# Patient Record
Sex: MALE | Race: WHITE | NOT HISPANIC OR LATINO | Employment: FULL TIME | URBAN - METROPOLITAN AREA
[De-identification: names, ages, dates, MRNs, and addresses within clinical notes are randomized per-mention and may not be internally consistent; named-entity substitution may affect disease eponyms.]

---

## 2017-07-01 ENCOUNTER — APPOINTMENT (EMERGENCY)
Dept: RADIOLOGY | Facility: HOSPITAL | Age: 47
End: 2017-07-01
Payer: COMMERCIAL

## 2017-07-01 ENCOUNTER — HOSPITAL ENCOUNTER (EMERGENCY)
Facility: HOSPITAL | Age: 47
Discharge: HOME | End: 2017-07-01
Admitting: EMERGENCY MEDICINE
Payer: COMMERCIAL

## 2017-07-01 VITALS
SYSTOLIC BLOOD PRESSURE: 159 MMHG | BODY MASS INDEX: 27.77 KG/M2 | DIASTOLIC BLOOD PRESSURE: 79 MMHG | HEART RATE: 78 BPM | WEIGHT: 205 LBS | HEIGHT: 72 IN | TEMPERATURE: 98.2 F | OXYGEN SATURATION: 99 % | RESPIRATION RATE: 20 BRPM

## 2017-07-01 DIAGNOSIS — M77.12 LATERAL EPICONDYLITIS OF LEFT ELBOW: Primary | ICD-10-CM

## 2017-07-01 PROCEDURE — 99283 EMERGENCY DEPT VISIT LOW MDM: CPT

## 2017-07-01 PROCEDURE — 73080 X-RAY EXAM OF ELBOW: CPT

## 2017-07-01 RX ORDER — TRAMADOL HYDROCHLORIDE 50 MG/1
50 TABLET ORAL ONCE
Status: COMPLETED | OUTPATIENT
Start: 2017-07-01 | End: 2017-07-01

## 2017-07-01 RX ORDER — PREDNISONE 20 MG/1
60 TABLET ORAL ONCE
Status: COMPLETED | OUTPATIENT
Start: 2017-07-01 | End: 2017-07-01

## 2017-07-01 RX ORDER — PREDNISONE 20 MG/1
40 TABLET ORAL DAILY
Qty: 8 TABLET | Refills: 0 | Status: SHIPPED | OUTPATIENT
Start: 2017-07-01 | End: 2017-07-05

## 2017-07-01 RX ORDER — NAPROXEN 500 MG/1
500 TABLET ORAL 2 TIMES DAILY WITH MEALS
Qty: 20 TABLET | Refills: 0 | Status: SHIPPED | OUTPATIENT
Start: 2017-07-01 | End: 2018-02-25

## 2017-07-01 RX ADMIN — PREDNISONE 60 MG: 20 TABLET ORAL at 11:04

## 2017-07-01 RX ADMIN — TRAMADOL HYDROCHLORIDE 50 MG: 50 TABLET, COATED ORAL at 11:04

## 2017-07-22 ENCOUNTER — HOSPITAL ENCOUNTER (EMERGENCY)
Facility: HOSPITAL | Age: 47
Discharge: HOME | End: 2017-07-22
Attending: EMERGENCY MEDICINE | Admitting: EMERGENCY MEDICINE
Payer: COMMERCIAL

## 2017-07-22 VITALS
DIASTOLIC BLOOD PRESSURE: 100 MMHG | RESPIRATION RATE: 20 BRPM | HEART RATE: 78 BPM | TEMPERATURE: 97.1 F | OXYGEN SATURATION: 98 % | SYSTOLIC BLOOD PRESSURE: 178 MMHG | BODY MASS INDEX: 27.8 KG/M2 | WEIGHT: 205 LBS

## 2017-07-22 DIAGNOSIS — M77.10: Primary | ICD-10-CM

## 2017-07-22 PROCEDURE — 99283 EMERGENCY DEPT VISIT LOW MDM: CPT

## 2017-07-22 PROCEDURE — 96372 THER/PROPH/DIAG INJ SC/IM: CPT

## 2017-07-22 RX ORDER — KETOROLAC TROMETHAMINE 30 MG/ML
30 INJECTION, SOLUTION INTRAMUSCULAR; INTRAVENOUS ONCE
Status: COMPLETED | OUTPATIENT
Start: 2017-07-22 | End: 2017-07-22

## 2017-07-22 RX ORDER — PREDNISONE 20 MG/1
20 TABLET ORAL DAILY
Qty: 5 TABLET | Refills: 0 | Status: SHIPPED | OUTPATIENT
Start: 2017-07-22 | End: 2017-07-27

## 2017-07-22 RX ORDER — PREDNISONE 20 MG/1
20 TABLET ORAL ONCE
Status: COMPLETED | OUTPATIENT
Start: 2017-07-22 | End: 2017-07-22

## 2017-07-22 RX ADMIN — PREDNISONE 20 MG: 20 TABLET ORAL at 00:53

## 2017-07-22 RX ADMIN — KETOROLAC TROMETHAMINE 30 MG: 30 INJECTION, SOLUTION INTRAMUSCULAR at 00:53

## 2017-08-02 ENCOUNTER — ALLSCRIPTS OFFICE VISIT (OUTPATIENT)
Dept: OTHER | Facility: OTHER | Age: 47
End: 2017-08-02

## 2017-08-02 DIAGNOSIS — M25.532 PAIN IN LEFT WRIST: ICD-10-CM

## 2017-08-02 DIAGNOSIS — M77.12 LATERAL EPICONDYLITIS OF LEFT ELBOW: ICD-10-CM

## 2017-08-02 DIAGNOSIS — M19.029: ICD-10-CM

## 2017-08-09 ENCOUNTER — APPOINTMENT (OUTPATIENT)
Dept: OCCUPATIONAL THERAPY | Facility: CLINIC | Age: 47
End: 2017-08-09
Payer: COMMERCIAL

## 2017-08-09 PROCEDURE — 97033 APP MDLTY 1+IONTPHRSIS EA 15: CPT

## 2017-08-09 PROCEDURE — 97165 OT EVAL LOW COMPLEX 30 MIN: CPT

## 2017-08-10 ENCOUNTER — GENERIC CONVERSION - ENCOUNTER (OUTPATIENT)
Dept: OTHER | Facility: OTHER | Age: 47
End: 2017-08-10

## 2017-08-10 ENCOUNTER — APPOINTMENT (OUTPATIENT)
Dept: RADIOLOGY | Facility: CLINIC | Age: 47
End: 2017-08-10
Payer: COMMERCIAL

## 2017-08-10 DIAGNOSIS — M25.532 PAIN IN LEFT WRIST: ICD-10-CM

## 2017-08-10 PROCEDURE — 73110 X-RAY EXAM OF WRIST: CPT

## 2017-08-11 ENCOUNTER — APPOINTMENT (OUTPATIENT)
Dept: OCCUPATIONAL THERAPY | Facility: CLINIC | Age: 47
End: 2017-08-11
Payer: COMMERCIAL

## 2017-08-11 PROCEDURE — 97035 APP MDLTY 1+ULTRASOUND EA 15: CPT

## 2017-08-11 PROCEDURE — 97140 MANUAL THERAPY 1/> REGIONS: CPT

## 2017-08-14 ENCOUNTER — APPOINTMENT (OUTPATIENT)
Dept: OCCUPATIONAL THERAPY | Facility: CLINIC | Age: 47
End: 2017-08-14
Payer: COMMERCIAL

## 2017-08-14 PROCEDURE — 97110 THERAPEUTIC EXERCISES: CPT

## 2017-08-14 PROCEDURE — 97140 MANUAL THERAPY 1/> REGIONS: CPT

## 2017-08-17 ENCOUNTER — APPOINTMENT (OUTPATIENT)
Dept: OCCUPATIONAL THERAPY | Facility: CLINIC | Age: 47
End: 2017-08-17
Payer: COMMERCIAL

## 2017-08-17 PROCEDURE — 97140 MANUAL THERAPY 1/> REGIONS: CPT

## 2017-08-22 ENCOUNTER — APPOINTMENT (OUTPATIENT)
Dept: OCCUPATIONAL THERAPY | Facility: CLINIC | Age: 47
End: 2017-08-22
Payer: COMMERCIAL

## 2017-08-22 PROCEDURE — 97140 MANUAL THERAPY 1/> REGIONS: CPT

## 2017-08-24 ENCOUNTER — APPOINTMENT (OUTPATIENT)
Dept: OCCUPATIONAL THERAPY | Facility: CLINIC | Age: 47
End: 2017-08-24
Payer: COMMERCIAL

## 2017-08-24 PROCEDURE — 97140 MANUAL THERAPY 1/> REGIONS: CPT

## 2017-08-29 ENCOUNTER — APPOINTMENT (OUTPATIENT)
Dept: OCCUPATIONAL THERAPY | Facility: CLINIC | Age: 47
End: 2017-08-29
Payer: COMMERCIAL

## 2017-08-29 PROCEDURE — 97140 MANUAL THERAPY 1/> REGIONS: CPT

## 2017-08-29 PROCEDURE — 97110 THERAPEUTIC EXERCISES: CPT

## 2017-08-31 ENCOUNTER — APPOINTMENT (OUTPATIENT)
Dept: OCCUPATIONAL THERAPY | Facility: CLINIC | Age: 47
End: 2017-08-31
Payer: COMMERCIAL

## 2017-08-31 PROCEDURE — 97140 MANUAL THERAPY 1/> REGIONS: CPT

## 2017-09-01 ENCOUNTER — ALLSCRIPTS OFFICE VISIT (OUTPATIENT)
Dept: OTHER | Facility: OTHER | Age: 47
End: 2017-09-01

## 2017-09-05 ENCOUNTER — HOSPITAL ENCOUNTER (OUTPATIENT)
Dept: RADIOLOGY | Facility: HOSPITAL | Age: 47
Discharge: HOME | End: 2017-09-05
Attending: ORTHOPAEDIC SURGERY
Payer: COMMERCIAL

## 2017-09-05 DIAGNOSIS — M19.029: ICD-10-CM

## 2017-09-05 PROCEDURE — 73221 MRI JOINT UPR EXTREM W/O DYE: CPT

## 2017-09-07 ENCOUNTER — APPOINTMENT (OUTPATIENT)
Dept: OCCUPATIONAL THERAPY | Facility: CLINIC | Age: 47
End: 2017-09-07
Payer: COMMERCIAL

## 2017-09-07 PROCEDURE — 97110 THERAPEUTIC EXERCISES: CPT

## 2017-09-07 PROCEDURE — 97140 MANUAL THERAPY 1/> REGIONS: CPT

## 2017-09-08 ENCOUNTER — ALLSCRIPTS OFFICE VISIT (OUTPATIENT)
Dept: OTHER | Facility: OTHER | Age: 47
End: 2017-09-08

## 2017-09-11 ENCOUNTER — APPOINTMENT (OUTPATIENT)
Dept: OCCUPATIONAL THERAPY | Facility: CLINIC | Age: 47
End: 2017-09-11
Payer: COMMERCIAL

## 2017-09-11 ENCOUNTER — GENERIC CONVERSION - ENCOUNTER (OUTPATIENT)
Dept: OTHER | Facility: OTHER | Age: 47
End: 2017-09-11

## 2017-09-11 PROCEDURE — 97110 THERAPEUTIC EXERCISES: CPT

## 2017-09-11 PROCEDURE — 97140 MANUAL THERAPY 1/> REGIONS: CPT

## 2017-09-13 ENCOUNTER — APPOINTMENT (OUTPATIENT)
Dept: OCCUPATIONAL THERAPY | Facility: CLINIC | Age: 47
End: 2017-09-13
Payer: COMMERCIAL

## 2017-09-13 PROCEDURE — 97110 THERAPEUTIC EXERCISES: CPT

## 2017-09-13 PROCEDURE — 97140 MANUAL THERAPY 1/> REGIONS: CPT

## 2017-09-18 ENCOUNTER — APPOINTMENT (OUTPATIENT)
Dept: OCCUPATIONAL THERAPY | Facility: CLINIC | Age: 47
End: 2017-09-18
Payer: COMMERCIAL

## 2017-09-19 ENCOUNTER — APPOINTMENT (OUTPATIENT)
Dept: OCCUPATIONAL THERAPY | Facility: CLINIC | Age: 47
End: 2017-09-19
Payer: COMMERCIAL

## 2017-09-19 PROCEDURE — 97110 THERAPEUTIC EXERCISES: CPT

## 2017-09-19 PROCEDURE — 97140 MANUAL THERAPY 1/> REGIONS: CPT

## 2017-09-20 ENCOUNTER — APPOINTMENT (OUTPATIENT)
Dept: OCCUPATIONAL THERAPY | Facility: CLINIC | Age: 47
End: 2017-09-20
Payer: COMMERCIAL

## 2017-09-20 PROCEDURE — 97140 MANUAL THERAPY 1/> REGIONS: CPT

## 2017-09-20 PROCEDURE — 97110 THERAPEUTIC EXERCISES: CPT

## 2017-09-22 ENCOUNTER — GENERIC CONVERSION - ENCOUNTER (OUTPATIENT)
Dept: OTHER | Facility: OTHER | Age: 47
End: 2017-09-22

## 2017-09-25 ENCOUNTER — APPOINTMENT (OUTPATIENT)
Dept: OCCUPATIONAL THERAPY | Facility: CLINIC | Age: 47
End: 2017-09-25
Payer: COMMERCIAL

## 2017-09-25 PROCEDURE — 97110 THERAPEUTIC EXERCISES: CPT

## 2017-09-25 PROCEDURE — 97140 MANUAL THERAPY 1/> REGIONS: CPT

## 2017-09-27 ENCOUNTER — APPOINTMENT (OUTPATIENT)
Dept: OCCUPATIONAL THERAPY | Facility: CLINIC | Age: 47
End: 2017-09-27
Payer: COMMERCIAL

## 2017-10-02 ENCOUNTER — APPOINTMENT (OUTPATIENT)
Dept: OCCUPATIONAL THERAPY | Facility: CLINIC | Age: 47
End: 2017-10-02
Payer: COMMERCIAL

## 2017-10-02 PROCEDURE — 97140 MANUAL THERAPY 1/> REGIONS: CPT

## 2017-10-02 PROCEDURE — 97110 THERAPEUTIC EXERCISES: CPT

## 2017-10-03 ENCOUNTER — ALLSCRIPTS OFFICE VISIT (OUTPATIENT)
Dept: OTHER | Facility: OTHER | Age: 47
End: 2017-10-03

## 2017-10-04 ENCOUNTER — APPOINTMENT (OUTPATIENT)
Dept: OCCUPATIONAL THERAPY | Facility: CLINIC | Age: 47
End: 2017-10-04
Payer: COMMERCIAL

## 2017-10-04 PROCEDURE — G8986 CARRY D/C STATUS: HCPCS

## 2017-10-04 PROCEDURE — 97110 THERAPEUTIC EXERCISES: CPT

## 2017-10-04 PROCEDURE — G8985 CARRY GOAL STATUS: HCPCS

## 2017-10-04 PROCEDURE — G8984 CARRY CURRENT STATUS: HCPCS

## 2017-10-16 ENCOUNTER — TRANSCRIBE ORDERS (OUTPATIENT)
Dept: ADMINISTRATIVE | Facility: HOSPITAL | Age: 47
End: 2017-10-16

## 2017-10-16 DIAGNOSIS — M06.4 INFLAMMATORY POLYARTHROPATHY (HCC): Primary | ICD-10-CM

## 2017-10-16 DIAGNOSIS — M25.542 ARTHRALGIA OF LEFT HAND: ICD-10-CM

## 2017-10-25 ENCOUNTER — HOSPITAL ENCOUNTER (OUTPATIENT)
Dept: RADIOLOGY | Facility: HOSPITAL | Age: 47
Discharge: HOME | End: 2017-10-25
Payer: COMMERCIAL

## 2017-10-25 DIAGNOSIS — M06.4 INFLAMMATORY POLYARTHROPATHY (HCC): ICD-10-CM

## 2017-10-25 DIAGNOSIS — M25.542 ARTHRALGIA OF LEFT HAND: ICD-10-CM

## 2017-10-25 PROCEDURE — 73223 MRI JOINT UPR EXTR W/O&W/DYE: CPT

## 2017-10-25 PROCEDURE — A9585 GADOBUTROL INJECTION: HCPCS | Performed by: RADIOLOGY

## 2017-10-25 RX ADMIN — GADOBUTROL 9 ML: 604.72 INJECTION INTRAVENOUS at 10:36

## 2018-01-14 VITALS
HEIGHT: 72 IN | WEIGHT: 216.25 LBS | BODY MASS INDEX: 29.29 KG/M2 | DIASTOLIC BLOOD PRESSURE: 81 MMHG | SYSTOLIC BLOOD PRESSURE: 161 MMHG | HEART RATE: 90 BPM

## 2018-01-14 VITALS
BODY MASS INDEX: 28.63 KG/M2 | WEIGHT: 211.38 LBS | SYSTOLIC BLOOD PRESSURE: 158 MMHG | HEIGHT: 72 IN | HEART RATE: 76 BPM | DIASTOLIC BLOOD PRESSURE: 79 MMHG

## 2018-01-14 NOTE — RESULT NOTES
Verified Results  * XR WRIST 3+ VIEW LEFT 10Aug2017 08:19AM Arnaldo Julien Order Number: [id number]     Test Name Result Flag Reference   XR WRIST 3+ VW LEFT (Report)     LEFT WRIST     INDICATION: Left wrist pain  COMPARISON: None     VIEWS: PA, lateral, and oblique     IMAGES: 3     FINDINGS:     There is no acute fracture or dislocation  No degenerative changes  No lytic or blastic lesions are seen  Soft tissues are unremarkable  IMPRESSION:     No acute osseous abnormality         Workstation performed: [id number]     Signed by:   Shahnaz Arellano MD   8/10/17

## 2018-01-17 NOTE — MISCELLANEOUS
Message  Return to work or school:   Diana Renee is under my professional care  He was seen in my office on Friday, September 8, 2017  Patient may return to my office if needed  If you have any questions, feel free to call my office  He is not able to return to work until Sunday, October 1, 2017                        Claire Lwa MD       Signatures   Electronically signed by : LAURA Dsouza ; Sep 12 2017 12:29PM EST

## 2018-01-18 NOTE — MISCELLANEOUS
Message  Return to work or school:   Zane Burton is under my professional care  He was seen in my office on 10/03/2017    He is not able to return to work until 10/18/2017     SINCERELY,                   [address] [facility]  LAURA Bedoya        Signatures   Electronically signed by : LAURA Batista ; Oct  3 2017 12:46PM EST

## 2018-01-22 VITALS
WEIGHT: 210 LBS | DIASTOLIC BLOOD PRESSURE: 85 MMHG | SYSTOLIC BLOOD PRESSURE: 136 MMHG | HEART RATE: 90 BPM | BODY MASS INDEX: 28.44 KG/M2 | HEIGHT: 72 IN

## 2018-01-22 VITALS
DIASTOLIC BLOOD PRESSURE: 75 MMHG | HEART RATE: 88 BPM | WEIGHT: 210.5 LBS | BODY MASS INDEX: 28.51 KG/M2 | HEIGHT: 72 IN | SYSTOLIC BLOOD PRESSURE: 137 MMHG

## 2018-01-22 VITALS
BODY MASS INDEX: 28.44 KG/M2 | HEART RATE: 92 BPM | SYSTOLIC BLOOD PRESSURE: 160 MMHG | HEIGHT: 72 IN | DIASTOLIC BLOOD PRESSURE: 83 MMHG | WEIGHT: 210 LBS

## 2018-01-22 VITALS
HEIGHT: 72 IN | WEIGHT: 210 LBS | BODY MASS INDEX: 28.44 KG/M2 | DIASTOLIC BLOOD PRESSURE: 90 MMHG | HEART RATE: 92 BPM | SYSTOLIC BLOOD PRESSURE: 140 MMHG

## 2018-02-25 ENCOUNTER — HOSPITAL ENCOUNTER (EMERGENCY)
Facility: HOSPITAL | Age: 48
Discharge: HOME | End: 2018-02-25
Attending: EMERGENCY MEDICINE | Admitting: EMERGENCY MEDICINE
Payer: COMMERCIAL

## 2018-02-25 VITALS
WEIGHT: 205 LBS | BODY MASS INDEX: 27.8 KG/M2 | SYSTOLIC BLOOD PRESSURE: 153 MMHG | RESPIRATION RATE: 20 BRPM | HEART RATE: 90 BPM | OXYGEN SATURATION: 97 % | DIASTOLIC BLOOD PRESSURE: 79 MMHG | TEMPERATURE: 98.9 F

## 2018-02-25 DIAGNOSIS — S61.219A FINGER LACERATION: Primary | ICD-10-CM

## 2018-02-25 PROCEDURE — 90471 IMMUNIZATION ADMIN: CPT

## 2018-02-25 PROCEDURE — 99282 EMERGENCY DEPT VISIT SF MDM: CPT

## 2018-02-25 PROCEDURE — 90715 TDAP VACCINE 7 YRS/> IM: CPT | Performed by: EMERGENCY MEDICINE

## 2018-02-25 RX ORDER — GINSENG 100 MG
1 CAPSULE ORAL ONCE
Status: COMPLETED | OUTPATIENT
Start: 2018-02-25 | End: 2018-02-25

## 2018-02-25 RX ADMIN — TETANUS TOXOID, REDUCED DIPHTHERIA TOXOID AND ACELLULAR PERTUSSIS VACCINE, ADSORBED 0.5 ML: 5; 2.5; 8; 8; 2.5 SUSPENSION INTRAMUSCULAR at 15:50

## 2018-02-25 RX ADMIN — BACITRACIN ZINC 1 SMALL APPLICATION: 500 OINTMENT TOPICAL at 15:50

## 2018-02-25 NOTE — DISCHARGE INSTRUCTIONS
Care For Your Stitches   WHAT YOU NEED TO KNOW:   Stitches, or sutures, are used to close cuts and wounds on the skin  Stitches need to be removed after your wound has healed  DISCHARGE INSTRUCTIONS:   Seek care immediately if:   · Your stitches come apart  · Blood soaks through your bandages  · You suddenly cannot move your injured joint  · You have sudden numbness around your wound  · You see red streaks coming from your wound  Contact your healthcare provider if:   · You have a fever and chills  · Your wound is red, warm, swollen, or leaking pus  · There is a bad smell coming from your wound  · You have increased pain in the wound area  · You have questions or concerns about your condition or care  Care for your stitches:  Keep your stitches clean and dry  You may need to cover your stitches with a bandage for 24 to 48 hours, or as directed  Do not bump or hit the suture area, as this could open the wound  Do not trim or shorten the ends of your stitches  If they rub on your clothing, put a gauze bandage between the stitches and your clothes  Clean your wound:  Carefully wash your wound with soap and water  For mouth and lip wounds, rinse your mouth after meals and at bedtime  Ask your healthcare provider what to use to rinse your mouth  If you have a scalp wound, you may gently wash your hair every 2 days with mild shampoo  Do not use hair products, such as hair spray  Help your wound heal:   · Elevate  your wound above the level of your heart as often as you can  This will help decrease swelling and pain  Prop your wound on pillows or blankets to keep it elevated comfortably  · Limit activity  Do not stretch the skin around your wound  This will help prevent bleeding and swelling of the wound area  Follow up with your healthcare provider as directed: You may need to return to have your stitches removed   Write down your questions so you remember to ask them during your visits  © 2017 [facility] Information is for End User's use only and may not be sold, redistributed or otherwise used for commercial purposes  All illustrations and images included in CareNotes® are the copyrighted property of A D A M , Inc  or Chris Escobar  The above information is an  only  It is not intended as medical advice for individual conditions or treatments  Talk to your doctor, nurse or pharmacist before following any medical regimen to see if it is safe and effective for you

## 2018-02-25 NOTE — ED PROCEDURE NOTE
PROCEDURE  Lac Repair  Date/Time: 2/25/2018 4:36 PM  Performed by: Marisela Wesley  Authorized by: Marisela Wesley   Consent: Verbal consent obtained  Consent given by: patient  Patient identity confirmed: verbally with patient and arm band  Anesthesia: digital block    Anesthesia:  Local Anesthetic: lidocaine 2% without epinephrine      Procedure Details:  Preparation: Patient was prepped and draped in the usual sterile fashion    Irrigation solution: saline  Irrigation method: syringe  Amount of cleaning: standard  Skin closure: 5-0 nylon  Number of sutures: 4  Technique: simple  Approximation: close  Approximation difficulty: simple  Dressing: antibiotic ointment  Patient tolerance: Patient tolerated the procedure well with no immediate complications           Mounika Chavez DO  02/25/18 3796

## 2018-02-26 NOTE — ED PROVIDER NOTES
History  Chief Complaint   Patient presents with    Finger Laceration     cut L index finger on a knife while cutting a pepper     Patient presents after scutting his left finger with a knife while cutting a pepper in the kitchen  Unsure of last tetanus shot does not think it is up to date  History provided by:  Patient   used: No    Finger Laceration       None       Past Medical History:   Diagnosis Date    Gout     Tendonitis        History reviewed  No pertinent surgical history  History reviewed  No pertinent family history  I have reviewed and agree with the history as documented  Social History   Substance Use Topics    Smoking status: Never Smoker    Smokeless tobacco: Never Used    Alcohol use No        Review of Systems   Skin: Positive for wound  All other systems reviewed and are negative  Physical Exam  ED Triage Vitals [02/25/18 1445]   Temperature Pulse Respirations Blood Pressure SpO2   98 9 °F (37 2 °C) 90 20 153/79 97 %      Temp Source Heart Rate Source Patient Position - Orthostatic VS BP Location FiO2 (%)   Tympanic Monitor Sitting Right arm --      Pain Score       5           Orthostatic Vital Signs  Vitals:    02/25/18 1445   BP: 153/79   Pulse: 90   Patient Position - Orthostatic VS: Sitting       Physical Exam   Constitutional: He is oriented to person, place, and time  No distress  Cardiovascular: Normal rate, regular rhythm and intact distal pulses  Pulmonary/Chest: Effort normal and breath sounds normal  No respiratory distress  Musculoskeletal: Normal range of motion  Neurological: He is alert and oriented to person, place, and time  Skin: Capillary refill takes less than 2 seconds  He is not diaphoretic  Nursing note and vitals reviewed        ED Medications  Medications   tetanus-diphtheria-acellular pertussis (BOOSTRIX) IM injection 0 5 mL (0 5 mL Intramuscular Given 2/25/18 1550)   bacitracin topical ointment 1 small application (1 small application Topical Given 2/25/18 3240)       Diagnostic Studies  Results Reviewed     None                 No orders to display              Procedures  Procedures       Phone Contacts  ED Phone Contact    ED Course  ED Course                                MDM  Number of Diagnoses or Management Options  Finger laceration:   Diagnosis management comments: Pulse ox 97% on RA indicating adequate oxygenation     Patient Progress  Patient progress: stable    CritCare Time    Disposition  Final diagnoses:   Finger laceration     Time reflects when diagnosis was documented in both MDM as applicable and the Disposition within this note     Time User Action Codes Description Comment    2/25/2018  3:42 PM Tracy Vivar [F73 [id number]] Finger laceration       ED Disposition     ED Disposition Condition Comment    Discharge  Jahaira Falcon discharge to home/self care  Condition at discharge: stable        Follow-up Information     Follow up With Specialties Details Why Contact Info    Cong Gaona MD   7-10 days , For suture removal [address] [id number]  [phone]          There are no discharge medications for this patient  No discharge procedures on file      ED Provider  Electronically Signed by           Lexx Smith DO  02/26/18 2660

## 2020-02-26 ENCOUNTER — OFFICE VISIT (OUTPATIENT)
Dept: PODIATRY | Facility: CLINIC | Age: 50
End: 2020-02-26
Payer: COMMERCIAL

## 2020-02-26 VITALS
HEART RATE: 76 BPM | BODY MASS INDEX: 27.77 KG/M2 | SYSTOLIC BLOOD PRESSURE: 134 MMHG | WEIGHT: 205 LBS | RESPIRATION RATE: 16 BRPM | HEIGHT: 72 IN | DIASTOLIC BLOOD PRESSURE: 88 MMHG

## 2020-02-26 DIAGNOSIS — M21.42 ACQUIRED FLAT FOOT, LEFT: ICD-10-CM

## 2020-02-26 DIAGNOSIS — M76.61 ACHILLES TENDINITIS OF RIGHT LOWER EXTREMITY: Primary | ICD-10-CM

## 2020-02-26 DIAGNOSIS — M21.41 ACQUIRED FLAT FOOT, RIGHT: ICD-10-CM

## 2020-02-26 DIAGNOSIS — M21.969 ACQUIRED DEFORMITY OF FOOT, UNSPECIFIED LATERALITY: ICD-10-CM

## 2020-02-26 PROCEDURE — L3000 FT INSERT UCB BERKELEY SHELL: HCPCS | Performed by: PODIATRIST

## 2020-02-26 PROCEDURE — 20551 NJX 1 TENDON ORIGIN/INSJ: CPT | Performed by: PODIATRIST

## 2020-02-26 PROCEDURE — 99203 OFFICE O/P NEW LOW 30 MIN: CPT | Performed by: PODIATRIST

## 2020-02-26 PROCEDURE — 73620 X-RAY EXAM OF FOOT: CPT | Performed by: PODIATRIST

## 2020-02-26 RX ORDER — COLESEVELAM 180 1/1
TABLET ORAL
COMMUNITY
Start: 2019-11-19

## 2020-02-26 RX ORDER — OMEGA-3S/DHA/EPA/FISH OIL/D3 300MG-1000
400 CAPSULE ORAL DAILY
COMMUNITY

## 2020-03-02 ENCOUNTER — TRANSCRIBE ORDERS (OUTPATIENT)
Dept: ADMINISTRATIVE | Facility: HOSPITAL | Age: 50
End: 2020-03-02

## 2020-03-02 DIAGNOSIS — M76.61 ACHILLES TENDINITIS OF RIGHT LOWER EXTREMITY: Primary | ICD-10-CM

## 2020-03-11 ENCOUNTER — HOSPITAL ENCOUNTER (OUTPATIENT)
Dept: RADIOLOGY | Facility: HOSPITAL | Age: 50
Discharge: HOME | End: 2020-03-11
Payer: COMMERCIAL

## 2020-03-11 DIAGNOSIS — M76.61 ACHILLES TENDINITIS OF RIGHT LOWER EXTREMITY: ICD-10-CM

## 2020-03-11 PROCEDURE — 76882 US LMTD JT/FCL EVL NVASC XTR: CPT

## 2020-03-12 ENCOUNTER — TELEPHONE (OUTPATIENT)
Dept: PODIATRY | Facility: CLINIC | Age: 50
End: 2020-03-12

## 2020-04-13 ENCOUNTER — OFFICE VISIT (OUTPATIENT)
Dept: PODIATRY | Facility: CLINIC | Age: 50
End: 2020-04-13
Payer: COMMERCIAL

## 2020-04-13 DIAGNOSIS — M21.961 ACQUIRED DEFORMITY OF RIGHT FOOT: Primary | ICD-10-CM

## 2020-04-13 DIAGNOSIS — M79.671 RIGHT FOOT PAIN: ICD-10-CM

## 2020-04-13 DIAGNOSIS — M76.61 ACHILLES TENDINITIS OF RIGHT LOWER EXTREMITY: ICD-10-CM

## 2020-04-13 DIAGNOSIS — M10.9 ACUTE GOUTY ARTHRITIS: ICD-10-CM

## 2020-04-13 PROCEDURE — 73620 X-RAY EXAM OF FOOT: CPT | Performed by: PODIATRIST

## 2020-04-13 PROCEDURE — L1902 AFO ANKLE GAUNTLET PRE OTS: HCPCS | Performed by: PODIATRIST

## 2020-04-13 PROCEDURE — 99213 OFFICE O/P EST LOW 20 MIN: CPT | Performed by: PODIATRIST

## 2020-04-13 RX ORDER — MELOXICAM 7.5 MG/1
7.5 TABLET ORAL DAILY
Qty: 20 TABLET | Refills: 0 | Status: SHIPPED | OUTPATIENT
Start: 2020-04-13 | End: 2020-05-03

## 2020-04-15 DIAGNOSIS — M79.671 RIGHT FOOT PAIN: ICD-10-CM

## 2020-04-15 RX ORDER — ETODOLAC 400 MG/1
400 TABLET, FILM COATED ORAL 2 TIMES DAILY
Qty: 20 TABLET | Refills: 0 | Status: SHIPPED | OUTPATIENT
Start: 2020-04-15 | End: 2020-04-25

## 2020-04-15 RX ORDER — ETODOLAC 400 MG/1
400 TABLET, FILM COATED ORAL 2 TIMES DAILY
Qty: 20 TABLET | Refills: 0 | Status: SHIPPED | OUTPATIENT
Start: 2020-04-15 | End: 2020-04-15

## 2020-04-17 ENCOUNTER — OFFICE VISIT (OUTPATIENT)
Dept: PODIATRY | Facility: CLINIC | Age: 50
End: 2020-04-17
Payer: COMMERCIAL

## 2020-04-17 VITALS
SYSTOLIC BLOOD PRESSURE: 134 MMHG | BODY MASS INDEX: 27.77 KG/M2 | DIASTOLIC BLOOD PRESSURE: 88 MMHG | HEART RATE: 76 BPM | WEIGHT: 205 LBS | HEIGHT: 72 IN | RESPIRATION RATE: 17 BRPM

## 2020-04-17 DIAGNOSIS — M10.9 ACUTE GOUTY ARTHRITIS: Primary | ICD-10-CM

## 2020-04-17 DIAGNOSIS — M79.671 RIGHT FOOT PAIN: ICD-10-CM

## 2020-04-17 PROCEDURE — 99212 OFFICE O/P EST SF 10 MIN: CPT | Performed by: PODIATRIST

## 2020-04-17 PROCEDURE — 20605 DRAIN/INJ JOINT/BURSA W/O US: CPT | Performed by: PODIATRIST

## 2020-04-18 LAB
ALBUMIN SERPL-MCNC: 4.3 G/DL (ref 3.6–5.1)
ALBUMIN/GLOB SERPL: 1.9 (CALC) (ref 1–2.5)
ALP SERPL-CCNC: 81 U/L (ref 35–144)
ALT SERPL-CCNC: 25 U/L (ref 9–46)
AST SERPL-CCNC: 15 U/L (ref 10–35)
BILIRUB SERPL-MCNC: 0.5 MG/DL (ref 0.2–1.2)
BUN SERPL-MCNC: 16 MG/DL (ref 7–25)
BUN/CREAT SERPL: ABNORMAL (CALC) (ref 6–22)
CALCIUM SERPL-MCNC: 9.5 MG/DL (ref 8.6–10.3)
CHLORIDE SERPL-SCNC: 102 MMOL/L (ref 98–110)
CO2 SERPL-SCNC: 27 MMOL/L (ref 20–32)
CREAT SERPL-MCNC: 0.98 MG/DL (ref 0.7–1.33)
GLOBULIN SER CALC-MCNC: 2.3 G/DL (CALC) (ref 1.9–3.7)
GLUCOSE SERPL-MCNC: 135 MG/DL (ref 65–99)
POTASSIUM SERPL-SCNC: 4.4 MMOL/L (ref 3.5–5.3)
PROT SERPL-MCNC: 6.6 G/DL (ref 6.1–8.1)
SL AMB EGFR AFRICAN AMERICAN: 103 ML/MIN/1.73M2
SL AMB EGFR NON AFRICAN AMERICAN: 89 ML/MIN/1.73M2
SODIUM SERPL-SCNC: 138 MMOL/L (ref 135–146)
URATE SERPL-MCNC: 5.9 MG/DL (ref 4–8)

## 2020-04-27 ENCOUNTER — TELEPHONE (OUTPATIENT)
Dept: PODIATRY | Facility: CLINIC | Age: 50
End: 2020-04-27

## 2022-01-01 ENCOUNTER — NURSE TRIAGE (OUTPATIENT)
Dept: OTHER | Facility: OTHER | Age: 52
End: 2022-01-01

## 2022-01-01 NOTE — TELEPHONE ENCOUNTER
Regarding: COVID-Symptomatic- flu like symptoms  ----- Message from Taylor Butterfield sent at 1/1/2022  8:27 AM EST -----  " I feel like I have strep throat, I have a flushed face, achy and flu like symptoms  "

## 2022-01-01 NOTE — TELEPHONE ENCOUNTER
1  Were you within 6 feet or less, for up to 15 minutes or more with a person that has a confirmed COVID-19 test? Yes   2  What was the date of your exposure? Unknown  3  Are you experiencing any symptoms attributed to the virus? Sore throat, cough, fever, bodyaches  4  HIGH RISK: Do you have any history heart or lung conditions, weakened immune system, diabetes, Asthma, CHF, HIV, COPD, Chemo, renal failure, sickle cell, etc? Type 2 Diabetes  5  VACCINE: "Have you gotten the COVID-19 vaccine?" If Yes ask: "Which one, how many shots, when did you get it?" Denies vaccination            Reason for Disposition   [1] COVID-19 diagnosed by positive lab test (e g , PCR, rapid self-test kit) AND [2] mild symptoms (e g , cough, fever, others) AND [2] no complications or SOB    Protocols used: CORONAVIRUS (COVID-19) [facility]ED [facility]USPECTED[facility]

## 2022-10-25 ENCOUNTER — HOSPITAL ENCOUNTER (EMERGENCY)
Facility: HOSPITAL | Age: 52
Discharge: HOME | End: 2022-10-26
Attending: GENERAL PRACTICE
Payer: COMMERCIAL

## 2022-10-25 ENCOUNTER — APPOINTMENT (EMERGENCY)
Dept: RADIOLOGY | Facility: HOSPITAL | Age: 52
End: 2022-10-25
Payer: COMMERCIAL

## 2022-10-25 VITALS
OXYGEN SATURATION: 98 % | HEART RATE: 92 BPM | WEIGHT: 199.8 LBS | RESPIRATION RATE: 18 BRPM | BODY MASS INDEX: 27.1 KG/M2 | TEMPERATURE: 98 F | SYSTOLIC BLOOD PRESSURE: 164 MMHG | DIASTOLIC BLOOD PRESSURE: 87 MMHG

## 2022-10-25 DIAGNOSIS — M10.9 GOUT: Primary | ICD-10-CM

## 2022-10-25 PROCEDURE — 73630 X-RAY EXAM OF FOOT: CPT

## 2022-10-25 PROCEDURE — 99284 EMERGENCY DEPT VISIT MOD MDM: CPT | Performed by: GENERAL PRACTICE

## 2022-10-25 RX ORDER — KETOROLAC TROMETHAMINE 30 MG/ML
30 INJECTION, SOLUTION INTRAMUSCULAR; INTRAVENOUS ONCE
Status: DISCONTINUED | OUTPATIENT
Start: 2022-10-25 | End: 2022-10-25

## 2022-10-25 RX ORDER — ACETAMINOPHEN 325 MG/1
975 TABLET ORAL ONCE
Status: COMPLETED | OUTPATIENT
Start: 2022-10-25 | End: 2022-10-25

## 2022-10-25 RX ORDER — MELOXICAM 15 MG/1
15 TABLET ORAL DAILY
Qty: 15 TABLET | Refills: 0 | Status: SHIPPED | OUTPATIENT
Start: 2022-10-25

## 2022-10-25 RX ORDER — KETOROLAC TROMETHAMINE 30 MG/ML
30 INJECTION, SOLUTION INTRAMUSCULAR; INTRAVENOUS ONCE
Status: COMPLETED | OUTPATIENT
Start: 2022-10-25 | End: 2022-10-25

## 2022-10-25 RX ADMIN — KETOROLAC TROMETHAMINE 30 MG: 30 INJECTION, SOLUTION INTRAMUSCULAR at 23:09

## 2022-10-25 RX ADMIN — ACETAMINOPHEN 975 MG: 325 TABLET, FILM COATED ORAL at 23:06

## 2022-10-26 NOTE — ED PROVIDER NOTES
History  Chief Complaint   Patient presents with   • Gout Pain     Recurrent flare up from 3wks ago with gout to R foot  Patient is a 48yo M with PMHx gout who presents to the ED with 3 weeks of worsening R great toe pain  States it feels like prior gout flairs, the last of which was in 2020  Patient has seen Dr Prakash Watts, podiatry, in the past, but was unable to schedule an appointment  His PMD prescribed him a medrol dosepak and meloxicam 3 weeks ago which he states isn't helping  He last took meloxicam 2 days ago  No known trauma  Prior to Admission Medications   Prescriptions Last Dose Informant Patient Reported? Taking? cholecalciferol (VITAMIN D3) 400 units tablet 10/25/2022 at Unknown time  Yes Yes   Sig: Take 400 Units by mouth daily   [facility]) 625 mg tablet Not Taking at Unknown time  Yes No   Patient not taking: Reported on 10/25/2022   diclofenac sodium (VOLTAREN) 1 %   No No   Sig: Apply 2 g topically 4 (four) times a day   etodolac (LODINE) 400 MG tablet   No No   Sig: Take 1 tablet (400 mg total) by mouth 2 (two) times a day for 10 days   meloxicam (MOBIC) 7 5 mg tablet   No No   Sig: Take 1 tablet (7 5 mg total) by mouth daily for 20 days   Patient not taking: Reported on 4/17/2020      Facility-Administered Medications: None       Past Medical History:   Diagnosis Date   • Diabetes mellitus ([facility] [address] )    • Gout    • Tendonitis        History reviewed  No pertinent surgical history  History reviewed  No pertinent family history  I have reviewed and agree with the history as documented      E-Cigarette/Vaping   • E-Cigarette Use Never User      E-Cigarette/Vaping Substances   • Nicotine No    • THC No    • CBD No    • Flavoring No    • Other No    • Unknown No      Social History     Tobacco Use   • Smoking status: Never Smoker   • Smokeless tobacco: Never Used   Vaping Use   • Vaping Use: Never used   Substance Use Topics   • Alcohol use: No   • Drug use: No       Review of Systems   Constitutional: Negative for chills and fatigue  HENT: Negative for congestion and rhinorrhea  Eyes: Negative for redness and visual disturbance  Respiratory: Negative for cough and wheezing  Cardiovascular: Negative for chest pain and palpitations  Gastrointestinal: Negative for abdominal pain, constipation, diarrhea, nausea and vomiting  Endocrine: Negative for polydipsia and polyuria  Genitourinary: Negative for dysuria and hematuria  Musculoskeletal: Negative for arthralgias and myalgias  R great toe pain   Neurological: Negative for light-headedness and headaches  Hematological: Negative for adenopathy  Does not bruise/bleed easily  Psychiatric/Behavioral: Negative for dysphoric mood  The patient is not nervous/anxious  All other systems reviewed and are negative  Physical Exam  Physical Exam  Constitutional:       General: He is not in acute distress  Appearance: Normal appearance  He is not ill-appearing  HENT:      Head: Normocephalic and atraumatic  Mouth/Throat:      Mouth: Mucous membranes are moist       Pharynx: Oropharynx is clear  Eyes:      General: No scleral icterus  Conjunctiva/sclera: Conjunctivae normal    Cardiovascular:      Rate and Rhythm: Normal rate and regular rhythm  Pulses: Normal pulses  Heart sounds: No murmur heard  No friction rub  No gallop  Pulmonary:      Effort: Pulmonary effort is normal  No respiratory distress  Breath sounds: Normal breath sounds  No wheezing, rhonchi or rales  Abdominal:      Palpations: Abdomen is soft  Tenderness: There is no abdominal tenderness  Musculoskeletal:         General: No swelling or tenderness  Normal range of motion  Cervical back: Normal range of motion and neck supple  Feet:    Feet:      Comments: Tenderness to palpation of R 1st MTP  Mild erytehma but no warmth or swelling of the joint  Skin:     General: Skin is warm and dry  Capillary Refill: Capillary refill takes less than 2 seconds  Neurological:      General: No focal deficit present  Mental Status: He is alert and oriented to person, place, and time  Mental status is at baseline  Psychiatric:         Mood and Affect: Mood normal          Behavior: Behavior normal          Vital Signs  ED Triage Vitals [10/25/22 2127]   Temperature Pulse Respirations Blood Pressure SpO2   99 1 °F (37 3 °C) 85 16 164/87 96 %      Temp Source Heart Rate Source Patient Position - Orthostatic VS BP Location FiO2 (%)   Tympanic Monitor Sitting Right arm --      Pain Score       10 - Worst Possible Pain           Vitals:    10/25/22 2127 10/25/22 2345   BP: 164/87    Pulse: 85 76   Patient Position - Orthostatic VS: Sitting          Visual Acuity      ED Medications  Medications   acetaminophen (TYLENOL) tablet 975 mg (975 mg Oral Given 10/25/22 2306)   ketorolac (TORADOL) injection 30 mg (30 mg Intramuscular Given 10/25/22 2309)       Diagnostic Studies  Results Reviewed     None                 XR foot 3+ views RIGHT   Final Result by Librado Rios DO (10/25 2348)            No acute osseous abnormality  If symptoms persist recommend repeat x-ray in 7-10 days to evaluate for occult fracture      Workstation performed: [id number]                    Procedures  Procedures         ED Course  ED Course as of 10/25/22 [address] Oct 25, 2022   2329 Requested read from radiology   [phone] Results discussed with patient  He is feeling better  He will take meloxicam daily and add tylenol PRN  He will follow-up with podiatry or orthopedics                                                MDM    Disposition  Final diagnoses:   Gout     Time reflects when diagnosis was documented in both MDM as applicable and the Disposition within this note     Time User Action Codes Description Comment    10/25/2022 10:26 PM Eva Del Rio Add [M10 9] Gout       ED Disposition     None      Follow-up Information     Follow up With Specialties Details Why Contact Info    Aren Ku DPM Podiatry Schedule an appointment as soon as possible for a visit   [address] [facility] [address], [address] [facility] Orthopedic Surgery   [address] [address]  [phone]      [address] [place] [address]    [address] [address]            Patient's Medications   Discharge Prescriptions    MELOXICAM (MOBIC) 15 MG TABLET    Take 1 tablet (15 mg total) by mouth daily       Start Date: 10/25/2022End Date: --       Order Dose: 15 mg       Quantity: 15 tablet    Refills: 0       No discharge procedures on file      PDMP Review     None          ED Provider  Electronically Signed by           Екатерина Smith MD  10/25/22 5906

## 2022-10-28 ENCOUNTER — OFFICE VISIT (OUTPATIENT)
Dept: PODIATRY | Facility: CLINIC | Age: 52
End: 2022-10-28
Payer: COMMERCIAL

## 2022-10-28 VITALS — BODY MASS INDEX: 27.86 KG/M2 | RESPIRATION RATE: 17 BRPM | WEIGHT: 199 LBS | HEIGHT: 71 IN

## 2022-10-28 DIAGNOSIS — M21.962 ACQUIRED DEFORMITY OF LEFT FOOT: ICD-10-CM

## 2022-10-28 DIAGNOSIS — M21.42 ACQUIRED FLAT FOOT, LEFT: ICD-10-CM

## 2022-10-28 DIAGNOSIS — M21.41 ACQUIRED FLAT FOOT, RIGHT: ICD-10-CM

## 2022-10-28 DIAGNOSIS — M25.571 ARTHRALGIA OF RIGHT FOOT: ICD-10-CM

## 2022-10-28 DIAGNOSIS — M21.961 ACQUIRED DEFORMITY OF RIGHT FOOT: Primary | ICD-10-CM

## 2022-10-28 DIAGNOSIS — M10.9 ACUTE GOUTY ARTHRITIS: ICD-10-CM

## 2022-10-28 PROCEDURE — 20605 DRAIN/INJ JOINT/BURSA W/O US: CPT | Performed by: PODIATRIST

## 2022-10-28 PROCEDURE — L3000 FT INSERT UCB BERKELEY SHELL: HCPCS | Performed by: PODIATRIST

## 2022-10-28 PROCEDURE — 99213 OFFICE O/P EST LOW 20 MIN: CPT | Performed by: PODIATRIST

## 2022-10-28 PROCEDURE — 73620 X-RAY EXAM OF FOOT: CPT | Performed by: PODIATRIST

## 2022-10-28 RX ORDER — ETODOLAC 400 MG/1
400 TABLET, FILM COATED ORAL 2 TIMES DAILY
Qty: 20 TABLET | Refills: 0 | Status: SHIPPED | OUTPATIENT
Start: 2022-10-28 | End: 2022-11-07

## 2022-10-28 RX ORDER — COLCHICINE 0.6 MG/1
0.6 TABLET ORAL DAILY
Qty: 4 TABLET | Refills: 0 | Status: SHIPPED | OUTPATIENT
Start: 2022-10-28 | End: 2022-11-01

## 2022-10-28 NOTE — PROGRESS NOTES
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   Acquired pes planus  Acute gouty arthritis right 1st MPJ  Hyperuricemia  Plan  Foot exam performed  X-rays taken reviewed  Today right 1st MPJ arthrocentesis done  1 cc Kenalog 10 injected into right 1st MPJ without pain or complication  Patient will be placed on both colchicine Lodine  He would benefit from pronation control  His feet have been casted for custom molded foot orthotics  He is urged to follow-up with rheumatology  Return p r n  Diagnoses and all orders for this visit:    Acquired deformity of right foot    Acquired deformity of left foot    Acquired flat foot, right    Acquired flat foot, left    Acute gouty arthritis  -     etodolac (LODINE) 400 MG tablet; Take 1 tablet (400 mg total) by mouth 2 (two) times a day for 10 days  -     colchicine (COLCRYS) 0 6 mg tablet; Take 1 tablet (0 6 mg total) by mouth daily for 4 days    Arthralgia of right foot  -     etodolac (LODINE) 400 MG tablet; Take 1 tablet (400 mg total) by mouth 2 (two) times a day for 10 days  -     colchicine (COLCRYS) 0 6 mg tablet; Take 1 tablet (0 6 mg total) by mouth daily for 4 days          Subjective:  Urgent visit  Patient has return of pain in his right big toe joint  He believes he has gout  He went to the emergency room  The only gave him prednisone      No Known Allergies      Current Outpatient Medications:   •  cholecalciferol (VITAMIN D3) 400 units tablet, Take 400 Units by mouth daily, Disp: , Rfl:   •  colchicine (COLCRYS) 0 6 mg tablet, Take 1 tablet (0 6 mg total) by mouth daily for 4 days, Disp: 4 tablet, Rfl: 0  •  colesevelam (WELCHOL) 625 mg tablet, , Disp: , Rfl:   •  diclofenac sodium (VOLTAREN) 1 %, Apply 2 g topically 4 (four) times a day, Disp: 240 g, Rfl: 0  •  etodolac (LODINE) 400 MG tablet, Take 1 tablet (400 mg total) by mouth 2 (two) times a day for 10 days, Disp: 20 tablet, Rfl: 0  • meloxicam (Mobic) 15 mg tablet, Take 1 tablet (15 mg total) by mouth daily, Disp: 15 tablet, Rfl: 0    There is no problem list on file for this patient  Patient ID: Edward Barney is a 45 y o  male  HPI    The following portions of the patient's history were reviewed and updated as appropriate:     family history is not on file  reports that he has never smoked  He has never used smokeless tobacco  He reports that he does not drink alcohol and does not use drugs  Vitals:    10/28/22 1126   Resp: 17       Review of Systems      Objective:  Patient's shoes and socks removed     Foot ExamPhysical Exam

## 2024-03-09 ENCOUNTER — HOSPITAL ENCOUNTER (OUTPATIENT)
Dept: CT IMAGING | Facility: HOSPITAL | Age: 54
Discharge: HOME | End: 2024-03-09
Payer: COMMERCIAL

## 2024-03-09 DIAGNOSIS — E11.9 TYPE 2 DIABETES MELLITUS WITHOUT COMPLICATIONS (HCC): ICD-10-CM

## 2024-03-09 DIAGNOSIS — E78.5 HYPERLIPIDEMIA, UNSPECIFIED: ICD-10-CM

## 2024-03-09 PROCEDURE — 75571 CT HRT W/O DYE W/CA TEST: CPT

## 2024-09-12 ENCOUNTER — OFFICE VISIT (OUTPATIENT)
Dept: OBGYN CLINIC | Facility: CLINIC | Age: 54
End: 2024-09-12
Payer: COMMERCIAL

## 2024-09-12 ENCOUNTER — APPOINTMENT (OUTPATIENT)
Dept: RADIOLOGY | Facility: CLINIC | Age: 54
End: 2024-09-12
Payer: COMMERCIAL

## 2024-09-12 VITALS
SYSTOLIC BLOOD PRESSURE: 146 MMHG | WEIGHT: 199 LBS | HEART RATE: 90 BPM | DIASTOLIC BLOOD PRESSURE: 74 MMHG | BODY MASS INDEX: 27.86 KG/M2 | HEIGHT: 71 IN

## 2024-09-12 DIAGNOSIS — M25.511 RIGHT SHOULDER PAIN, UNSPECIFIED CHRONICITY: ICD-10-CM

## 2024-09-12 DIAGNOSIS — M75.01 ADHESIVE CAPSULITIS OF RIGHT SHOULDER: Primary | ICD-10-CM

## 2024-09-12 PROCEDURE — 73030 X-RAY EXAM OF SHOULDER: CPT

## 2024-09-12 PROCEDURE — 99204 OFFICE O/P NEW MOD 45 MIN: CPT | Performed by: ORTHOPAEDIC SURGERY

## 2024-09-12 RX ORDER — EMPAGLIFLOZIN, METFORMIN HYDROCHLORIDE 5; 1000 MG/1; MG/1
TABLET, EXTENDED RELEASE ORAL
COMMUNITY
Start: 2024-07-08

## 2024-09-12 RX ORDER — CYCLOBENZAPRINE HCL 10 MG
TABLET ORAL
COMMUNITY
Start: 2024-08-24

## 2024-09-12 RX ORDER — EZETIMIBE 10 MG/1
TABLET ORAL
COMMUNITY
Start: 2024-08-19

## 2024-09-12 RX ORDER — OMEGA-3-ACID ETHYL ESTERS 1 G/1
CAPSULE, LIQUID FILLED ORAL
COMMUNITY
Start: 2024-08-24

## 2024-09-12 RX ORDER — EMPAGLIFLOZIN AND METFORMIN HYDROCHLORIDE 5; 1000 MG/1; MG/1
TABLET ORAL
COMMUNITY
Start: 2022-07-28

## 2024-09-12 RX ORDER — ICOSAPENT ETHYL 1 G/1
CAPSULE ORAL
COMMUNITY
Start: 2022-05-03

## 2024-09-12 RX ORDER — DICLOFENAC SODIUM 75 MG/1
75 TABLET, DELAYED RELEASE ORAL 2 TIMES DAILY
Qty: 60 TABLET | Refills: 0 | Status: SHIPPED | OUTPATIENT
Start: 2024-09-12

## 2024-09-12 NOTE — PROGRESS NOTES
Assessment/Plan:  1. Adhesive capsulitis of right shoulder  XR shoulder 2+ vw right    diclofenac (VOLTAREN) 75 mg EC tablet    Ambulatory referral to Physical Therapy          Tony has right-sided shoulder pain consistent with adhesive capsulitis.  He has lost abduction and external rotation in his right shoulder but maintains appropriate strength.  He may have had a rotator cuff injury or impingement initially and this has progressed to frozen shoulder over the last 5 months.  I recommended formal physical therapy as initial treatment.  He will also change up the anti-inflammatory medications and take these more consistently.  We also discussed possibility of an ultrasound-guided glenohumeral injection in the future if his pain would persist however is a diabetic we will try to avoid this if possible.  Follow-up in 4 to 6 weeks for repeat evaluation.    Subjective:   Tony Ramsay is a 54 y.o. male who presents to the office for evaluation for right-sided shoulder pain.  He states he may have tweaked his shoulder in May of this past year lifting something heavy but did not feel severe discomfort or pop.  He noticed over the next couple months he has had increasing discomfort throughout his shoulder as well as pain radiating up into his shoulder blade and trapezius region.  He feels like his range of motion is reduced.  He did go to his PCP and was given anti-inflammatories and muscle relaxers which have not helped.  He is a diabetic but his last A1c he states was 6.4.      Review of Systems   Constitutional:  Negative for chills, fever and unexpected weight change.   HENT:  Negative for hearing loss, nosebleeds and sore throat.    Eyes:  Negative for pain, redness and visual disturbance.   Respiratory:  Negative for cough, shortness of breath and wheezing.    Cardiovascular:  Negative for chest pain, palpitations and leg swelling.   Gastrointestinal:  Negative for abdominal pain, nausea and vomiting.    Endocrine: Negative for polyphagia and polyuria.   Genitourinary:  Negative for dysuria and hematuria.   Musculoskeletal:         See HPI   Skin:  Negative for rash and wound.   Neurological:  Negative for dizziness, numbness and headaches.   Psychiatric/Behavioral:  Negative for decreased concentration and suicidal ideas. The patient is not nervous/anxious.          Past Medical History:   Diagnosis Date    Diabetes mellitus (HCC)     Gout     High cholesterol     Tendonitis        History reviewed. No pertinent surgical history.    History reviewed. No pertinent family history.    Social History     Occupational History    Not on file   Tobacco Use    Smoking status: Never    Smokeless tobacco: Never   Vaping Use    Vaping status: Never Used   Substance and Sexual Activity    Alcohol use: No    Drug use: No    Sexual activity: Not on file         Current Outpatient Medications:     Continuous Glucose Sensor (FreeStyle Christian 2 Sensor) MISC, , Disp: , Rfl:     Empagliflozin-metFORMIN HCl (Synjardy) 5-1000 MG TABS, , Disp: , Rfl:     ezetimibe (ZETIA) 10 mg tablet, , Disp: , Rfl:     omega-3-acid ethyl esters (LOVAZA) 1 g capsule, , Disp: , Rfl:     cholecalciferol (VITAMIN D3) 400 units tablet, Take 400 Units by mouth daily (Patient not taking: Reported on 9/12/2024), Disp: , Rfl:     colchicine (COLCRYS) 0.6 mg tablet, Take 1 tablet (0.6 mg total) by mouth daily for 4 days, Disp: 4 tablet, Rfl: 0    colesevelam (WELCHOL) 625 mg tablet, , Disp: , Rfl:     cyclobenzaprine (FLEXERIL) 10 mg tablet, , Disp: , Rfl:     diclofenac sodium (VOLTAREN) 1 %, Apply 2 g topically 4 (four) times a day, Disp: 240 g, Rfl: 0    etodolac (LODINE) 400 MG tablet, Take 1 tablet (400 mg total) by mouth 2 (two) times a day for 10 days, Disp: 20 tablet, Rfl: 0    Icosapent Ethyl (Vascepa) 1 g CAPS, , Disp: , Rfl:     meloxicam (Mobic) 15 mg tablet, Take 1 tablet (15 mg total) by mouth daily, Disp: 15 tablet, Rfl: 0    Synjardy XR 5-1000  MG TB24, , Disp: , Rfl:     No Known Allergies    Objective:  Vitals:    09/12/24 0957   BP: 146/74   Pulse: 90            Right Shoulder Exam     Tenderness   Right shoulder tenderness location: Trapezius tenderness.    Range of Motion   Active abduction:  90 abnormal   Passive abduction:  90 abnormal   External rotation:  50 abnormal   Forward flexion:  150 abnormal   Internal rotation 0 degrees:  Sacrum abnormal     Muscle Strength   Abduction: 4/5   Internal rotation: 5/5   External rotation: 5/5   Supraspinatus: 5/5   Subscapularis: 5/5   Biceps: 5/5     Tests   Sams test: positive  Impingement: positive  Drop arm: negative    Other   Erythema: absent  Sensation: normal  Pulse: present      Left Shoulder Exam     Range of Motion   Active abduction:  normal   Passive abduction:  normal   Extension:  normal   External rotation:  normal   Forward flexion:  normal              Physical Exam  Vitals reviewed.   Constitutional:       Appearance: He is well-developed.   HENT:      Head: Normocephalic and atraumatic.   Eyes:      Conjunctiva/sclera: Conjunctivae normal.      Pupils: Pupils are equal, round, and reactive to light.   Cardiovascular:      Rate and Rhythm: Normal rate.      Pulses: Normal pulses.   Pulmonary:      Effort: Pulmonary effort is normal. No respiratory distress.   Musculoskeletal:      Cervical back: Normal range of motion and neck supple.      Comments: As noted in HPI   Skin:     General: Skin is warm and dry.   Neurological:      General: No focal deficit present.      Mental Status: He is alert and oriented to person, place, and time.   Psychiatric:         Mood and Affect: Mood normal.         Behavior: Behavior normal.         I have personally reviewed pertinent films in PACS and my interpretation is as follows:  X-ray of the right shoulder demonstrates no evidence of acute fracture or significant degenerative change.      This document was created using speech voice recognition  software.   Grammatical errors, random word insertions, pronoun errors, and incomplete sentences are an occasional consequence of this system due to software limitations, ambient noise, and hardware issues.   Any formal questions or concerns about content, text, or information contained within the body of this dictation should be directly addressed to the provider for clarification.

## 2024-09-13 ENCOUNTER — EVALUATION (OUTPATIENT)
Dept: PHYSICAL THERAPY | Facility: CLINIC | Age: 54
End: 2024-09-13
Payer: COMMERCIAL

## 2024-09-13 DIAGNOSIS — M75.01 ADHESIVE CAPSULITIS OF RIGHT SHOULDER: Primary | ICD-10-CM

## 2024-09-13 PROCEDURE — 97161 PT EVAL LOW COMPLEX 20 MIN: CPT | Performed by: PHYSICAL THERAPIST

## 2024-09-13 NOTE — PROGRESS NOTES
PT Evaluation     Today's date: 2024  Patient name: Tony Ramsay  : 1970  MRN: [id number]  Referring provider: Sergio Aviles DO  Dx:   Encounter Diagnosis     ICD-10-CM    1. Adhesive capsulitis of right shoulder  M75.01 Ambulatory referral to Physical Therapy                     Assessment  Impairments: abnormal muscle firing, abnormal muscle tone, abnormal or restricted ROM, activity intolerance, impaired physical strength, lacks appropriate home exercise program, pain with function, scapular dyskinesis, poor posture  and poor body mechanics    Assessment details: Tony Ramsay is a 54 y.o. male who presents to physical therapy with pain, decreased UE range of motion, decreased UE strength, impaired function, decreased activity tolerance and poor posture. Patient's clinical presentation is consistent with their referring diagnosis of Adhesive capsulitis of right shoulder  (primary encounter diagnosis). The pt presents with functional limitations of ADLs, recreational activities, self-care and reaching. Pt would benefit from physical therapy services to address these limitations and maximize function. Pt was instructed and educated on good sitting posture today and demonstrates understanding.       Understanding of Dx/Px/POC: good     Prognosis: good    Goals  Short term goals:  (3 weeks)  1. Patient will have pain level 2/10 right  shoulder at rest  2. Patient will report a 50% improvement in symptoms with ADL's  3. Patient will demonstrate appropriate scapulohumeral rhythm with reaching shoulder height and below  4. Patient will have improved right shoulder ROM by 20 degrees    Long term goals: (6 weeks)  1. Patient will report 85 % improvement improvement in symptoms with ADL's  2. Patient will have pain level 2/10 right shoulder with ADL's   3. Patient will demonstrate appropriate scapulohumeral rhythm with reaching overhead  4. Patient will be independent in a comprehensive home exercise  program      Plan  Patient would benefit from: PT eval and skilled physical therapy  Planned modality interventions: cryotherapy and thermotherapy: hydrocollator packs    Planned therapy interventions: joint mobilization, manual therapy, massage, neuromuscular re-education, patient education, postural training, strengthening, stretching, therapeutic activities, ADL training, functional ROM exercises, home exercise program, abdominal trunk stabilization and therapeutic exercise    Frequency: 2x week  Duration in weeks: 6  Treatment plan discussed with: patient        Subjective Evaluation    History of Present Illness  Mechanism of injury: He reports since May he has had right shoulder pain.  He followed up with Dr. Aviles. He had an x-ray.  He was prescribed diclofenac.  He was then referred to PT.  Patient Goals  Patient goals for therapy: decreased pain    Pain  Current pain ratin  At best pain ratin  At worst pain ratin  Location: Right posterior shld, right lateral shld  Quality: throbbing, knife-like and dull ache  Relieving factors: heat, support and medications  Aggravating factors: lifting and overhead activity (carrying)    Social Support    Employment status: not working (retired)  Hand dominance: right  Exercise history: House work        Objective     Postural Observations  Seated posture: poor      Palpation     Right   Hypertonic in the infraspinatus, teres major and upper trapezius.   Tenderness of the infraspinatus and teres major.     Passive Range of Motion   Left Shoulder   Flexion: 180 degrees   Abduction: 180 degrees   External rotation 0°: 90 degrees   Internal rotation 0°: 90 degrees     Right Shoulder   Flexion: 100 degrees   Abduction: 55 degrees   External rotation 0°: 22 degrees   Internal rotation 0°: 61 degrees     Scapular Mobility     Right Shoulder   Scapular mobility: poor    Scapular Mobility beyond 90° FF   Scapular elevation: minimal    Strength/Myotome Testing      Left Shoulder     Planes of Motion   Flexion: 5   Abduction: 5   External rotation at 0°: 5   Internal rotation at 0°: 5     Right Shoulder     Planes of Motion   Flexion: 4-   Abduction: 4-   External rotation at 0°: 4   Internal rotation at 0°: 4                  Eval/ Re-eval Auth #/ Referral # Total visits Start date  Expiration date Total active units  Total manual units  PT only or  PT+OT?   9/13/24 9/13              Total units authorized                Total units remaining                    Precautions: DM        Specialty Daily Treatment Diary       Manuals 9/13/24       Visit # 1       STM UT, pec group        PROM shld        ST joint mobs                Warm-up        NuStep        Neuro Re-Ed        Posture correct        Scap squeeze                                Ther Ex        TB row        TB ext        Ball rolls 20       Pulleys        AROM ER        Open books        Wand flex                Ther Activity                                Modalities

## 2024-09-16 ENCOUNTER — OFFICE VISIT (OUTPATIENT)
Dept: PHYSICAL THERAPY | Facility: CLINIC | Age: 54
End: 2024-09-16
Payer: COMMERCIAL

## 2024-09-16 DIAGNOSIS — M75.01 ADHESIVE CAPSULITIS OF RIGHT SHOULDER: Primary | ICD-10-CM

## 2024-09-16 PROCEDURE — 97110 THERAPEUTIC EXERCISES: CPT | Performed by: PHYSICAL THERAPIST

## 2024-09-16 PROCEDURE — 97112 NEUROMUSCULAR REEDUCATION: CPT | Performed by: PHYSICAL THERAPIST

## 2024-09-16 NOTE — PROGRESS NOTES
Daily Note     Today's date: 2024  Patient name: Tony Ramsay  : 1970  MRN: [id number]  Referring provider: Sergio Aviles DO  Dx:   Encounter Diagnosis     ICD-10-CM    1. Adhesive capsulitis of right shoulder  M75.01                      Subjective: Pain is about the same right shld.      Objective: See treatment diary below      Assessment: Tolerated treatment well. Patient would benefit from continued PT.  He felt pain relief following pulleys and cane stretches.  His HEP was updated.  He agreed to perform these as HEP.      Plan: Continue per plan of care.  Focus mostly on ROM restoration and development of stretches for HEP.     Eval/ Re-eval Auth #/ Referral # Total visits Start date  Expiration date Total active units  Total manual units  PT only or  PT+OT?   24             Total units authorized                Total units remaining                    Precautions: DM        Specialty Daily Treatment Diary       Manuals 24      Visit # 1 2      STM UT, pec group        PROM shld  5 min      ST joint mobs                Warm-up        NuStep  5 min      Neuro Re-Ed        Posture correct        Scap squeeze                                Ther Ex        TB row  CC row  12.5#  20      TB ext        Ball rolls 20       Pulleys  30      AROM ER  30      Open books  10 ea side      Wand flex  20      Wand ext  20      Wand abd  20              Ther Activity                                Modalities

## 2024-09-19 ENCOUNTER — OFFICE VISIT (OUTPATIENT)
Dept: PHYSICAL THERAPY | Facility: CLINIC | Age: 54
End: 2024-09-19
Payer: COMMERCIAL

## 2024-09-19 DIAGNOSIS — M75.01 ADHESIVE CAPSULITIS OF RIGHT SHOULDER: Primary | ICD-10-CM

## 2024-09-19 PROCEDURE — 97110 THERAPEUTIC EXERCISES: CPT | Performed by: PHYSICAL THERAPIST

## 2024-09-19 PROCEDURE — 97112 NEUROMUSCULAR REEDUCATION: CPT | Performed by: PHYSICAL THERAPIST

## 2024-09-19 NOTE — PROGRESS NOTES
Daily Note     Today's date: 2024  Patient name: Tony aRmsay  : 1970  MRN: [id number]  Referring provider: Sergio Aviles DO  Dx:   Encounter Diagnosis     ICD-10-CM    1. Adhesive capsulitis of right shoulder  M75.01                      Subjective: He felt good following the last visit but pain returned.  He has 8/10 pain today.        Objective: See treatment diary below      Assessment: Tolerated treatment well. Patient would benefit from continued PT.  Added scap squeeze which he agreed to perform at home.  Lowered weight on rows this session due to some soreness following the exercise last visit.        Plan: Continue per plan of care.  Focus mostly on ROM restoration and development of stretches for HEP.         Eval/ Re-eval Auth #/ Referral # Total visits Start date  Expiration date Total active units  Total manual units  PT only or  PT+OT?   24            Total units authorized                Total units remaining                    Precautions: DM        Specialty Daily Treatment Diary       Manuals 24     Visit # 1 2 3     STM UT, pec group        PROM shld  5 min 6 min     ST joint mobs                Warm-up        NuStep  5 min 5 min     Neuro Re-Ed        Posture correct        Scap squeeze   20                             Ther Ex        TB row  CC row  12.5#  20 CC row  7.5#  20     TB ext        Ball rolls 20       Pulleys  30 30     AROM ER  30 30     Open books  10 ea side 10     Wand flex  20 20     Wand ext  20 20     Wand abd  20 20             Ther Activity                                Modalities

## 2024-09-24 ENCOUNTER — OFFICE VISIT (OUTPATIENT)
Dept: PHYSICAL THERAPY | Facility: CLINIC | Age: 54
End: 2024-09-24
Payer: COMMERCIAL

## 2024-09-24 DIAGNOSIS — M75.01 ADHESIVE CAPSULITIS OF RIGHT SHOULDER: Primary | ICD-10-CM

## 2024-09-24 PROCEDURE — 97112 NEUROMUSCULAR REEDUCATION: CPT | Performed by: PHYSICAL THERAPIST

## 2024-09-24 PROCEDURE — 97110 THERAPEUTIC EXERCISES: CPT | Performed by: PHYSICAL THERAPIST

## 2024-09-24 NOTE — PROGRESS NOTES
Daily Note     Today's date: 2024  Patient name: Tony Ramsay  : 1970  MRN: [id number]  Referring provider: Sergio Aviles DO  Dx:   Encounter Diagnosis     ICD-10-CM    1. Adhesive capsulitis of right shoulder  M75.01                      Subjective: He feels frustrated about still having pain.   He also noted feeling more loose following PT sessions.        Objective: See treatment diary below      Assessment: Tolerated treatment well. Patient would benefit from continued PT.  Discussed expected timeframe for recovery of frozen shoulder. He agreed to understanding that 9 month minimum time is expected. Also discussed with patient to modify the frequency and reps according to his symptoms.  He agreed to try and do less reps more frequently throughout the day.      Plan: Continue per plan of care.  Focus mostly on ROM restoration and development of stretches for HEP.         Eval/ Re-eval Auth #/ Referral # Total visits Start date  Expiration date Total active units  Total manual units  PT only or  PT+OT?   24            Total units authorized                Total units remaining                    Precautions: DM        Specialty Daily Treatment Diary       Manuals 24    Visit # 1 2 3 4    STM UT, pec group        PROM shld  5 min 6 min 7 min    ST joint mobs                Warm-up        NuStep  5 min 5 min 7 min    Neuro Re-Ed        Posture correct        Scap squeeze   20 20                            Ther Ex        TB row  CC row  12.5#  20 CC row  7.5#  20 20  GTB    TB ext    20  GTB    Ball rolls 20       Pulleys  30 30 30    AROM ER  30 30 30    Open books  10 ea side 10 10 ea side    Wand flex  20 20 20    Wand ext  20 20 20 ea  ext and IR w cane     Wand abd  20 20 20            Ther Activity                                Modalities

## 2024-09-26 ENCOUNTER — OFFICE VISIT (OUTPATIENT)
Dept: OBGYN CLINIC | Facility: CLINIC | Age: 54
End: 2024-09-26
Payer: COMMERCIAL

## 2024-09-26 VITALS
WEIGHT: 199 LBS | HEIGHT: 71 IN | DIASTOLIC BLOOD PRESSURE: 79 MMHG | HEART RATE: 90 BPM | SYSTOLIC BLOOD PRESSURE: 165 MMHG | BODY MASS INDEX: 27.86 KG/M2

## 2024-09-26 DIAGNOSIS — M75.01 ADHESIVE CAPSULITIS OF RIGHT SHOULDER: Primary | ICD-10-CM

## 2024-09-26 PROCEDURE — 99213 OFFICE O/P EST LOW 20 MIN: CPT | Performed by: ORTHOPAEDIC SURGERY

## 2024-09-26 PROCEDURE — 20611 DRAIN/INJ JOINT/BURSA W/US: CPT | Performed by: ORTHOPAEDIC SURGERY

## 2024-09-26 RX ORDER — LIDOCAINE HYDROCHLORIDE 10 MG/ML
4 INJECTION, SOLUTION INFILTRATION; PERINEURAL
Status: COMPLETED | OUTPATIENT
Start: 2024-09-26 | End: 2024-09-26

## 2024-09-26 RX ORDER — DEXAMETHASONE SODIUM PHOSPHATE 10 MG/ML
40 INJECTION, SOLUTION INTRAMUSCULAR; INTRAVENOUS
Status: COMPLETED | OUTPATIENT
Start: 2024-09-26 | End: 2024-09-26

## 2024-09-26 RX ADMIN — LIDOCAINE HYDROCHLORIDE 4 ML: 10 INJECTION, SOLUTION INFILTRATION; PERINEURAL at 10:00

## 2024-09-26 RX ADMIN — DEXAMETHASONE SODIUM PHOSPHATE 40 MG: 10 INJECTION, SOLUTION INTRAMUSCULAR; INTRAVENOUS at 10:00

## 2024-09-26 NOTE — PROGRESS NOTES
"Assessment/Plan:  1. Adhesive capsulitis of right shoulder  Large joint arthrocentesis: R glenohumeral        Scribe Attestation      I,:  Ramon Da Silva MA am acting as a scribe while in the presence of the attending physician.:       I,:  Sergio Aviles, DO personally performed the services described in this documentation    as scribed in my presence.:             Tony continues to struggle with right shoulder pain and stiffness due to his adhesive capsulitis.  We discussed the condition in detail and explained that this does take time to resolve.  I can reduce the pain with a steroid injection today.  We discussed the risks and benefits and he was amenable to proceed.  He tolerated the ultrasound-guided glenohumeral joint injection well with out acute complications.  We discussed that he should monitor his blood glucose levels closely as they are an injection can cause a spike in those numbers.  He verbalized an understanding.  Tony will continue with physical therapy.  I would like him to follow-up with me in 4 weeks.  If the continues to have significant pain we may consider an MRI to see if there is a rotator cuff tear component to this.  He was agreeable this plan of care and had no further questions.    Large joint arthrocentesis: R glenohumeral  Universal Protocol:  procedure performed by consultantConsent: Verbal consent obtained.  Risks and benefits: risks, benefits and alternatives were discussed  Consent given by: patient  Time out: Immediately prior to procedure a \"time out\" was called to verify the correct patient, procedure, equipment, support staff and site/side marked as required.  Patient understanding: patient states understanding of the procedure being performed  Patient consent: the patient's understanding of the procedure matches consent given  Radiology Images displayed and confirmed. If images not available, report reviewed: imaging studies available  Patient identity confirmed: verbally with " patient  Supporting Documentation  Indications: pain   Procedure Details  Location: shoulder - R glenohumeral  Preparation: Patient was prepped and draped in the usual sterile fashion  Needle size: 25 G  Ultrasound guidance: yes  Approach: posterior  Medications administered: 4 mL lidocaine 1 %; 40 mg dexamethasone 100 mg/10 mL    Patient tolerance: patient tolerated the procedure well with no immediate complications  Dressing:  Sterile dressing applied          Subjective:   Tony Ramsay is a right hand dominant diabetic 54 y.o. male who presents to the office today for follow-up evaluation of his right shoulder.  At previous visit he was diagnosed with a he is of capsulitis and prescribed physical therapy.  States he has been attending physical therapy and unfortunately continues to have right shoulder pain and stiffness.  He complains of a persistent ache at the lateral aspect of the shoulder that worsens with range of motion.  This pain can radiate into the upper arm.  He is having difficulty sleeping secondary to his pain.  He states he only sleeps approximately 2 hours at a time and his shoulder pain will wake him.  Should he roll onto his right side the shoulder pain can be quite significant.  He denies numbness or tingling.  He is very frustrated with his progress thus far.      Review of Systems   Constitutional:  Negative for chills, fever and unexpected weight change.   HENT:  Negative for hearing loss, nosebleeds and sore throat.    Eyes:  Negative for pain, redness and visual disturbance.   Respiratory:  Negative for cough, shortness of breath and wheezing.    Cardiovascular:  Negative for chest pain, palpitations and leg swelling.   Gastrointestinal:  Negative for abdominal pain, nausea and vomiting.   Endocrine: Negative for polyphagia and polyuria.   Genitourinary:  Negative for dysuria and hematuria.   Musculoskeletal:         See HPI   Skin:  Negative for rash and wound.   Neurological:   Negative for dizziness, numbness and headaches.   Psychiatric/Behavioral:  Negative for decreased concentration and suicidal ideas. The patient is not nervous/anxious.          Past Medical History:   Diagnosis Date    Diabetes mellitus (HCC)     Gout     High cholesterol     Tendonitis        History reviewed. No pertinent surgical history.    History reviewed. No pertinent family history.    Social History     Occupational History    Not on file   Tobacco Use    Smoking status: Never    Smokeless tobacco: Never   Vaping Use    Vaping status: Never Used   Substance and Sexual Activity    Alcohol use: No    Drug use: No    Sexual activity: Not on file         Current Outpatient Medications:     cholecalciferol (VITAMIN D3) 400 units tablet, Take 400 Units by mouth daily (Patient not taking: Reported on 9/12/2024), Disp: , Rfl:     colchicine (COLCRYS) 0.6 mg tablet, Take 1 tablet (0.6 mg total) by mouth daily for 4 days, Disp: 4 tablet, Rfl: 0    colesevelam (WELCHOL) 625 mg tablet, , Disp: , Rfl:     Continuous Glucose Sensor (FreeStyle Christian 2 Sensor) MISC, , Disp: , Rfl:     cyclobenzaprine (FLEXERIL) 10 mg tablet, , Disp: , Rfl:     diclofenac (VOLTAREN) 75 mg EC tablet, Take 1 tablet (75 mg total) by mouth 2 (two) times a day, Disp: 60 tablet, Rfl: 0    diclofenac sodium (VOLTAREN) 1 %, Apply 2 g topically 4 (four) times a day, Disp: 240 g, Rfl: 0    Empagliflozin-metFORMIN HCl (Synjardy) 5-1000 MG TABS, , Disp: , Rfl:     ezetimibe (ZETIA) 10 mg tablet, , Disp: , Rfl:     Icosapent Ethyl (Vascepa) 1 g CAPS, , Disp: , Rfl:     omega-3-acid ethyl esters (LOVAZA) 1 g capsule, , Disp: , Rfl:     Synjardy XR 5-1000 MG TB24, , Disp: , Rfl:     No Known Allergies    Objective:  Vitals:    09/26/24 0958   BP: 165/79   Pulse: 90       Right Shoulder Exam     Tenderness   Right shoulder tenderness location: Tenderness palpation over anterior glenohumeral joint.    Range of Motion   Active abduction:  90 abnormal    Passive abduction:  100 abnormal   External rotation:  30 abnormal     Muscle Strength   Abduction: 5/5   Internal rotation: 5/5   External rotation: 5/5   Supraspinatus: 5/5   Subscapularis: 5/5   Biceps: 5/5     Other   Erythema: absent  Sensation: normal  Pulse: present            Physical Exam  Vitals reviewed.   Constitutional:       Appearance: He is well-developed.   HENT:      Head: Normocephalic and atraumatic.   Eyes:      General:         Right eye: No discharge.         Left eye: No discharge.      Conjunctiva/sclera: Conjunctivae normal.   Cardiovascular:      Rate and Rhythm: Regular rhythm.   Pulmonary:      Effort: Pulmonary effort is normal. No respiratory distress.   Genitourinary:     Comments: As noted in the HPI.  Musculoskeletal:      Cervical back: Normal range of motion and neck supple.   Skin:     General: Skin is warm and dry.   Neurological:      Mental Status: He is alert and oriented to person, place, and time.   Psychiatric:         Behavior: Behavior normal.               This document was created using speech voice recognition software.   Grammatical errors, random word insertions, pronoun errors, and incomplete sentences are an occasional consequence of this system due to software limitations, ambient noise, and hardware issues.   Any formal questions or concerns about content, text, or information contained within the body of this dictation should be directly addressed to the provider for clarification.

## 2024-09-27 ENCOUNTER — APPOINTMENT (OUTPATIENT)
Dept: PHYSICAL THERAPY | Facility: CLINIC | Age: 54
End: 2024-09-27
Payer: COMMERCIAL

## 2024-10-02 ENCOUNTER — OFFICE VISIT (OUTPATIENT)
Dept: PHYSICAL THERAPY | Facility: CLINIC | Age: 54
End: 2024-10-02
Payer: COMMERCIAL

## 2024-10-02 DIAGNOSIS — M75.01 ADHESIVE CAPSULITIS OF RIGHT SHOULDER: Primary | ICD-10-CM

## 2024-10-02 PROCEDURE — 97112 NEUROMUSCULAR REEDUCATION: CPT

## 2024-10-02 PROCEDURE — 97110 THERAPEUTIC EXERCISES: CPT

## 2024-10-02 NOTE — PROGRESS NOTES
Daily Note     Today's date: 10/2/2024  Patient name: Tony Ramsay  : 1970  MRN: [id number]  Referring provider: Sergio Aviles DO  Dx:   Encounter Diagnosis     ICD-10-CM    1. Adhesive capsulitis of right shoulder  M75.01                      Subjective: Feeling better after cortisone shot, but still limited ROM with flexion. Some numbness and tingling down into right ring finger.       Objective: See treatment diary below      Assessment: Tolerated treatment well. Patient would benefit from continued PT in order to increase thoracic and shoulder mobility. Right arm flexion increased after thoracic extensions over back of chair followed by repeated cervical retractions and extensions but Tony attributed to cortizone shot. Decreased thoracic mobility on right T4-7. TTP in right sub scap. Is a good candidate for dry needling for nervous system down training. Decreased discomfort in arm after median nerve glides/ tensioners. PROM limited by pain with empty end feel.       Plan: Continue per plan of care.      Eval/ Re-eval Auth #/ Referral # Total visits Start date  Expiration date Total active units  Total manual units  PT only or  PT+OT?   24            Total units authorized                Total units remaining                    Precautions: DM        Specialty Daily Treatment Diary       Manuals 9/13/24 9/16/23 9/19/24 9/24/24 10/2/24   Visit # 1 2 3 4 5   STM UT, pec group        PROM shld  5 min 6 min 7 min 7 min with sub scap TPR   ST joint mobs     GH mobilization inferior and posterior        Thoracic mobs + Gr V T4-5   Warm-up     TPR R UT, sub scap   NuStep  5 min 5 min 7 min    Neuro Re-Ed     Cervical retraction into pillow 2*10; + ext 20x seated   Posture correct     Thoracic ext over chair 20x   Scap squeeze   20 20 20        Median nerve tensioners                   Ther Ex        TB row  CC row  12.5#  20 CC row  7.5#  20 20  GTB     TB ext    20  GTB    Ball rolls 20       Pulleys  30 30 30    AROM ER  30 30 30    Open books  10 ea side 10 10 ea side    Wand flex  20 20 20    Wand ext  20 20 20 ea  ext and IR w cane     Wand abd  20 20 20            Ther Activity     HEP review                           Modalities                           Access Code: [email or web address]  URL: [email or web address]  Date: 10/02/2024  Prepared by: Nancy Collier    Exercises  - Median Nerve Tensioner  - 1 x daily - 7 x weekly - 3 sets - 10 reps  - Seated Upper Trapezius Stretch  - 1 x daily - 7 x weekly - 3 sets - 10 reps  - Median Nerve Glide  - 1 x daily - 7 x weekly - 3 sets - 10 reps

## 2024-10-04 ENCOUNTER — OFFICE VISIT (OUTPATIENT)
Dept: PHYSICAL THERAPY | Facility: CLINIC | Age: 54
End: 2024-10-04
Payer: COMMERCIAL

## 2024-10-04 DIAGNOSIS — M75.01 ADHESIVE CAPSULITIS OF RIGHT SHOULDER: Primary | ICD-10-CM

## 2024-10-04 PROCEDURE — 97110 THERAPEUTIC EXERCISES: CPT | Performed by: PHYSICAL THERAPIST

## 2024-10-04 NOTE — PROGRESS NOTES
Daily Note     Today's date: 10/4/2024  Patient name: Tony Ramsay  : 1970  MRN: [id number]  Referring provider: Sergio Aviles DO  Dx:   Encounter Diagnosis     ICD-10-CM    1. Adhesive capsulitis of right shoulder  M75.01                      Subjective:  Some improvement in symptoms since last session.        Objective: See treatment diary below      Assessment: Tolerated treatment well.  He was sore under right scapula.  Added MFR to posterior serratus muscle.        Plan: Continue per plan of care.      Eval/ Re-eval Auth #/ Referral # Total visits Start date  Expiration date Total active units  Total manual units  PT only or  PT+OT?   24            Total units authorized                Total units remaining                    Precautions: DM        Specialty Daily Treatment Diary       Manuals 9/16/23 9/19/24 9/24/24 10/2/24 10/4/24   Visit # 2 3 4 5 6- foto   STM UT, pec group        PROM shld 5 min 6 min 7 min 7 min with sub scap TPR 4 min   ST joint mobs    GH mobilization inferior and posterior GHJ mobilization inferior and posterior       Thoracic mobs + Gr V T4-5 Upper T-S P to A mobs  2 min    STJ retraction mobs 2 min   Warm-up    TPR R UT, sub scap    NuStep 5 min 5 min 7 min     Neuro Re-Ed    Cervical retraction into pillow 2*10; + ext 20x seated 20n 3s  seated   Posture correct    Thoracic ext over chair 20x 20   Scap squeeze  20 20 20 20       Median nerve tensioners 20                   Ther Ex        TB row CC row  12.5#  20 CC row  7.5#  20 20  GTB     TB ext   20  GTB     Ball rolls        Pulleys 30 30 30  30   AROM ER 30 30 30     Open books 10 ea side 10 10 ea side     Wand flex 20 20 20  20   Wand ext 20 20 20 ea  ext and IR w cane      Wand abd 20 20 20             Ther Activity    HEP review                            Modalities                           Access Code: [email or web address]  URL:  [email or web address]  Date: 10/02/2024  Prepared by: Nancy Collier    Exercises  - Median Nerve Tensioner  - 1 x daily - 7 x weekly - 3 sets - 10 reps  - Seated Upper Trapezius Stretch  - 1 x daily - 7 x weekly - 3 sets - 10 reps  - Median Nerve Glide  - 1 x daily - 7 x weekly - 3 sets - 10 reps

## 2024-10-07 ENCOUNTER — OFFICE VISIT (OUTPATIENT)
Dept: PHYSICAL THERAPY | Facility: CLINIC | Age: 54
End: 2024-10-07
Payer: COMMERCIAL

## 2024-10-07 DIAGNOSIS — M75.01 ADHESIVE CAPSULITIS OF RIGHT SHOULDER: Primary | ICD-10-CM

## 2024-10-07 PROCEDURE — 97110 THERAPEUTIC EXERCISES: CPT

## 2024-10-07 PROCEDURE — 97112 NEUROMUSCULAR REEDUCATION: CPT

## 2024-10-07 NOTE — PROGRESS NOTES
Daily Note     Today's date: 10/7/2024  Patient name: Tony Ramsay  : 1970  MRN: [id number]  Referring provider: Sergio Aviles DO  Dx:   Encounter Diagnosis     ICD-10-CM    1. Adhesive capsulitis of right shoulder  M75.01                      Subjective: Pt reports 10/10 pain earlier today, has calmed a bit prior to coming in today. Feels that his neck and posterior shoulder are involved.       Objective: R shoulder PROM and AROM for overhead flexion and scaption improves w/ first rib mobs on R, reps of PROM on R - well maintained post.       Assessment: Tolerated treatment well. Patient demonstrated fatigue post treatment and would benefit from continued PT. Does well w/ below listed treatment, fatigue and soreness but overall improvement in mobility post. Added first rib mobility work in conjuntion w/ tspine mobility and lower cspine work. Seems to positively impact overhead motion w/ reps. Will look to increase intensity of strength program in coming visits. Most posterior chain work was more painful today than fatiguing. He also liked back buddy, felt relief after. Will look into getting this on his own.       Plan: Continue per plan of care. Post chain work, check on back buddy, tspine rotation work, prone work     Eval/ Re-eval Auth #/ Referral # Total visits Start date  Expiration date Total active units  Total manual units  PT only or  PT+OT?   24            Total units authorized                Total units remaining                    Precautions: DM        Specialty Daily Treatment Diary       Manuals 9/16/23 9/19/24 9/24/24 10/2/24 10/4/24 10/7/24   Visit # 2 3 4 5 6- foto 7   STM UT, pec group         PROM shld 5 min 6 min 7 min 7 min with sub scap TPR 4 min X10 mins CP    ST joint mobs    GH mobilization inferior and posterior GHJ mobilization inferior and posterior X2-3 mins CP        Thoracic mobs + Gr V T4-5 Upper T-S P to A mobs   2 min    STJ retraction mobs 2 min X3-4 mins     1st rib mobs on R 2x45 sec     Central gliders checked   Warm-up    TPR R UT, sub scap     NuStep 5 min 5 min 7 min      Neuro Re-Ed    Cervical retraction into pillow 2*10; + ext 20x seated 20n 3s  seated X20 total    Posture correct    Thoracic ext over chair 20x 20 x20   Scap squeeze  20 20 20 20 x20       Median nerve tensioners 20 rev         Green rows x20            Ther Ex         TB row CC row  12.5#  20 CC row  7.5#  20 20  GTB   Attempted, deferred    TB ext   20  GTB      Ball rolls         Pulleys 30 30 30  30 rev   AROM ER 30 30 30      Open books 10 ea side 10 10 ea side      Wand flex 20 20 20  20 3x10   Wand ext 20 20 20 ea  ext and IR w cane    Back buddy x 2-3 mins   Wand abd 20 20 20   Rev of anatomy and POC progressions x  5 mins            Ther Activity    HEP review                                Modalities                              Access Code: [email or web address]  URL: [email or web address]  Date: 10/02/2024  Prepared by: Nancy Collier    Exercises  - Median Nerve Tensioner  - 1 x daily - 7 x weekly - 3 sets - 10 reps  - Seated Upper Trapezius Stretch  - 1 x daily - 7 x weekly - 3 sets - 10 reps  - Median Nerve Glide  - 1 x daily - 7 x weekly - 3 sets - 10 reps

## 2024-10-08 ENCOUNTER — APPOINTMENT (OUTPATIENT)
Dept: PHYSICAL THERAPY | Facility: CLINIC | Age: 54
End: 2024-10-08
Payer: COMMERCIAL

## 2024-10-10 ENCOUNTER — OFFICE VISIT (OUTPATIENT)
Dept: PHYSICAL THERAPY | Facility: CLINIC | Age: 54
End: 2024-10-10
Payer: COMMERCIAL

## 2024-10-10 DIAGNOSIS — M75.01 ADHESIVE CAPSULITIS OF RIGHT SHOULDER: Primary | ICD-10-CM

## 2024-10-10 PROCEDURE — 97112 NEUROMUSCULAR REEDUCATION: CPT | Performed by: PHYSICAL THERAPIST

## 2024-10-10 PROCEDURE — 97110 THERAPEUTIC EXERCISES: CPT | Performed by: PHYSICAL THERAPIST

## 2024-10-10 NOTE — PROGRESS NOTES
Daily Note     Today's date: 10/10/2024  Patient name: Tony Ramsay  : 1970  MRN: [id number]  Referring provider: Sergio Aviles DO  Dx:   Encounter Diagnosis     ICD-10-CM    1. Adhesive capsulitis of right shoulder  M75.01                      Subjective:Some improvement in symptoms recently       Objective: R shoulder PROM and AROM for overhead flexion and scaption improves w/ first rib mobs on R, reps of PROM on R - well maintained post.       Assessment: Tolerated treatment well. He has been making gains with change in focus to upper T-S and C-S dysfunction and posture awareness.  Noted serratus posterior tenderness to palpation.        Plan: Continue per plan of care. Post chain work, check on back buddy, tspine rotation work, prone work,  dry needling attempt     Eval/ Re-eval Auth #/ Referral # Total visits Start date  Expiration date Total active units  Total manual units  PT only or  PT+OT?   24            Total units authorized                Total units remaining                    Precautions: DM        Specialty Daily Treatment Diary       Manuals 9/24/24 10/2/24 10/4/24 10/7/24    Visit # 4 5 6- foto 7    STM UT, pec group        PROM shld 7 min 7 min with sub scap TPR 4 min X10 mins CP     ST joint mobs  GH mobilization inferior and posterior GHJ mobilization inferior and posterior X2-3 mins CP       Thoracic mobs + Gr V T4-5 Upper T-S P to A mobs  2 min    STJ retraction mobs 2 min X3-4 mins     1st rib mobs on R 2x45 sec     Central gliders checked    Warm-up  TPR R UT, sub scap      NuStep 7 min       Neuro Re-Ed  Cervical retraction into pillow 2*10; + ext 20x seated 20n 3s  seated X20 total     Posture correct  Thoracic ext over chair 20x 20 x20    Scap squeeze 20 20 20 x20      Median nerve tensioners 20 rev        Green rows x20            Ther Ex        TB row 20  GTB   Attempted, deferred     TB ext 20  GTB       Ball rolls         Pulleys 30  30 rev    AROM ER 30       Open books 10 ea side       Wand flex 20  20 3x10    Wand ext 20 ea  ext and IR w cane    Back buddy x 2-3 mins    Wand abd 20   Rev of anatomy and POC progressions x  5 mins            Ther Activity  HEP review                              Modalities                           Access Code: [email or web address]  URL: [email or web address]  Date: 10/02/2024  Prepared by: Nancy Collier    Exercises  - Median Nerve Tensioner  - 1 x daily - 7 x weekly - 3 sets - 10 reps  - Seated Upper Trapezius Stretch  - 1 x daily - 7 x weekly - 3 sets - 10 reps  - Median Nerve Glide  - 1 x daily - 7 x weekly - 3 sets - 10 reps

## 2024-10-14 ENCOUNTER — OFFICE VISIT (OUTPATIENT)
Dept: PHYSICAL THERAPY | Facility: CLINIC | Age: 54
End: 2024-10-14
Payer: COMMERCIAL

## 2024-10-14 DIAGNOSIS — M75.01 ADHESIVE CAPSULITIS OF RIGHT SHOULDER: Primary | ICD-10-CM

## 2024-10-14 PROCEDURE — 97530 THERAPEUTIC ACTIVITIES: CPT

## 2024-10-14 PROCEDURE — 97140 MANUAL THERAPY 1/> REGIONS: CPT

## 2024-10-14 NOTE — PROGRESS NOTES
Daily Note     Today's date: 10/14/2024  Patient name: Tony Ramsay  : 1970  MRN: [id number]  Referring provider: Sergio Aviles DO  Dx:   Encounter Diagnosis     ICD-10-CM    1. Adhesive capsulitis of right shoulder  M75.01                      Subjective: No change since last in clinic - some sharp pain at back of right shoulder pain      Objective: See treatment diary below  Dry needling consent for reviewed and signed by patient. Pt educated on dry needling to include but not limited to: risks/benefits/aftercare instructions. Provided with educational handout on DN. All questions and concerns answered and addressed.     Assessment: Tolerated treatment well. Patient would benefit from continued PT to down train central nervous system and increase tissue extensibility of supraspinatus. Trialled dry needling today. Decreased tissue extensibility of right supraspinatus.   Pt verbally consented to dry needling treatment session. Risks/benefits/aftercare instructions reviewed. All questions/concerns addressed.  Pt in massage chair. Hand hygiene performed pre and post DN treatment session. Placement of needles in pathological tissue.   10 sub occipital/ cervical spine, 10 right scapula, 4 ears (needle location).  Total treatment duration to include set up/break down/in situ: 40 minutes. Patient was supervised by clinician throughout entirety of treatment session to include when DN in situ; clinician next to patient. All needles counted upon insertion and removal-- 24 needles. All accounted for and disposed in appropriate sharp container.     No adverse reaction to treatment. Pt advised may experience muscular fatigue and soreness. Pt verbalized understanding.          Plan: Continue per plan of care.      Eval/ Re-eval Auth #/ Referral # Total visits Start date  Expiration date Total active units  Total manual units  PT only or  PT+OT?   24             Total units authorized                Total units remaining                    Precautions: DM        Specialty Daily Treatment Diary       Manuals 9/24/24 10/2/24 10/4/24 10/7/24 10/14/24   Visit # 4 5 6- foto 7    STM UT, pec group        PROM shld 7 min 7 min with sub scap TPR 4 min X10 mins CP     ST joint mobs  GH mobilization inferior and posterior GHJ mobilization inferior and posterior X2-3 mins CP       Thoracic mobs + Gr V T4-5 Upper T-S P to A mobs  2 min    STJ retraction mobs 2 min X3-4 mins     1st rib mobs on R 2x45 sec     Central gliders checked DN - see above   Warm-up  TPR R UT, sub scap      NuStep 7 min       Neuro Re-Ed  Cervical retraction into pillow 2*10; + ext 20x seated 20n 3s  seated X20 total     Posture correct  Thoracic ext over chair 20x 20 x20    Scap squeeze 20 20 20 x20      Median nerve tensioners 20 rev        Green rows x20            Ther Ex        TB row 20  GTB   Attempted, deferred     TB ext 20  GTB       Ball rolls        Pulleys 30  30 rev    AROM ER 30       Open books 10 ea side       Wand flex 20  20 3x10    Wand ext 20 ea  ext and IR w cane    Back buddy x 2-3 mins    Wand abd 20   Rev of anatomy and POC progressions x  5 mins            Ther Activity  HEP review   10' pat ed on DN                           Modalities                           Access Code: [email or web address]  URL: [email or web address]  Date: 10/02/2024  Prepared by: Nancy Collier    Exercises  - Median Nerve Tensioner  - 1 x daily - 7 x weekly - 3 sets - 10 reps  - Seated Upper Trapezius Stretch  - 1 x daily - 7 x weekly - 3 sets - 10 reps  - Median Nerve Glide  - 1 x daily - 7 x weekly - 3 sets - 10 reps

## 2024-10-15 ENCOUNTER — APPOINTMENT (OUTPATIENT)
Dept: PHYSICAL THERAPY | Facility: CLINIC | Age: 54
End: 2024-10-15
Payer: COMMERCIAL

## 2024-10-17 ENCOUNTER — OFFICE VISIT (OUTPATIENT)
Dept: PHYSICAL THERAPY | Facility: CLINIC | Age: 54
End: 2024-10-17
Payer: COMMERCIAL

## 2024-10-17 DIAGNOSIS — M75.01 ADHESIVE CAPSULITIS OF RIGHT SHOULDER: Primary | ICD-10-CM

## 2024-10-17 PROCEDURE — 97110 THERAPEUTIC EXERCISES: CPT | Performed by: PHYSICAL THERAPIST

## 2024-10-17 PROCEDURE — 97112 NEUROMUSCULAR REEDUCATION: CPT | Performed by: PHYSICAL THERAPIST

## 2024-10-17 NOTE — PROGRESS NOTES
Daily Note     Today's date: 10/17/2024  Patient name: Tony Ramsay  : 1970  MRN: [id number]  Referring provider: Sergio Aviles DO  Dx:   Encounter Diagnosis     ICD-10-CM    1. Adhesive capsulitis of right shoulder  M75.01                      Subjective: Noted pain right C-S , scap and shoulder.  He does not feel much progress in pain level.  His right shoulder ROM has a little improvement      Objective: See treatment diary below      Assessment: Tolerated treatment well. Patient would benefit from continued PT to restore normal ROM right shoulder as well as scapular mechanics.  Discussed importance of posture with patient for correct positioning of T-S , C-S and shoulder complex.  He agreed to understanding this.        Plan: Continue per plan of care.   He has a follow up visit with Dr. Aviles next week       Eval/ Re-eval Auth #/ Referral # Total visits Start date  Expiration date Total active units  Total manual units  PT only or  PT+OT?   24  90  24            Total units authorized                Total units remaining                    Precautions: DM        Specialty Daily Treatment Diary       Manuals 10/2/24 10/4/24 10/7/24 10/14/24 10/17/24   Visit # 5 6- foto 7 8 9   STM UT, pec group     4 min UT and levator   PROM shld 7 min with sub scap TPR 4 min X10 mins CP   6 min   ST joint mobs GH mobilization inferior and posterior GHJ mobilization inferior and posterior X2-3 mins CP   2 min upper T-S P to A mobs    Thoracic mobs + Gr V T4-5 Upper T-S P to A mobs  2 min    STJ retraction mobs 2 min X3-4 mins     1st rib mobs on R 2x45 sec     Central gliders checked DN - see above    Warm-up TPR R UT, sub scap       NuStep        Neuro Re-Ed Cervical retraction into pillow 2*10; + ext 20x seated 20n 3s  seated X20 total   C-S retraction  20   Posture correct Thoracic ext over chair 20x 20 x20  20   Scap squeeze 20 20 x20  20    Median  nerve tensioners 20 rev  10      Green rows x20             Ther Ex        TB row   Attempted, deferred   20   GTB   TB ext     20   GTB   Ball rolls        Pulleys  30 rev  20   AROM ER        Open books     Supine pec stretch  10x   Wand flex  20 3x10  20   Wand ext   Back buddy x 2-3 mins     Wand abd   Rev of anatomy and POC progressions x  5 mins             Ther Activity HEP review   10' pat ed on DN                            Modalities                           Access Code: [email or web address]  URL: [email or web address]  Date: 10/02/2024  Prepared by: Nancy Collier    Exercises  - Median Nerve Tensioner  - 1 x daily - 7 x weekly - 3 sets - 10 reps  - Seated Upper Trapezius Stretch  - 1 x daily - 7 x weekly - 3 sets - 10 reps  - Median Nerve Glide  - 1 x daily - 7 x weekly - 3 sets - 10 reps

## 2024-10-22 ENCOUNTER — OFFICE VISIT (OUTPATIENT)
Dept: PHYSICAL THERAPY | Facility: CLINIC | Age: 54
End: 2024-10-22
Payer: COMMERCIAL

## 2024-10-22 DIAGNOSIS — M75.01 ADHESIVE CAPSULITIS OF RIGHT SHOULDER: Primary | ICD-10-CM

## 2024-10-22 PROCEDURE — 97110 THERAPEUTIC EXERCISES: CPT | Performed by: PHYSICAL THERAPIST

## 2024-10-22 NOTE — PROGRESS NOTES
Daily Note     Today's date: 10/22/2024  Patient name: Tony Ramsay  : 1970  MRN: [id number]  Referring provider: Sergio Aviles DO  Dx:   Encounter Diagnosis     ICD-10-CM    1. Adhesive capsulitis of right shoulder  M75.01                      Subjective:  Some improvement in radicular symptoms down right arm but right shoulder symptoms continue.  This is frustrating to him.      Objective: See treatment diary below      Assessment: Tolerated treatment well. Patient would benefit from continued PT .  Had a discussion today about posture and it's effect on T-S , C-S and shoulder.  He agreed to understanding this and stated he will try to focus on good posture when seated.       Plan: Continue per plan of care.          Eval/ Re-eval Auth #/ Referral # Total visits Start date  Expiration date Total active units  Total manual units  PT only or  PT+OT?   24  90  24           Total units authorized                Total units remaining                    Precautions: DM        Specialty Daily Treatment Diary       Manuals 10/4/24 10/7/24 10/14/24 10/17/24 10/22/24   Visit # 6- foto 7 8 9 10   STM UT, pec group    4 min UT and levator 4 min   PROM shld 4 min X10 mins CP   6 min 4 min   ST joint mobs GHJ mobilization inferior and posterior X2-3 mins CP   2 min upper T-S P to A mobs 2 min upper T-S P to A mobs    Upper T-S P to A mobs  2 min    STJ retraction mobs 2 min X3-4 mins     1st rib mobs on R 2x45 sec     Central gliders checked DN - see above  2 min MFR pec minor   Warm-up        NuStep        Neuro Re-Ed 20n 3s  seated X20 total   C-S retraction  20 20 C-S retract   Posture correct 20 x20  20 20   Scap squeeze 20 x20  20 20    20 rev  10                    Ther Ex        TB row  Attempted, deferred   20   GTB 20   GTB   TB ext    20   GTB 20   GTB   Ball rolls        Pulleys 30 rev  20 20   AROM ER        Open books    Supine pec stretch   10x Open books  10x ea side   Wand flex 20 3x10  20 20   Wand ext  Back buddy x 2-3 mins      Wand abd  Rev of anatomy and POC progressions x  5 mins              Ther Activity   10' pat ed on DN                             Modalities                           Access Code: [email or web address]  URL: [email or web address]  Date: 10/02/2024  Prepared by: Nancy Collier    Exercises  - Median Nerve Tensioner  - 1 x daily - 7 x weekly - 3 sets - 10 reps  - Seated Upper Trapezius Stretch  - 1 x daily - 7 x weekly - 3 sets - 10 reps  - Median Nerve Glide  - 1 x daily - 7 x weekly - 3 sets - 10 reps

## 2024-10-24 ENCOUNTER — OFFICE VISIT (OUTPATIENT)
Dept: OBGYN CLINIC | Facility: CLINIC | Age: 54
End: 2024-10-24
Payer: COMMERCIAL

## 2024-10-24 ENCOUNTER — OFFICE VISIT (OUTPATIENT)
Dept: PHYSICAL THERAPY | Facility: CLINIC | Age: 54
End: 2024-10-24
Payer: COMMERCIAL

## 2024-10-24 ENCOUNTER — APPOINTMENT (OUTPATIENT)
Dept: RADIOLOGY | Facility: CLINIC | Age: 54
End: 2024-10-24
Payer: COMMERCIAL

## 2024-10-24 VITALS
SYSTOLIC BLOOD PRESSURE: 132 MMHG | WEIGHT: 199 LBS | HEART RATE: 80 BPM | DIASTOLIC BLOOD PRESSURE: 71 MMHG | BODY MASS INDEX: 27.86 KG/M2 | HEIGHT: 71 IN

## 2024-10-24 DIAGNOSIS — M75.01 ADHESIVE CAPSULITIS OF RIGHT SHOULDER: ICD-10-CM

## 2024-10-24 DIAGNOSIS — M75.01 ADHESIVE CAPSULITIS OF RIGHT SHOULDER: Primary | ICD-10-CM

## 2024-10-24 DIAGNOSIS — M54.2 NECK PAIN: ICD-10-CM

## 2024-10-24 DIAGNOSIS — M54.2 NECK PAIN: Primary | ICD-10-CM

## 2024-10-24 DIAGNOSIS — M24.811 INTERNAL DERANGEMENT OF RIGHT SHOULDER: ICD-10-CM

## 2024-10-24 PROCEDURE — 97110 THERAPEUTIC EXERCISES: CPT | Performed by: PHYSICAL THERAPIST

## 2024-10-24 PROCEDURE — 72040 X-RAY EXAM NECK SPINE 2-3 VW: CPT

## 2024-10-24 PROCEDURE — 99213 OFFICE O/P EST LOW 20 MIN: CPT | Performed by: ORTHOPAEDIC SURGERY

## 2024-10-24 NOTE — PROGRESS NOTES
Daily Note     Today's date: 10/24/2024  Patient name: Tony Ramsay  : 1970  MRN: [id number]  Referring provider: Sergio Aviles DO  Dx:   Encounter Diagnosis     ICD-10-CM    1. Adhesive capsulitis of right shoulder  M75.01                      Subjective:  He had a visit with Dr. Aviles.  He is scheduled for an MRI.      Objective: See treatment diary below      Assessment: Tolerated treatment well. Patient would benefit from continued PT .  ER and flex remains limited as well as horizontal abduction.  He does improve PROM in these directions with repetition and following MFR.       Plan: Continue per plan of care.          Eval/ Re-eval Auth #/ Referral # Total visits Start date  Expiration date Total active units  Total manual units  PT only or  PT+OT?   24  90  24           Total units authorized                Total units remaining                    Precautions: DM        Specialty Daily Treatment Diary       Manuals 10/7/24 10/14/24 10/17/24 10/22/24 10/24/24   Visit # 7 8 9 10 11   STM UT, pec group   4 min UT and levator 4 min 4 min   PROM shld X10 mins CP   6 min 4 min 4 min   ST joint mobs X2-3 mins CP   2 min upper T-S P to A mobs 2 min upper T-S P to A mobs 2 min    X3-4 mins     1st rib mobs on R 2x45 sec     Central gliders checked DN - see above  2 min MFR pec minor 2 min pec minor   Warm-up        NuStep        Neuro Re-Ed X20 total   C-S retraction  20 20 C-S retract 20   Posture correct x20  20 20 --   Scap squeeze x20  20 20 20   Supine pec stretch     5x                   Ther Ex        TB row Attempted, deferred   20   GTB 20   GTB 20   GTB   TB ext   20   GTB 20   GTB 20   GTB   Ball rolls        Pulleys rev  20 20 20   AROM ER        Open books   Supine pec stretch  10x Open books  10x ea side Open books  10x ea side   Wand flex 3x10  20 20 20   Wand ext Back buddy x 2-3 mins       Wand abd Rev of anatomy and POC  progressions x  5 mins               Ther Activity  10' pat ed on DN                              Modalities                           Access Code: [email or web address]  URL: [email or web address]  Date: 10/02/2024  Prepared by: Nancy Collier    Exercises  - Median Nerve Tensioner  - 1 x daily - 7 x weekly - 3 sets - 10 reps  - Seated Upper Trapezius Stretch  - 1 x daily - 7 x weekly - 3 sets - 10 reps  - Median Nerve Glide  - 1 x daily - 7 x weekly - 3 sets - 10 reps

## 2024-10-24 NOTE — PROGRESS NOTES
Assessment/Plan:  1. Neck pain  XR spine cervical 2 or 3 vw injury      2. Adhesive capsulitis of right shoulder  MRI shoulder right wo contrast      3. Internal derangement of right shoulder  MRI shoulder right wo contrast          Tony has continued right shoulder pain and limited range of motion on exam consistent with adhesive capsulitis.  His neck x-ray today shows no clear abnormality and I have little concern for cervical radiculopathy.  His trapezius pain is secondary to his trouble with lifting and reliance on his scapular motion.  I recommended MRI of the right shoulder for further evaluation to assess for potential underlying rotator cuff tear and confirmation of that he is capsulitis.  Considerations  in the future could include manipulation under anesthesia if clinically indicated.  Follow-up after MRI.      Subjective:   Tony Ramsay is a 54 y.o. male who presents to the office for follow-up for right-sided shoulder pain and adhesive capsulitis.  He has undergone ultrasound-guided glenohumeral injection followed by formal physical therapy for over 6 weeks.  He states his shoulder feels slightly better following the injection but therapy seems to have somewhat stalled.  He feels like therapy was initially helping but his range of motion still feels reduced.  Therapy is frustrated and thinks that the pain may be coming more from his neck.  He denies any neck discomfort other than some rating pain to the shoulder blade and pain with lifting.  He occasionally feels numbness into the hand but this is nonspecific.      Review of Systems   Constitutional:  Negative for chills, fever and unexpected weight change.   HENT:  Negative for hearing loss, nosebleeds and sore throat.    Eyes:  Negative for pain, redness and visual disturbance.   Respiratory:  Negative for cough, shortness of breath and wheezing.    Cardiovascular:  Negative for chest pain, palpitations and leg swelling.   Gastrointestinal:   Negative for abdominal pain, nausea and vomiting.   Endocrine: Negative for polyphagia and polyuria.   Genitourinary:  Negative for dysuria and hematuria.   Musculoskeletal:         See HPI   Skin:  Negative for rash and wound.   Neurological:  Negative for dizziness, numbness and headaches.   Psychiatric/Behavioral:  Negative for decreased concentration and suicidal ideas. The patient is not nervous/anxious.          Past Medical History:   Diagnosis Date    Diabetes mellitus (HCC)     Gout     High cholesterol     Tendonitis        History reviewed. No pertinent surgical history.    Family History   Family history unknown: Yes       Social History     Occupational History    Not on file   Tobacco Use    Smoking status: Never    Smokeless tobacco: Never   Vaping Use    Vaping status: Never Used   Substance and Sexual Activity    Alcohol use: No    Drug use: No    Sexual activity: Not on file         Current Outpatient Medications:     cholecalciferol (VITAMIN D3) 400 units tablet, Take 400 Units by mouth daily (Patient not taking: Reported on 9/12/2024), Disp: , Rfl:     colchicine (COLCRYS) 0.6 mg tablet, Take 1 tablet (0.6 mg total) by mouth daily for 4 days, Disp: 4 tablet, Rfl: 0    colesevelam (WELCHOL) 625 mg tablet, , Disp: , Rfl:     Continuous Glucose Sensor (FreeStyle Christian 2 Sensor) MISC, , Disp: , Rfl:     cyclobenzaprine (FLEXERIL) 10 mg tablet, , Disp: , Rfl:     diclofenac (VOLTAREN) 75 mg EC tablet, Take 1 tablet (75 mg total) by mouth 2 (two) times a day, Disp: 60 tablet, Rfl: 0    diclofenac sodium (VOLTAREN) 1 %, Apply 2 g topically 4 (four) times a day, Disp: 240 g, Rfl: 0    Empagliflozin-metFORMIN HCl (Synjardy) 5-1000 MG TABS, , Disp: , Rfl:     ezetimibe (ZETIA) 10 mg tablet, , Disp: , Rfl:     Icosapent Ethyl (Vascepa) 1 g CAPS, , Disp: , Rfl:     omega-3-acid ethyl esters (LOVAZA) 1 g capsule, , Disp: , Rfl:     Synjardy XR 5-1000 MG TB24, , Disp: , Rfl:     No Known  Allergies    Objective:  Vitals:    10/24/24 1015   BP: 132/71   Pulse: 80            Right Shoulder Exam     Tenderness   Right shoulder tenderness location: Tenderness over anterior and posterior glenohumeral joint and diffuse shoulder discomfort.    Range of Motion   Active abduction:  110 abnormal   Passive abduction:  110 abnormal   Forward flexion:  90 abnormal     Muscle Strength   Abduction: 4/5   Internal rotation: 5/5   External rotation: 5/5   Supraspinatus: 4/5   Subscapularis: 5/5     Tests   Sams test: positive  Impingement: positive  Drop arm: negative    Other   Erythema: absent  Sensation: normal  Pulse: present            Physical Exam  Vitals reviewed.   Constitutional:       Appearance: He is well-developed.   HENT:      Head: Normocephalic and atraumatic.   Eyes:      Conjunctiva/sclera: Conjunctivae normal.      Pupils: Pupils are equal, round, and reactive to light.   Neck:      Comments: Negative Spurling Maneuver bilaterally    Full strength and sensation bilateral extremities.  Cardiovascular:      Rate and Rhythm: Normal rate.      Pulses: Normal pulses.   Pulmonary:      Effort: Pulmonary effort is normal. No respiratory distress.   Musculoskeletal:      Cervical back: Normal range of motion and neck supple. Muscular tenderness present. No spinous process tenderness. Normal range of motion.      Comments: As noted in HPI   Skin:     General: Skin is warm and dry.   Neurological:      General: No focal deficit present.      Mental Status: He is alert and oriented to person, place, and time.   Psychiatric:         Mood and Affect: Mood normal.         Behavior: Behavior normal.         I have personally reviewed pertinent films in PACS and my interpretation is as follows:  Cervical x-ray demonstrates no acute abnormality.      This document was created using speech voice recognition software.   Grammatical errors, random word insertions, pronoun errors, and incomplete sentences are an  occasional consequence of this system due to software limitations, ambient noise, and hardware issues.   Any formal questions or concerns about content, text, or information contained within the body of this dictation should be directly addressed to the provider for clarification.

## 2024-10-29 ENCOUNTER — OFFICE VISIT (OUTPATIENT)
Dept: PHYSICAL THERAPY | Facility: CLINIC | Age: 54
End: 2024-10-29
Payer: COMMERCIAL

## 2024-10-29 DIAGNOSIS — M75.01 ADHESIVE CAPSULITIS OF RIGHT SHOULDER: Primary | ICD-10-CM

## 2024-10-29 PROCEDURE — 97110 THERAPEUTIC EXERCISES: CPT | Performed by: PHYSICAL THERAPIST

## 2024-10-29 PROCEDURE — 97112 NEUROMUSCULAR REEDUCATION: CPT | Performed by: PHYSICAL THERAPIST

## 2024-10-29 NOTE — PROGRESS NOTES
Daily Note     Today's date: 10/29/2024  Patient name: Tony Ramsay  : 1970  MRN: [id number]  Referring provider: Sergio Aviles DO  Dx:   Encounter Diagnosis     ICD-10-CM    1. Adhesive capsulitis of right shoulder  M75.01                      Subjective:  MRI next week on .  He feels improvement in pain level recently.        Objective: See treatment diary below      Assessment: Tolerated treatment well. Patient would benefit from continued PT .   Restricted left shoulder ROM remains.  Upper T-S segment mobility is poor.  Added T-S ext over chair back        Plan: Continue per plan of care.          Eval/ Re-eval Auth #/ Referral # Total visits Start date  Expiration date Total active units  Total manual units  PT only or  PT+OT?   24  90  24           Total units authorized                Total units remaining                    Precautions: DM        Specialty Daily Treatment Diary       Manuals 10/14/24 10/17/24 10/22/24 10/24/24 10/29/24   Visit # 8 9 10 11 12   STM UT, pec group  4 min UT and levator 4 min 4 min 4 min   PROM shld  6 min 4 min 4 min 4 min   ST joint mobs  2 min upper T-S P to A mobs 2 min upper T-S P to A mobs 2 min 2 min upper T-S    DN - see above  2 min MFR pec minor 2 min pec minor 2 min pec minor   Warm-up        NuStep        Neuro Re-Ed  C-S retraction  20 20 C-S retract 20 20   Posture correct  20 20 --    Scap squeeze  20 20 20    Supine pec stretch    5x 5x   T-S ext over chair                Ther Ex        TB row  20   GTB 20   GTB 20   GTB 20   GTB   TB ext  20   GTB 20   GTB 20   GTB 20   GTB   Ball rolls        Pulleys  20 20 20 20   AROM ER        Open books  Supine pec stretch  10x Open books  10x ea side Open books  10x ea side Open books  10x ea side   Wand flex  20 20 20 20   Wand ext        Wand abd     20           Ther Activity 10' pat ed on DN                               Modalities                            Access Code: [email or web address]  URL: [email or web address]  Date: 10/02/2024  Prepared by: Nancy Collier    Exercises  - Median Nerve Tensioner  - 1 x daily - 7 x weekly - 3 sets - 10 reps  - Seated Upper Trapezius Stretch  - 1 x daily - 7 x weekly - 3 sets - 10 reps  - Median Nerve Glide  - 1 x daily - 7 x weekly - 3 sets - 10 reps

## 2024-10-31 ENCOUNTER — OFFICE VISIT (OUTPATIENT)
Dept: PHYSICAL THERAPY | Facility: CLINIC | Age: 54
End: 2024-10-31
Payer: COMMERCIAL

## 2024-10-31 DIAGNOSIS — M75.01 ADHESIVE CAPSULITIS OF RIGHT SHOULDER: Primary | ICD-10-CM

## 2024-10-31 PROCEDURE — 97112 NEUROMUSCULAR REEDUCATION: CPT | Performed by: PHYSICAL THERAPIST

## 2024-10-31 PROCEDURE — 97110 THERAPEUTIC EXERCISES: CPT | Performed by: PHYSICAL THERAPIST

## 2024-10-31 NOTE — PROGRESS NOTES
Daily Note     Today's date: 10/31/2024  Patient name: Tony Ramsay  : 1970  MRN: [id number]  Referring provider: Sergio Aviles DO  Dx:   Encounter Diagnosis     ICD-10-CM    1. Adhesive capsulitis of right shoulder  M75.01                      Subjective:  MRI next week on . Right shld pain continues with lateral motions      Objective: See treatment diary below      Assessment: Tolerated treatment well. Patient would benefit from continued PT .  ER and flexion ROM remain restricted and painful.  Somr improvement in PROM following pec release and stretching      Plan: Continue per plan of care.          Eval/ Re-eval Auth #/ Referral # Total visits Start date  Expiration date Total active units  Total manual units  PT only or  PT+OT?   24  90  24           Total units authorized                Total units remaining                    Precautions: DM        Specialty Daily Treatment Diary       Manuals 10/17/24 10/22/24 10/24/24 10/29/24 10/31/24   Visit # 9 10 11 12 13   STM UT, pec group 4 min UT and levator 4 min 4 min 4 min 4 min   PROM shld 6 min 4 min 4 min 4 min 4 min   ST joint mobs 2 min upper T-S P to A mobs 2 min upper T-S P to A mobs 2 min 2 min upper T-S 2 min upper T-S     2 min MFR pec minor 2 min pec minor 2 min pec minor 2 min pec minor   Warm-up        NuStep        Neuro Re-Ed C-S retraction  20 20 C-S retract 20 20    Posture correct 20 20 --     Scap squeeze 20 20 20     Supine pec stretch   5x 5x 5x   T-S ext over chair     C-S retract / ext  20           Ther Ex        TB row 20   GTB 20   GTB 20   GTB 20   GTB 20   GTB   TB ext 20   GTB 20   GTB 20   GTB 20   GTB 20   GTB   Ball rolls        Pulleys 20 20 20 20 20   AROM ER        Open books Supine pec stretch  10x Open books  10x ea side Open books  10x ea side Open books  10x ea side Open books  10x ea side   Wand flex 20 20 20 20 20   Wand ext     20   Wand abd     20 20           Ther Activity                                Modalities                           Access Code: [email or web address]  URL: [email or web address]  Date: 10/02/2024  Prepared by: Nancy Collier    Exercises  - Median Nerve Tensioner  - 1 x daily - 7 x weekly - 3 sets - 10 reps  - Seated Upper Trapezius Stretch  - 1 x daily - 7 x weekly - 3 sets - 10 reps  - Median Nerve Glide  - 1 x daily - 7 x weekly - 3 sets - 10 reps

## 2024-11-06 ENCOUNTER — OFFICE VISIT (OUTPATIENT)
Dept: PHYSICAL THERAPY | Facility: CLINIC | Age: 54
End: 2024-11-06
Payer: COMMERCIAL

## 2024-11-06 DIAGNOSIS — M75.01 ADHESIVE CAPSULITIS OF RIGHT SHOULDER: Primary | ICD-10-CM

## 2024-11-06 PROCEDURE — 97110 THERAPEUTIC EXERCISES: CPT | Performed by: PHYSICAL THERAPIST

## 2024-11-06 PROCEDURE — 97112 NEUROMUSCULAR REEDUCATION: CPT | Performed by: PHYSICAL THERAPIST

## 2024-11-06 NOTE — PROGRESS NOTES
Daily Note     Today's date: 2024  Patient name: Tony Ramsay  : 1970  MRN: [id number]  Referring provider: Sergio Aviles DO  Dx:   Encounter Diagnosis     ICD-10-CM    1. Adhesive capsulitis of right shoulder  M75.01                      Subjective:  MRI tomorrow.       Objective: See treatment diary below      Assessment: Tolerated treatment well. Patient would benefit from continued PT . Flexion motion making gains but lateral motions including ER and abduction arm=e limited from pain. C-S motions  including retraction and extension do not improve this.      Plan: Continue per plan of care.          Eval/ Re-eval Auth #/ Referral # Total visits Start date  Expiration date Total active units  Total manual units  PT only or  PT+OT?   24  90  24           Total units authorized                Total units remaining                    Precautions: DM        Specialty Daily Treatment Diary       Manuals 10/22/24 10/24/24 10/29/24 10/31/24 11/6/24   Visit # 10 11 12 13 14   STM UT, pec group 4 min 4 min 4 min 4 min 3 min   PROM shld 4 min 4 min 4 min 4 min 4 min   ST joint mobs 2 min upper T-S P to A mobs 2 min 2 min upper T-S 2 min upper T-S 2 min    2 min MFR pec minor 2 min pec minor 2 min pec minor 2 min pec minor 2 min pec minor   Warm-up        NuStep        Neuro Re-Ed 20 C-S retract 20 20  20 C-S retract   Posture correct 20 --      Scap squeeze 20 20   S/L rotation 5x   Supine pec stretch  5x 5x 5x 5x   T-S ext over chair    C-S retract / ext  20 C-S retract / ext  20           Ther Ex        TB row 20   GTB 20   GTB 20   GTB 20   GTB 20    GTB   TB ext 20   GTB 20   GTB 20   GTB 20   GTB 20   GTB   Ball rolls        Pulleys 20 20 20 20 20   AROM ER        Open books Open books  10x ea side Open books  10x ea side Open books  10x ea side Open books  10x ea side Open books  10x ea side   Wand flex 20 20 20 20 20   Wand ext    20 20    Lyle pavon   20 20            Ther Activity                                Modalities                           Access Code: [email or web address]  URL: [email or web address]  Date: 10/02/2024  Prepared by: Nancy Collier    Exercises  - Median Nerve Tensioner  - 1 x daily - 7 x weekly - 3 sets - 10 reps  - Seated Upper Trapezius Stretch  - 1 x daily - 7 x weekly - 3 sets - 10 reps  - Median Nerve Glide  - 1 x daily - 7 x weekly - 3 sets - 10 reps

## 2024-11-07 ENCOUNTER — HOSPITAL ENCOUNTER (OUTPATIENT)
Dept: RADIOLOGY | Facility: HOSPITAL | Age: 54
Discharge: HOME | End: 2024-11-07
Attending: ORTHOPAEDIC SURGERY
Payer: COMMERCIAL

## 2024-11-07 DIAGNOSIS — M75.01 ADHESIVE CAPSULITIS OF RIGHT SHOULDER: ICD-10-CM

## 2024-11-07 DIAGNOSIS — M24.811 INTERNAL DERANGEMENT OF RIGHT SHOULDER: ICD-10-CM

## 2024-11-07 PROCEDURE — 73221 MRI JOINT UPR EXTREM W/O DYE: CPT

## 2024-11-12 ENCOUNTER — APPOINTMENT (OUTPATIENT)
Dept: PHYSICAL THERAPY | Facility: CLINIC | Age: 54
End: 2024-11-12
Payer: COMMERCIAL

## 2024-11-14 ENCOUNTER — OFFICE VISIT (OUTPATIENT)
Dept: OBGYN CLINIC | Facility: CLINIC | Age: 54
End: 2024-11-14
Payer: COMMERCIAL

## 2024-11-14 VITALS
DIASTOLIC BLOOD PRESSURE: 74 MMHG | HEIGHT: 71 IN | WEIGHT: 199 LBS | SYSTOLIC BLOOD PRESSURE: 137 MMHG | BODY MASS INDEX: 27.86 KG/M2 | HEART RATE: 98 BPM

## 2024-11-14 DIAGNOSIS — M75.111 INCOMPLETE TEAR OF RIGHT ROTATOR CUFF, UNSPECIFIED WHETHER TRAUMATIC: ICD-10-CM

## 2024-11-14 DIAGNOSIS — M75.01 ADHESIVE CAPSULITIS OF RIGHT SHOULDER: Primary | ICD-10-CM

## 2024-11-14 PROCEDURE — 99214 OFFICE O/P EST MOD 30 MIN: CPT | Performed by: ORTHOPAEDIC SURGERY

## 2024-11-14 RX ORDER — DICLOFENAC SODIUM 75 MG/1
75 TABLET, DELAYED RELEASE ORAL 2 TIMES DAILY
Qty: 60 TABLET | Refills: 0 | Status: SHIPPED | OUTPATIENT
Start: 2024-11-14

## 2024-11-14 NOTE — PROGRESS NOTES
Assessment/Plan:  1. Adhesive capsulitis of right shoulder  Ambulatory Referral to Orthopedic Surgery    diclofenac (VOLTAREN) 75 mg EC tablet      2. Incomplete tear of right rotator cuff, unspecified whether traumatic  Ambulatory Referral to Orthopedic Surgery        Scribe Attestation      I,:  Ramon Da Silva MA am acting as a scribe while in the presence of the attending physician.:       I,:  Sergio Aviles, DO personally performed the services described in this documentation    as scribed in my presence.:               Tony continues to struggle with right shoulder pain and stiffness.  This has been ongoing now since July.  Recent MRI revealed a small articular sided tear of the supraspinatus tendon with evidence of adhesive capsulitis.  He has failed to find improvement with physical therapy and a steroid injection.  I would like to refer Tony to my colleague Dr. Dos Santos for his evaluation and assessment.  He may consider a right shoulder arthroscopy with lysis of adhesions and manipulation under under sedation.  Should a larger tear be found during arthroscopy he may consider rotator cuff repair.  Tony was in agreement with this.  I did provide him a prescription for diclofenac to help manage his pain.    Subjective:   Tony Ramsay is a 54 y.o. diabetic male who presents for follow-up evaluation of his right shoulder.  At last visit an MRI was ordered as he had failed to find improvement with physical therapy and a glenohumeral joint injection.  He is been formally diagnosed with adhesive capsulitis.  Tony returns today stating that his shoulder pain persists.  He states is quite bad today.  He describes an intermittent sharp stabbing pain in the anterior aspect of the shoulder that can radiate laterally with movement.  He states this is affecting his daily living.  He is unable to perform tasks at home secondary to his shoulder pain.  He does not feel he is improving with physical therapy.        Review  of Systems   Constitutional:  Negative for chills, fever and unexpected weight change.   HENT:  Negative for hearing loss, nosebleeds and sore throat.    Eyes:  Negative for pain, redness and visual disturbance.   Respiratory:  Negative for cough, shortness of breath and wheezing.    Cardiovascular:  Negative for chest pain, palpitations and leg swelling.   Gastrointestinal:  Negative for abdominal pain, nausea and vomiting.   Endocrine: Negative for polyphagia and polyuria.   Genitourinary:  Negative for dysuria and hematuria.   Musculoskeletal:         See HPI   Skin:  Negative for rash and wound.   Neurological:  Negative for dizziness, numbness and headaches.   Psychiatric/Behavioral:  Negative for decreased concentration and suicidal ideas. The patient is not nervous/anxious.          Past Medical History:   Diagnosis Date    Diabetes mellitus (HCC)     Gout     High cholesterol     Tendonitis        History reviewed. No pertinent surgical history.    Family History   Family history unknown: Yes       Social History     Occupational History    Not on file   Tobacco Use    Smoking status: Never    Smokeless tobacco: Never   Vaping Use    Vaping status: Never Used   Substance and Sexual Activity    Alcohol use: No    Drug use: No    Sexual activity: Not on file         Current Outpatient Medications:     cholecalciferol (VITAMIN D3) 400 units tablet, Take 400 Units by mouth daily (Patient not taking: Reported on 9/12/2024), Disp: , Rfl:     colchicine (COLCRYS) 0.6 mg tablet, Take 1 tablet (0.6 mg total) by mouth daily for 4 days, Disp: 4 tablet, Rfl: 0    colesevelam (WELCHOL) 625 mg tablet, , Disp: , Rfl:     Continuous Glucose Sensor (FreeStyle Christian 2 Sensor) MISC, , Disp: , Rfl:     cyclobenzaprine (FLEXERIL) 10 mg tablet, , Disp: , Rfl:     diclofenac (VOLTAREN) 75 mg EC tablet, Take 1 tablet (75 mg total) by mouth 2 (two) times a day, Disp: 60 tablet, Rfl: 0    diclofenac sodium (VOLTAREN) 1 %, Apply 2 g  topically 4 (four) times a day, Disp: 240 g, Rfl: 0    Empagliflozin-metFORMIN HCl (Synjardy) 5-1000 MG TABS, , Disp: , Rfl:     ezetimibe (ZETIA) 10 mg tablet, , Disp: , Rfl:     Icosapent Ethyl (Vascepa) 1 g CAPS, , Disp: , Rfl:     omega-3-acid ethyl esters (LOVAZA) 1 g capsule, , Disp: , Rfl:     Synjardy XR 5-1000 MG TB24, , Disp: , Rfl:     No Known Allergies    Objective:  Vitals:    11/14/24 1107   BP: 137/74   Pulse: 98       Right Shoulder Exam     Range of Motion   Active abduction:  160   Right shoulder external rotation: 15.   Forward flexion:  150   Right shoulder internal rotation 0 degrees: Right Hip.     Muscle Strength   Abduction: 5/5   Internal rotation: 5/5   External rotation: 5/5   Supraspinatus: 5/5   Subscapularis: 5/5   Biceps: 5/5     Other   Sensation: normal  Pulse: present (2+ radial)            Physical Exam  Vitals reviewed.   Constitutional:       Appearance: He is well-developed.   HENT:      Head: Normocephalic and atraumatic.   Eyes:      General:         Right eye: No discharge.         Left eye: No discharge.      Conjunctiva/sclera: Conjunctivae normal.   Cardiovascular:      Rate and Rhythm: Regular rhythm.   Pulmonary:      Effort: Pulmonary effort is normal. No respiratory distress.   Musculoskeletal:      Cervical back: Normal range of motion and neck supple.   Skin:     General: Skin is warm and dry.   Neurological:      Mental Status: He is alert and oriented to person, place, and time.   Psychiatric:         Behavior: Behavior normal.         I have personally reviewed pertinent films in PACS and my interpretation is as follows:  MRI of the right shoulder shows evidence of mild supraspinatus tendinosis with a partial-thickness articular sided tear anteriorly.  There is also evidence of adhesive capsulitis with thickening of the inferior joint capsule.      This document was created using speech voice recognition software.   Grammatical errors, random word insertions,  pronoun errors, and incomplete sentences are an occasional consequence of this system due to software limitations, ambient noise, and hardware issues.   Any formal questions or concerns about content, text, or information contained within the body of this dictation should be directly addressed to the provider for clarification.

## 2024-11-19 ENCOUNTER — CONSULT (OUTPATIENT)
Dept: OBGYN CLINIC | Facility: CLINIC | Age: 54
End: 2024-11-19
Payer: COMMERCIAL

## 2024-11-19 ENCOUNTER — PREP FOR PROCEDURE (OUTPATIENT)
Dept: OBGYN CLINIC | Facility: CLINIC | Age: 54
End: 2024-11-19

## 2024-11-19 ENCOUNTER — TELEPHONE (OUTPATIENT)
Dept: OBGYN CLINIC | Facility: CLINIC | Age: 54
End: 2024-11-19

## 2024-11-19 ENCOUNTER — TELEPHONE (OUTPATIENT)
Age: 54
End: 2024-11-19

## 2024-11-19 VITALS
SYSTOLIC BLOOD PRESSURE: 149 MMHG | HEART RATE: 80 BPM | DIASTOLIC BLOOD PRESSURE: 80 MMHG | BODY MASS INDEX: 27.86 KG/M2 | WEIGHT: 199 LBS | HEIGHT: 71 IN

## 2024-11-19 DIAGNOSIS — M75.01 ADHESIVE CAPSULITIS OF RIGHT SHOULDER: ICD-10-CM

## 2024-11-19 DIAGNOSIS — M75.111 INCOMPLETE TEAR OF RIGHT ROTATOR CUFF, UNSPECIFIED WHETHER TRAUMATIC: ICD-10-CM

## 2024-11-19 PROCEDURE — 99204 OFFICE O/P NEW MOD 45 MIN: CPT | Performed by: ORTHOPAEDIC SURGERY

## 2024-11-19 RX ORDER — CHLORHEXIDINE GLUCONATE ORAL RINSE 1.2 MG/ML
15 SOLUTION DENTAL ONCE
OUTPATIENT
Start: 2024-11-19 | End: 2024-11-19

## 2024-11-19 NOTE — TELEPHONE ENCOUNTER
Caller: patient     Doctor: Dominic     Reason for call: patient asking max A1C for sx to be done?    Call back#: [phone]

## 2024-11-19 NOTE — TELEPHONE ENCOUNTER
lm to reschedule appt due to provider being pulled into OR in afternoon.  At time of message there were still openings in the morning.  Otherwise we will have to look at a day next week

## 2024-11-19 NOTE — PROGRESS NOTES
Assessment/Plan:  1. Adhesive capsulitis of right shoulder  Ambulatory Referral to Orthopedic Surgery      2. Incomplete tear of right rotator cuff, unspecified whether traumatic  Ambulatory Referral to Orthopedic Surgery        Scribe Attestation      I,:  Clare Watson am acting as a scribe while in the presence of the attending physician.:       I,:  Brayden Dos Santos MD personally performed the services described in this documentation    as scribed in my presence.:           Tony is a pleasant 54 y.o. male referred to my office for evaluation of right shoulder. MRI demonstrates a partial thickness supraspinatus tear and adhesive capsulitis. Patient has tried and failed non operative treatment including but not limited to physical therapy, glenohumeral corticosteroid injection, OTC analgesics, and activity modification. At this time a right shoulder arthroscopy SAMUEL with lysis of adhesions and manipulation under anesthesia is an option.. Patient was amenable to this. Risks and benefits of surgery were discussed, pre and post operative expectations reviewed.  After full discussion he would like to pursue operative intervention.  Consent was signed.  He will meet with my surgery scheduler today. We will see him back in clinic post operatively.  We did discuss risk of recurrent adhesive capsulitis after lysis of adhesions.  He will plan to start physical therapy the day after surgery and then every day for 14 days and space it out after that.      Given that the patient has failed conservative treatment and continues to have severe pain and/or dysfunction that limits their activities of daily living, they would like to proceed with operative intervention.  We discussed risks, benefits and alternatives to surgery today in the clinic. We discussed with the patient the risks of no treatment, non-operative treatment, and operative treatment. The risks of operative intervention were discussed and include but are not  limited to: Infection, bleeding, stiffness, loss of range of motion, blood clot, failure of surgery, fracture, delayed union, nonunion, malunion, risk of potential future arthritis, continued problems with swelling, injury to surrounding structures/nerve/artery/vein, recurrence of cysts, failure of hardware, retained hardware and/or foreign body, symptomatic hardware, and continued instability, pain, dysfunction, or disability despite repair.               Subjective:   Tony Ramsay is a 54 y.o. male who presents for evaluation of right shoulder, he is a referral from Dr Aviles. Patients pain started in May after a possible injury. He describes an intermittent sharp stabbing pain in the anterior aspect of the shoulder that can radiate laterally with movement. He states this is affecting his daily living. He is unable to perform tasks at home secondary to his shoulder pain. He does not feel he is improving with physical therapy.     Review of Systems   Constitutional:  Negative for chills and fever.   HENT:  Negative for ear pain and sore throat.    Eyes:  Negative for pain and visual disturbance.   Respiratory:  Negative for cough and shortness of breath.    Cardiovascular:  Negative for chest pain and palpitations.   Gastrointestinal:  Negative for abdominal pain and vomiting.   Genitourinary:  Negative for dysuria and hematuria.   Musculoskeletal:  Negative for arthralgias and back pain.   Skin:  Negative for color change and rash.   Neurological:  Negative for seizures and syncope.   All other systems reviewed and are negative.        Past Medical History:   Diagnosis Date    Diabetes mellitus (HCC)     Gout     High cholesterol     Tendonitis        No past surgical history on file.    Family History   Family history unknown: Yes       Social History     Occupational History    Not on file   Tobacco Use    Smoking status: Never    Smokeless tobacco: Never   Vaping Use    Vaping status: Never Used   Substance  and Sexual Activity    Alcohol use: No    Drug use: No    Sexual activity: Not on file         Current Outpatient Medications:     Continuous Glucose Sensor (FreeStyle Christian 2 Sensor) MISC, , Disp: , Rfl:     diclofenac (VOLTAREN) 75 mg EC tablet, Take 1 tablet (75 mg total) by mouth 2 (two) times a day, Disp: 60 tablet, Rfl: 0    Empagliflozin-metFORMIN HCl (Synjardy) 5-1000 MG TABS, , Disp: , Rfl:     ezetimibe (ZETIA) 10 mg tablet, , Disp: , Rfl:     omega-3-acid ethyl esters (LOVAZA) 1 g capsule, , Disp: , Rfl:     cholecalciferol (VITAMIN D3) 400 units tablet, Take 400 Units by mouth daily (Patient not taking: Reported on 11/19/2024), Disp: , Rfl:     colchicine (COLCRYS) 0.6 mg tablet, Take 1 tablet (0.6 mg total) by mouth daily for 4 days, Disp: 4 tablet, Rfl: 0    colesevelam (WELCHOL) 625 mg tablet, , Disp: , Rfl:     cyclobenzaprine (FLEXERIL) 10 mg tablet, , Disp: , Rfl:     diclofenac sodium (VOLTAREN) 1 %, Apply 2 g topically 4 (four) times a day, Disp: 240 g, Rfl: 0    Icosapent Ethyl (Vascepa) 1 g CAPS, , Disp: , Rfl:     Synjardy XR 5-1000 MG TB24, , Disp: , Rfl:     No Known Allergies    Objective:  Vitals:    11/19/24 1318   BP: 149/80   Pulse: 80       Ortho Exam    Physical Exam  Vitals and nursing note reviewed.   Constitutional:       Appearance: Normal appearance.   HENT:      Head: Normocephalic and atraumatic.      Right Ear: External ear normal.      Left Ear: External ear normal.   Eyes:      Extraocular Movements: Extraocular movements intact.      Conjunctiva/sclera: Conjunctivae normal.   Cardiovascular:      Rate and Rhythm: Normal rate.      Pulses: Normal pulses.   Pulmonary:      Effort: Pulmonary effort is normal.   Musculoskeletal:         General: Normal range of motion.      Cervical back: Normal range of motion and neck supple.      Comments: See ortho exam   Skin:     General: Skin is warm and dry.   Neurological:      General: No focal deficit present.      Mental Status: He  is alert.   Psychiatric:         Behavior: Behavior normal.         I have personally reviewed pertinent films in PACS and my interpretation is as follows:  MRI of right shoulder obtained 11/7/24 demonstrates partial-thickness articular sided supraspinatus tear. Findings of adhesive capsulitis noted.       This document was created using speech voice recognition software.   Grammatical errors, random word insertions, pronoun errors, and incomplete sentences are an occasional consequence of this system due to software limitations, ambient noise, and hardware issues.   Any formal questions or concerns about content, text, or information contained within the body of this dictation should be directly addressed to the provider for clarification.

## 2025-01-06 ENCOUNTER — APPOINTMENT (OUTPATIENT)
Dept: LAB | Facility: HOSPITAL | Age: 55
End: 2025-01-06
Attending: ORTHOPAEDIC SURGERY
Payer: COMMERCIAL

## 2025-01-06 DIAGNOSIS — M75.01 ADHESIVE CAPSULITIS OF RIGHT SHOULDER: ICD-10-CM

## 2025-01-06 LAB
ALBUMIN SERPL BCG-MCNC: 4.6 G/DL (ref 3.5–5)
ALP SERPL-CCNC: 71 U/L (ref 34–104)
ALT SERPL W P-5'-P-CCNC: 17 U/L (ref 7–52)
ANION GAP SERPL CALCULATED.3IONS-SCNC: 0 MMOL/L (ref 4–13)
APTT PPP: 31 SECONDS (ref 23–34)
AST SERPL W P-5'-P-CCNC: 12 U/L (ref 13–39)
BILIRUB SERPL-MCNC: 0.92 MG/DL (ref 0.2–1)
BUN SERPL-MCNC: 19 MG/DL (ref 5–25)
CALCIUM SERPL-MCNC: 9 MG/DL (ref 8.4–10.2)
CHLORIDE SERPL-SCNC: 107 MMOL/L (ref 96–108)
CO2 SERPL-SCNC: 28 MMOL/L (ref 21–32)
CREAT SERPL-MCNC: 1 MG/DL (ref 0.6–1.3)
ERYTHROCYTE [DISTWIDTH] IN BLOOD BY AUTOMATED COUNT: 12 % (ref 11.6–15.1)
EST. AVERAGE GLUCOSE BLD GHB EST-MCNC: 154 MG/DL
GFR SERPL CREATININE-BSD FRML MDRD: 83 ML/MIN/1.73SQ M
GLUCOSE P FAST SERPL-MCNC: 167 MG/DL (ref 65–99)
HBA1C MFR BLD: 7 %
HCT VFR BLD AUTO: 49 % (ref 36.5–49.3)
HGB BLD-MCNC: 17 G/DL (ref 12–17)
INR PPP: 0.93 (ref 0.85–1.19)
MCH RBC QN AUTO: 33.6 PG (ref 26.8–34.3)
MCHC RBC AUTO-ENTMCNC: 34.7 G/DL (ref 31.4–37.4)
MCV RBC AUTO: 97 FL (ref 82–98)
PLATELET # BLD AUTO: 203 THOUSANDS/UL (ref 149–390)
PMV BLD AUTO: 10.3 FL (ref 8.9–12.7)
POTASSIUM SERPL-SCNC: 4.3 MMOL/L (ref 3.5–5.3)
PROT SERPL-MCNC: 7.1 G/DL (ref 6.4–8.4)
PROTHROMBIN TIME: 13 SECONDS (ref 12.3–15)
RBC # BLD AUTO: 5.06 MILLION/UL (ref 3.88–5.62)
SODIUM SERPL-SCNC: 135 MMOL/L (ref 135–147)
WBC # BLD AUTO: 7.22 THOUSAND/UL (ref 4.31–10.16)

## 2025-01-06 PROCEDURE — 85610 PROTHROMBIN TIME: CPT

## 2025-01-06 PROCEDURE — 80053 COMPREHEN METABOLIC PANEL: CPT

## 2025-01-06 PROCEDURE — 85027 COMPLETE CBC AUTOMATED: CPT

## 2025-01-06 PROCEDURE — 83036 HEMOGLOBIN GLYCOSYLATED A1C: CPT

## 2025-01-06 PROCEDURE — 85730 THROMBOPLASTIN TIME PARTIAL: CPT

## 2025-01-06 PROCEDURE — 36415 COLL VENOUS BLD VENIPUNCTURE: CPT

## 2025-01-07 LAB
ATRIAL RATE: 73 BPM
P AXIS: 59 DEGREES
PR INTERVAL: 174 MS
QRS AXIS: 0 DEGREES
QRSD INTERVAL: 94 MS
QT INTERVAL: 372 MS
QTC INTERVAL: 410 MS
T WAVE AXIS: 19 DEGREES
VENTRICULAR RATE: 73 BPM

## 2025-01-07 PROCEDURE — 93010 ELECTROCARDIOGRAM REPORT: CPT | Performed by: INTERNAL MEDICINE

## 2025-01-15 ENCOUNTER — TELEPHONE (OUTPATIENT)
Dept: OBGYN CLINIC | Facility: HOSPITAL | Age: 55
End: 2025-01-15

## 2025-01-15 RX ORDER — VIT C/B6/B5/MAGNESIUM/HERB 173 50-5-6-5MG
CAPSULE ORAL
COMMUNITY

## 2025-01-15 RX ORDER — MULTIVITAMIN WITH IRON
TABLET ORAL DAILY
COMMUNITY

## 2025-01-15 NOTE — TELEPHONE ENCOUNTER
Caller: Tony     Doctor: Levon / [place]    Reason for call: Patient is having shoulder surgery with Dr Dos Santos on 1/23.    Patient is interested in using the cold therapy unit for his shoulder post surgery.  Please place order for unit, advise patient if this could be sent through his insurance and where he can get it.    Please call number below to advise     Call back#: [phone]

## 2025-01-15 NOTE — PRE-PROCEDURE INSTRUCTIONS
Pre-Surgery Instructions:   Medication Instructions    ALPHA LIPOIC ACID PO Stop taking 7 days prior to surgery.    Berberine Chloride (BERBERINE HCI PO) Stop taking 7 days prior to surgery.    cholecalciferol (VITAMIN D3) 400 units tablet Stop taking 7 days prior to surgery.    Continuous Glucose Sensor (FreeStyle Christian 2 Sensor) MISC Hold day of surgery.    diclofenac (VOLTAREN) 75 mg EC tablet Stop taking 7 days prior to surgery.    ezetimibe (ZETIA) 10 mg tablet Take night before surgery    omega-3-acid ethyl esters (LOVAZA) 1 g capsule Stop taking 7 days prior to surgery.    Synjardy XR 5-1000 MG TB24 Stop taking 4 days prior to surgery.    Turmeric 500 MG CAPS Stop taking 7 days prior to surgery.    vitamin B-12 (CYANOCOBALAMIN) 50 MCG tablet Stop taking 7 days prior to surgery.   LD Synjardy 01/18/25. Medication instructions for day surgery reviewed. Please use only a sip of water to take your instructed medications. Avoid all over the counter vitamins, supplements and NSAIDS for one week prior to surgery per anesthesia guidelines. Tylenol is ok to take as needed.     You will receive a call one business day prior to surgery with an arrival time and hospital directions. If your surgery is scheduled on a Monday, the hospital will be calling you on the Friday prior to your surgery. If you have not heard from anyone by 8pm, please call the hospital supervisor through the hospital  at [phone]. ([place] [phone] or [place] [phone]).    Do not eat or drink anything after midnight the night before your surgery, including candy, mints, lifesavers, or chewing gum. Do not drink alcohol 24hrs before your surgery. Try not to smoke at least 24hrs before your surgery.       Follow the pre surgery showering instructions as listed in the “My Surgical Experience Booklet” or otherwise provided by your surgeon's office. Do not use a blade to shave the surgical area 1 week before surgery. It is okay to  use a clean electric clippers up to 24 hours before surgery. Do not apply any lotions, creams, including makeup, cologne, deodorant, or perfumes after showering on the day of your surgery. Do not use dry shampoo, hair spray, hair gel, or any type of hair products.     No contact lenses, eye make-up, or artificial eyelashes. Remove nail polish, including gel polish, and any artificial, gel, or acrylic nails if possible. Remove all jewelry including rings and body piercing jewelry.     Wear causal clothing that is easy to take on and off. Consider your type of surgery.    Keep any valuables, jewelry, piercings at home. Please bring any specially ordered equipment (sling, braces) if indicated.    Arrange for a responsible person to drive you to and from the hospital on the day of your surgery. Please confirm the visitor policy for the day of your procedure when you receive your phone call with an arrival time.     Call the surgeon's office with any new illnesses, exposures, or additional questions prior to surgery.    Please reference your “My Surgical Experience Booklet” for additional information to prepare for your upcoming surgery.

## 2025-01-15 NOTE — TELEPHONE ENCOUNTER
Returned patient call.  All questions regarding ice machine have been answered.  Patient will continue to explore different options.

## 2025-01-22 ENCOUNTER — ANESTHESIA EVENT (OUTPATIENT)
Dept: PERIOP | Facility: HOSPITAL | Age: 55
End: 2025-01-22
Payer: COMMERCIAL

## 2025-01-22 PROBLEM — E78.5 HYPERLIPIDEMIA: Status: ACTIVE | Noted: 2025-01-22

## 2025-01-22 NOTE — ANESTHESIA PREPROCEDURE EVALUATION
Procedure:  ARTHROSCOPY SHOULDER, Lysis of Adhesion, Manipulation under anesthesia (Right: Shoulder)    Relevant Problems   CARDIO   (+) Hyperlipidemia      MUSCULOSKELETAL   (+) Adhesive capsulitis of right shoulder      Endocrine   (+) Diabetes mellitus (HCC)      Orthopedic/Musculoskeletal   (+) Incomplete tear of right rotator cuff, unspecified whether traumatic      On synjardy  Physical Exam    Airway    Mallampati score: II  TM Distance: >3 FB  Neck ROM: full     Dental   No notable dental hx     Cardiovascular  Cardiovascular exam normal    Pulmonary  Pulmonary exam normal     Other Findings        Anesthesia Plan  ASA Score- 2     Anesthesia Type- general with ASA Monitors.         Additional Monitors:     Airway Plan: LMA.           Plan Factors-Exercise tolerance (METS): >4 METS.    Chart reviewed.   Existing labs reviewed. Patient summary reviewed.    Patient is not a current smoker.              Induction- intravenous.    Postoperative Plan- Plan for postoperative opioid use.     Perioperative Resuscitation Plan - Level 1 - Full Code.       Informed Consent- Anesthetic plan and risks discussed with patient.  I personally reviewed this patient with the CRNA. Discussed and agreed on the Anesthesia Plan with the CRNA..      NPO Status:  No vitals data found for the desired time range.

## 2025-01-23 ENCOUNTER — HOSPITAL ENCOUNTER (OUTPATIENT)
Facility: HOSPITAL | Age: 55
Setting detail: OUTPATIENT SURGERY
Discharge: HOME | End: 2025-01-23
Attending: ORTHOPAEDIC SURGERY | Admitting: ORTHOPAEDIC SURGERY
Payer: COMMERCIAL

## 2025-01-23 ENCOUNTER — ANESTHESIA (OUTPATIENT)
Dept: PERIOP | Facility: HOSPITAL | Age: 55
End: 2025-01-23
Payer: COMMERCIAL

## 2025-01-23 VITALS
OXYGEN SATURATION: 95 % | DIASTOLIC BLOOD PRESSURE: 77 MMHG | SYSTOLIC BLOOD PRESSURE: 142 MMHG | HEIGHT: 71 IN | HEART RATE: 95 BPM | TEMPERATURE: 97.6 F | RESPIRATION RATE: 18 BRPM | WEIGHT: 188.05 LBS | BODY MASS INDEX: 26.33 KG/M2

## 2025-01-23 DIAGNOSIS — M75.01 ADHESIVE CAPSULITIS OF RIGHT SHOULDER: Primary | ICD-10-CM

## 2025-01-23 LAB — GLUCOSE SERPL-MCNC: 154 MG/DL (ref 65–140)

## 2025-01-23 PROCEDURE — 82948 REAGENT STRIP/BLOOD GLUCOSE: CPT

## 2025-01-23 PROCEDURE — NC001 PR NO CHARGE: Performed by: ORTHOPAEDIC SURGERY

## 2025-01-23 PROCEDURE — 29823 SHO ARTHRS SRG XTNSV DBRDMT: CPT | Performed by: PHYSICIAN ASSISTANT

## 2025-01-23 PROCEDURE — 29823 SHO ARTHRS SRG XTNSV DBRDMT: CPT | Performed by: ORTHOPAEDIC SURGERY

## 2025-01-23 RX ORDER — EPHEDRINE SULFATE 50 MG/ML
INJECTION INTRAVENOUS AS NEEDED
Status: DISCONTINUED | OUTPATIENT
Start: 2025-01-23 | End: 2025-01-23

## 2025-01-23 RX ORDER — OXYCODONE HYDROCHLORIDE 5 MG/1
5 TABLET ORAL EVERY 4 HOURS PRN
Qty: 20 TABLET | Refills: 0 | Status: SHIPPED | OUTPATIENT
Start: 2025-01-23 | End: 2025-01-23

## 2025-01-23 RX ORDER — LIDOCAINE HYDROCHLORIDE 10 MG/ML
INJECTION, SOLUTION EPIDURAL; INFILTRATION; INTRACAUDAL; PERINEURAL AS NEEDED
Status: DISCONTINUED | OUTPATIENT
Start: 2025-01-23 | End: 2025-01-23

## 2025-01-23 RX ORDER — CEFAZOLIN SODIUM 2 G/50ML
2000 SOLUTION INTRAVENOUS ONCE
Status: COMPLETED | OUTPATIENT
Start: 2025-01-23 | End: 2025-01-23

## 2025-01-23 RX ORDER — ONDANSETRON 2 MG/ML
4 INJECTION INTRAMUSCULAR; INTRAVENOUS ONCE AS NEEDED
Status: DISCONTINUED | OUTPATIENT
Start: 2025-01-23 | End: 2025-01-23 | Stop reason: HOSPADM

## 2025-01-23 RX ORDER — MEPERIDINE HYDROCHLORIDE 25 MG/ML
12.5 INJECTION INTRAMUSCULAR; INTRAVENOUS; SUBCUTANEOUS
Status: DISCONTINUED | OUTPATIENT
Start: 2025-01-23 | End: 2025-01-23 | Stop reason: HOSPADM

## 2025-01-23 RX ORDER — ASPIRIN 81 MG/1
81 TABLET, CHEWABLE ORAL 2 TIMES DAILY
Qty: 56 TABLET | Refills: 0 | Status: SHIPPED | OUTPATIENT
Start: 2025-01-23 | End: 2025-02-20

## 2025-01-23 RX ORDER — MIDAZOLAM HYDROCHLORIDE 2 MG/2ML
INJECTION, SOLUTION INTRAMUSCULAR; INTRAVENOUS AS NEEDED
Status: DISCONTINUED | OUTPATIENT
Start: 2025-01-23 | End: 2025-01-23

## 2025-01-23 RX ORDER — BUPIVACAINE HYDROCHLORIDE 5 MG/ML
INJECTION, SOLUTION EPIDURAL; INTRACAUDAL
Status: COMPLETED | OUTPATIENT
Start: 2025-01-23 | End: 2025-01-23

## 2025-01-23 RX ORDER — ONDANSETRON 4 MG/1
4 TABLET, FILM COATED ORAL EVERY 8 HOURS PRN
Qty: 20 TABLET | Refills: 0 | Status: SHIPPED | OUTPATIENT
Start: 2025-01-23

## 2025-01-23 RX ORDER — ONDANSETRON 2 MG/ML
INJECTION INTRAMUSCULAR; INTRAVENOUS AS NEEDED
Status: DISCONTINUED | OUTPATIENT
Start: 2025-01-23 | End: 2025-01-23

## 2025-01-23 RX ORDER — PROPOFOL 10 MG/ML
INJECTION, EMULSION INTRAVENOUS AS NEEDED
Status: DISCONTINUED | OUTPATIENT
Start: 2025-01-23 | End: 2025-01-23

## 2025-01-23 RX ORDER — SODIUM CHLORIDE, SODIUM LACTATE, POTASSIUM CHLORIDE, CALCIUM CHLORIDE 600; 310; 30; 20 MG/100ML; MG/100ML; MG/100ML; MG/100ML
125 INJECTION, SOLUTION INTRAVENOUS CONTINUOUS
Status: DISCONTINUED | OUTPATIENT
Start: 2025-01-23 | End: 2025-01-23 | Stop reason: HOSPADM

## 2025-01-23 RX ORDER — CHLORHEXIDINE GLUCONATE ORAL RINSE 1.2 MG/ML
15 SOLUTION DENTAL ONCE
Status: COMPLETED | OUTPATIENT
Start: 2025-01-23 | End: 2025-01-23

## 2025-01-23 RX ORDER — TRAMADOL HYDROCHLORIDE 50 MG/1
50 TABLET ORAL EVERY 6 HOURS PRN
Qty: 20 TABLET | Refills: 0 | Status: SHIPPED | OUTPATIENT
Start: 2025-01-23

## 2025-01-23 RX ORDER — HYDROMORPHONE HCL/PF 1 MG/ML
0.5 SYRINGE (ML) INJECTION
Status: DISCONTINUED | OUTPATIENT
Start: 2025-01-23 | End: 2025-01-23 | Stop reason: HOSPADM

## 2025-01-23 RX ORDER — FENTANYL CITRATE/PF 50 MCG/ML
25 SYRINGE (ML) INJECTION
Status: COMPLETED | OUTPATIENT
Start: 2025-01-23 | End: 2025-01-23

## 2025-01-23 RX ORDER — DEXAMETHASONE SODIUM PHOSPHATE 10 MG/ML
INJECTION, SOLUTION INTRAMUSCULAR; INTRAVENOUS AS NEEDED
Status: DISCONTINUED | OUTPATIENT
Start: 2025-01-23 | End: 2025-01-23

## 2025-01-23 RX ORDER — ACETAMINOPHEN 500 MG
1000 TABLET ORAL EVERY 8 HOURS
Qty: 42 TABLET | Refills: 0 | Status: SHIPPED | OUTPATIENT
Start: 2025-01-23 | End: 2025-01-30

## 2025-01-23 RX ADMIN — BUPIVACAINE 20 ML: 13.3 INJECTION, SUSPENSION, LIPOSOMAL INFILTRATION at 08:00

## 2025-01-23 RX ADMIN — BUPIVACAINE HYDROCHLORIDE 15 ML: 5 INJECTION, SOLUTION EPIDURAL; INTRACAUDAL; PERINEURAL at 08:00

## 2025-01-23 RX ADMIN — ONDANSETRON 4 MG: 2 INJECTION INTRAMUSCULAR; INTRAVENOUS at 08:54

## 2025-01-23 RX ADMIN — CEFAZOLIN SODIUM 2000 MG: 2 SOLUTION INTRAVENOUS at 08:45

## 2025-01-23 RX ADMIN — EPHEDRINE SULFATE 10 MG: 50 INJECTION, SOLUTION INTRAVENOUS at 09:18

## 2025-01-23 RX ADMIN — FENTANYL CITRATE 25 MCG: 50 INJECTION INTRAMUSCULAR; INTRAVENOUS at 11:20

## 2025-01-23 RX ADMIN — FENTANYL CITRATE 25 MCG: 50 INJECTION INTRAMUSCULAR; INTRAVENOUS at 10:50

## 2025-01-23 RX ADMIN — MIDAZOLAM 2 MG: 1 INJECTION INTRAMUSCULAR; INTRAVENOUS at 08:00

## 2025-01-23 RX ADMIN — CHLORHEXIDINE GLUCONATE 15 ML: 1.2 SOLUTION ORAL at 08:01

## 2025-01-23 RX ADMIN — LIDOCAINE HYDROCHLORIDE 50 MG: 10 INJECTION, SOLUTION EPIDURAL; INFILTRATION; INTRACAUDAL; PERINEURAL at 08:48

## 2025-01-23 RX ADMIN — DEXAMETHASONE SODIUM PHOSPHATE 10 MG: 10 INJECTION, SOLUTION INTRAMUSCULAR; INTRAVENOUS at 08:54

## 2025-01-23 RX ADMIN — FENTANYL CITRATE 25 MCG: 50 INJECTION INTRAMUSCULAR; INTRAVENOUS at 10:36

## 2025-01-23 RX ADMIN — FENTANYL CITRATE 25 MCG: 50 INJECTION INTRAMUSCULAR; INTRAVENOUS at 11:31

## 2025-01-23 RX ADMIN — EPHEDRINE SULFATE 10 MG: 50 INJECTION, SOLUTION INTRAVENOUS at 09:14

## 2025-01-23 RX ADMIN — PROPOFOL 200 MG: 10 INJECTION, EMULSION INTRAVENOUS at 08:48

## 2025-01-23 RX ADMIN — PROPOFOL 50 MG: 10 INJECTION, EMULSION INTRAVENOUS at 08:50

## 2025-01-23 RX ADMIN — SODIUM CHLORIDE, SODIUM LACTATE, POTASSIUM CHLORIDE, AND CALCIUM CHLORIDE 125 ML/HR: .6; .31; .03; .02 INJECTION, SOLUTION INTRAVENOUS at 08:01

## 2025-01-23 NOTE — ANESTHESIA POSTPROCEDURE EVALUATION
Post-Op Assessment Note    CV Status:  Stable  Pain Score: 0    Pain management: adequate       Mental Status:  Sleepy and arousable   Hydration Status:  Stable   PONV Controlled:  None   Airway Patency:  Patent  Two or more mitigation strategies used for obstructive sleep apnea   Post Op Vitals Reviewed: Yes    No anethesia notable event occurred.    Staff: CRNA   Comments: spontaneously breathing, vss, fully endorsed to recovery w/o AC          Last Filed PACU Vitals:  Vitals Value Taken Time   Temp     Pulse 71    /64    Resp 14    SpO2 99

## 2025-01-23 NOTE — DISCHARGE INSTR - AVS FIRST PAGE
POSTOPERATIVE INSTRUCTIONS following SHOULDER SURGERY      MEDICATIONS:  Resume all home medications unless otherwise instructed by your surgeon.  Pain Medication:   Take Tylenol 1000mg three times a day  on prescribed schedule for 7 days. Continue home diclofenac  Take 5mg Oxycodone as needed every 4-6 hours for severe pain   If you were given a regional anesthetic (nerve block), it is helpful to take your pain medication before the block wear off.    Possible side effects include nausea, constipation, and urinary retention.  If you experience these side effects, please call our office for assistance.  Pain med refills are authorized only during office hours (8am-4pm, Mon-Fri).  Nausea Medication:   Zofran 4mg, take 1 tablet every 6 hours as needed for nausea or vomiting   Fill prescription ONLY if you expericnce severe nausea.  Blood Clot Prevention:   Pump your foot up and down 20 times per hour while you are less mobile.  Ambulate with your crutches at least once every hour   Take Aspirin 81mg Twice daily for 4 weeks    WOUND CARE:  Keep the dressing clean and dry.  Light drainage may occur the first 2 days postop.  You may remove the dressings and get the incision wet in the shower 72 hours after surgery.  DO NOT remove steri-strips or sutures.  DO NOT immerse the incision under water.  Carefully pat the incision dry.  If there is wound drainage, re-apply a fresh dry gauze dressing.  Please call our office ([phone]) if you experience either of the following:  Sudden increase in swelling, redness, or warmth at the surgical site  Excessive incisional drainage that persists beyond the 3rd day after surgery  Oral temperature greater than 101 degrees, not relieved with Tylenol  Shortness of breath, chest pain, nausea, or any other concerning symptoms  If it is after hours and you cannot reach someone, go to the nearest emergency room    ICE:  You may use Ice to help with pain control   Place ice on the  operative site for 20 minutes at a time when you are in pain     SLING:  None.  You are allowed full range of motion as tolerated.    ACTIVITY:   DO NOT lift, carry, push, or pull anything with your operative arm.  You are allowed FULL RANGE OF MOTION as tolerated.  Place a pillow behind the elbow while lying down.  Sleeping in a more upright position (recliner) may be more comfortable initially.  Elbow, Wrist, and Finger Motion:  You may take your elbow out of the slight carefully to move your elbow, wrist, and fingers. Follow your motion precautions for the shoulder. Replace the sling immediately after.     PHYSICAL THERAPY:  Begin therapy on THE DAY AFTER SURGERY.  You were given a prescription for therapy at your preoperative office visit.  If you do not have physical therapy scheduled yet, please call our office for assistance.    FOLLOW-UP APPOINTMENT:  2 weeks after surgery with:      Brayden Dos Santos MD    [facility] [address], [address], [address]  [place] [address]    [facility] [id number] [place] [id number]      Phone:   [phone]

## 2025-01-23 NOTE — ANESTHESIA PROCEDURE NOTES
Peripheral Block    Patient location during procedure: holding area  Start time: 1/23/2025 8:00 AM  Reason for block: at surgeon's request and post-op pain management  Staffing  Performed by: Jada Adhikari MD  Authorized by: Jada Adhikari MD    Preanesthetic Checklist  Completed: patient identified, IV checked, site marked, risks and benefits discussed, surgical consent, monitors and equipment checked, pre-op evaluation and timeout performed  Peripheral Block  Patient position: supine  Prep: ChloraPrep  Patient monitoring: continuous pulse oximetry, frequent blood pressure checks and heart rate  Block type: Interscalene  Laterality: right  Injection technique: single-shot  Procedures: ultrasound guided, Ultrasound guidance required for the procedure to increase accuracy and safety of medication placement and decrease risk of complications.  Ultrasound permanent image saved  bupivacaine liposomal (EXPAREL) 1.3 % injection 20 mL - Perineural   20 mL - 1/23/2025 8:00:00 AM  bupivacaine (PF) (MARCAINE) 0.5 % injection 20 mL - Perineural   15 mL - 1/23/2025 8:00:00 AM  Needle  Needle type: Stimuplex   Needle gauge: 22 G  Needle length: 4 in  Needle localization: ultrasound guidance  Assessment  Injection assessment: frequent aspiration, incremental injection, needle tip visualized at all times, injected with ease, negative aspiration, negative for heart rate change, no symptoms of intraneural/intravenous injection and no paresthesia on injection  Paresthesia pain: none  Post-procedure:  site cleaned  patient tolerated the procedure well with no immediate complications

## 2025-01-23 NOTE — ANESTHESIA POSTPROCEDURE EVALUATION
Post-Op Assessment Note            No anethesia notable event occurred.    Staff: Anesthesiologist           Last Filed PACU Vitals:  Vitals Value Taken Time   Temp 97.1 °F (36.2 °C) 01/23/25 1003   Pulse 93 01/23/25 1120   /78 01/23/25 1120   Resp 20 01/23/25 1120   SpO2 98 % 01/23/25 1141       Modified Tati:     Vitals Value Taken Time   Activity 2 01/23/25 1050   Respiration 2 01/23/25 1050   Circulation 2 01/23/25 1050   Consciousness 2 01/23/25 1050   Oxygen Saturation 2 01/23/25 1050     Modified Tati Score: 10

## 2025-01-23 NOTE — PERIOPERATIVE NURSING NOTE
AVS reviewed with patient and his wife at bed side and all concerns were answered. IV line is taken out. Patient tolerated PO fluids well. Denies any pain at this time. Surgical site dressing is clean, dry and intact. Neurovascular assessment to Right arm is intact and WDL. Immobilizer is on. All belongings were sent with patient. Patient left floor with firm understanding of discharge instructions. Patient escorted to his ride via wheelchair by this nurse.

## 2025-01-23 NOTE — H&P
Assessment/Plan:  1. Adhesive capsulitis of right shoulder  Ambulatory Referral to Orthopedic Surgery       2. Incomplete tear of right rotator cuff, unspecified whether traumatic  Ambulatory Referral to Orthopedic Surgery          Scribe Attestation       I,:  Clare Watson am acting as a scribe while in the presence of the attending physician.:       I,:  Brayden Dos Santos MD personally performed the services described in this documentation    as scribed in my presence.:              Tony is a pleasant 54 y.o. male referred to my office for evaluation of right shoulder. MRI demonstrates a partial thickness supraspinatus tear and adhesive capsulitis. Patient has tried and failed non operative treatment including but not limited to physical therapy, glenohumeral corticosteroid injection, OTC analgesics, and activity modification. At this time a right shoulder arthroscopy SAMUEL with lysis of adhesions and manipulation under anesthesia is an option.. Patient was amenable to this. Risks and benefits of surgery were discussed, pre and post operative expectations reviewed.  After full discussion he would like to pursue operative intervention.  Consent was signed.  He will meet with my surgery scheduler today. We will see him back in clinic post operatively.  We did discuss risk of recurrent adhesive capsulitis after lysis of adhesions.  He will plan to start physical therapy the day after surgery and then every day for 14 days and space it out after that.        Given that the patient has failed conservative treatment and continues to have severe pain and/or dysfunction that limits their activities of daily living, they would like to proceed with operative intervention.  We discussed risks, benefits and alternatives to surgery today in the clinic. We discussed with the patient the risks of no treatment, non-operative treatment, and operative treatment. The risks of operative intervention were discussed and include but  are not limited to: Infection, bleeding, stiffness, loss of range of motion, blood clot, failure of surgery, fracture, delayed union, nonunion, malunion, risk of potential future arthritis, continued problems with swelling, injury to surrounding structures/nerve/artery/vein, recurrence of cysts, failure of hardware, retained hardware and/or foreign body, symptomatic hardware, and continued instability, pain, dysfunction, or disability despite repair.                     Subjective:   Tony Ramsay is a 54 y.o. male who presents for evaluation of right shoulder, he is a referral from Dr Aviles. Patients pain started in May after a possible injury. He describes an intermittent sharp stabbing pain in the anterior aspect of the shoulder that can radiate laterally with movement. He states this is affecting his daily living. He is unable to perform tasks at home secondary to his shoulder pain. He does not feel he is improving with physical therapy.      Review of Systems   Constitutional:  Negative for chills and fever.   HENT:  Negative for ear pain and sore throat.    Eyes:  Negative for pain and visual disturbance.   Respiratory:  Negative for cough and shortness of breath.    Cardiovascular:  Negative for chest pain and palpitations.   Gastrointestinal:  Negative for abdominal pain and vomiting.   Genitourinary:  Negative for dysuria and hematuria.   Musculoskeletal:  Negative for arthralgias and back pain.   Skin:  Negative for color change and rash.   Neurological:  Negative for seizures and syncope.   All other systems reviewed and are negative.           Medical History        Past Medical History:   Diagnosis Date    Diabetes mellitus (HCC)      Gout      High cholesterol      Tendonitis              Surgical History   No past surgical history on file.        Family History   Family History   Family history unknown: Yes            Social History           Occupational History    Not on file   Tobacco Use     Smoking status: Never    Smokeless tobacco: Never   Vaping Use    Vaping status: Never Used   Substance and Sexual Activity    Alcohol use: No    Drug use: No    Sexual activity: Not on file           Current Medications      Current Outpatient Medications:     Continuous Glucose Sensor (FreeStyle Christian 2 Sensor) MISC, , Disp: , Rfl:     diclofenac (VOLTAREN) 75 mg EC tablet, Take 1 tablet (75 mg total) by mouth 2 (two) times a day, Disp: 60 tablet, Rfl: 0    Empagliflozin-metFORMIN HCl (Synjardy) 5-1000 MG TABS, , Disp: , Rfl:     ezetimibe (ZETIA) 10 mg tablet, , Disp: , Rfl:     omega-3-acid ethyl esters (LOVAZA) 1 g capsule, , Disp: , Rfl:     cholecalciferol (VITAMIN D3) 400 units tablet, Take 400 Units by mouth daily (Patient not taking: Reported on 11/19/2024), Disp: , Rfl:     colchicine (COLCRYS) 0.6 mg tablet, Take 1 tablet (0.6 mg total) by mouth daily for 4 days, Disp: 4 tablet, Rfl: 0    colesevelam (WELCHOL) 625 mg tablet, , Disp: , Rfl:     cyclobenzaprine (FLEXERIL) 10 mg tablet, , Disp: , Rfl:     diclofenac sodium (VOLTAREN) 1 %, Apply 2 g topically 4 (four) times a day, Disp: 240 g, Rfl: 0    Icosapent Ethyl (Vascepa) 1 g CAPS, , Disp: , Rfl:     Synjardy XR 5-1000 MG TB24, , Disp: , Rfl:         Allergies   No Known Allergies        Objective:      Vitals:     11/19/24 1318   BP: 149/80   Pulse: 80         Ortho Exam  Right Shoulder Exam      Range of Motion   Active abduction:  130   Right shoulder external rotation: 15.   Forward flexion:  130   Right shoulder internal rotation 0 degrees: Right Hip side pocket.      Muscle Strength   Abduction: 5/5   Internal rotation: 5/5   External rotation: 5/5   Supraspinatus: 5/5   Subscapularis: 5/5   Biceps: 5/5      Other   Sensation: normal  Pulse: present (2+ radial)     Physical Exam  Vitals and nursing note reviewed.   Constitutional:       Appearance: Normal appearance.   HENT:      Head: Normocephalic and atraumatic.      Right Ear: External ear  normal.      Left Ear: External ear normal.   Eyes:      Extraocular Movements: Extraocular movements intact.      Conjunctiva/sclera: Conjunctivae normal.   Cardiovascular:      Rate and Rhythm: Normal rate.      Pulses: Normal pulses.   Pulmonary:      Effort: Pulmonary effort is normal.   Musculoskeletal:         General: Normal range of motion.      Cervical back: Normal range of motion and neck supple.      Comments: See ortho exam   Abdomen: Soft, NT  Skin:     General: Skin is warm and dry.   Neurological:      General: No focal deficit present.      Mental Status: He is alert.   Psychiatric:         Behavior: Behavior normal.            I have personally reviewed pertinent films in PACS and my interpretation is as follows:  MRI of right shoulder obtained 11/7/24 demonstrates partial-thickness articular sided supraspinatus tear. Findings of adhesive capsulitis noted.

## 2025-01-23 NOTE — NURSING NOTE
Patient returned from PACU alert and oriented times 4. Pt reports 6/10 pain; declined medication at this time. Dressing is CDI. Call bell within reach, bed in lowest position, side rails up, beverage at bedside, ride at bedside. Per pt, no questions or concerns at this time

## 2025-01-23 NOTE — OP NOTE
OPERATIVE REPORT  PATIENT NAME: Tony Ramsay    :  1970  MRN: [id number]  Pt Location: [facility] OR ROOM 01    SURGERY DATE: 2025    Surgeons and Role:     * Brayden Dos Santos MD - Primary     * Frederick Galindo PA-C - Assisting    Preop Diagnosis:  Adhesive capsulitis of right shoulder [M75.01]    Post-Op Diagnosis Codes:     * Adhesive capsulitis of right shoulder [M75.01]    Procedure(s):  Right - ARTHROSCOPY SHOULDER with Lysis of Adhesion and Manipulation under anesthesia  Right Shoulder Arthroscopic Extensive Debridement and Subacromial Decompression    Specimen(s):  * No specimens in log *    Estimated Blood Loss:   Minimal    Drains:  * No LDAs found *    Anesthesia Type:   General w/ Interscalene Block    Operative Indications:  Adhesive capsulitis of right shoulder [M75.01]  Tony is a pleasant 54 y.o. male referred to my office for evaluation of right shoulder. MRI demonstrates a partial thickness supraspinatus tear and adhesive capsulitis.  He is diabetic and has moderate to severe adhesive capsulitis present.  Patient has tried and failed non operative treatment including but not limited to physical therapy, glenohumeral corticosteroid injection, OTC analgesics, and activity modification.  We had a long discussion about management going forward including operative and nonoperative treatments.  At this time a right shoulder arthroscopy with lysis of adhesions and manipulation under anesthesia is an option.. Patient was amenable to this given that he is failed nonoperative treatment.. Risks and benefits of surgery were discussed, pre and post operative expectations reviewed.  After full discussion he would like to pursue operative intervention.  Consent was signed.  He will meet with my surgery scheduler today. We will see him back in clinic post operatively.  We did discuss risk of recurrent adhesive capsulitis after lysis of adhesions.  He will plan to start physical therapy the day after  surgery and then every day for 14 days and space it out after that.        Given that the patient has failed conservative treatment and continues to have severe pain and/or dysfunction that limits their activities of daily living, they would like to proceed with operative intervention.  We discussed risks, benefits and alternatives to surgery today in the clinic. We discussed with the patient the risks of no treatment, non-operative treatment, and operative treatment. The risks of operative intervention were discussed and include but are not limited to: Infection, bleeding, stiffness, loss of range of motion, blood clot, failure of surgery, fracture, delayed union, nonunion, malunion, risk of potential future arthritis, continued problems with swelling, injury to surrounding structures/nerve/artery/vein, recurrence of cysts, failure of hardware, retained hardware and/or foreign body, symptomatic hardware, and continued instability, pain, dysfunction, or disability despite surgical intervention.     Operative Findings:  See below      Complications:   None    Procedure and Technique:  The patient was greeted in the preoperative holding area, where history, physical exam, review of surgical plan, and operative site cruz were performed. The patient was transferred to the operative suite, placed supine on the operative table.  General endotracheal anesthesia was induced, and perioperative intravenous antibiotics were administered, 2g of ancef.  The patient was safely repositioned to the beach-chair position with cervical spine in neutral position. All bony prominences were well padded. SCDs were placed on bilateral lower extremities.      Exam under anesthesia was then performed which demonstrated limited range of motion. Stability exam showed stable load and shift.     Preoperative range of motion:  Forward elevation 100  External rotation at 0 to 5 degrees  External rotation at 90 degrees to 80 degrees  Internal  rotation at 90 degrees to 0 degrees     The operative shoulder was prepped and draped in usual sterile fashion. A time out was performed with the participation of the entire operative and anesthesia team. Standard posterior glenohumeral and anterior rotator interval portals were established, through which the scope and probe were inserted respectively, which aided and assisted in performance of a complete and thorough diagnostic arthroscopy, which demonstrated the following findings:    Operative Findings:  Humeral head: Fraying of the articular cartilage  Glenoid: Fraying of the articular cartilage  Biceps tendon and biceps anchor: Mild fraying of the biceps anchor with no osvaldo tear  Rotator Cuff: Inflamed insertion however no tearing present  Glenoid Labrum hypertrophic inflamed and frayed labrum  Subacromial subacromial bursitis present  Thickened anterior joint capsule, thickened [facility] L, IgH L, SG HL    I then performed an extensive glenohumeral debridement of frayed labral tissue, hypertrophic synovium, degenerative chondral fraying.  We then performed a lysis of adhesions using both an arthroscopic biter and radiofrequency ablator.  Is performed first in the rotator interval and SG HL to create room anteriorly.  The thickened [facility] L and anterior capsule were then divided using both the arthroscopic biter and radiofrequency ablator this was done down to the 6:00 point on the glenoid.  Following this the arthroscope was placed anteriorly and we incised the posterior capsule using radiofrequency ablator and a biter.  Following this there was good circumferential gapping around the capsule to help with our manipulation under anesthesia.    I then placed the arthroscope into the subacromial space. After localization with a spinal needle, I made a lateral incision to establish a lateral portal.  I performed an extensive bursectomy , removed the soft tissue from the undersurface of the acromion. There no prominent  subacromial spur and adequate room for the rotator cuff without signs of impingement.  The rotator cuff appeared intact from the subacromial space.              The shoulder was copiously irrigated and drained.  The arthroscope was taken out.        We then carefully performed a manipulation under anesthesia obtaining increased range of motion in all planes.    Forward elevation: 160  External rotation at 0 to 70 degrees  External rotation at 90 degrees to 100 degrees  Internal rotation at 90 degrees to 60 degrees      The arthroscopic wounds were closed with 3-0 Nylon sutures. A sterile dressing and shoulder immobilizer were placed on the operative extremity.  The patient emerged from anesthesia and was taken to the recovery room in stable condition.  There were no apparent complications.     I was present for the entire procedure., A qualified resident physician was not available., and A physician assistant was required during the procedure for retraction, tissue handling, dissection and suturing.    Patient Disposition:  PACU              SIGNATURE: Brayden Dos Santos MD  DATE: January 23, 2025  TIME: 9:53 AM        Patient is placed in a sling which he will wear for 2 to 3 days until the nerve block wears off.  He will start physical therapy postop day 1 for aggressive range of motion training.  He will continue physical therapy until he is seen in the office.  I will see him back in 2 weeks for repeat evaluation and suture removal.

## 2025-01-24 ENCOUNTER — EVALUATION (OUTPATIENT)
Dept: PHYSICAL THERAPY | Facility: CLINIC | Age: 55
End: 2025-01-24
Payer: COMMERCIAL

## 2025-01-24 DIAGNOSIS — M25.511 CHRONIC RIGHT SHOULDER PAIN: Primary | ICD-10-CM

## 2025-01-24 DIAGNOSIS — G89.29 CHRONIC RIGHT SHOULDER PAIN: Primary | ICD-10-CM

## 2025-01-24 PROCEDURE — 97164 PT RE-EVAL EST PLAN CARE: CPT

## 2025-01-24 NOTE — PROGRESS NOTES
PT Evaluation     Today's date: 2025  Patient name: Tony Ramsay  : 1970  MRN: [id number]  Referring provider: Brayden Dos Santos MD  Dx:   Encounter Diagnosis     ICD-10-CM    1. Chronic right shoulder pain  M25.511     G89.29             Assessment  Assessment details: Patient is a 54 y.o. Male who presents to skilled outpatient PT with referring diagnosis of chronic right shoulder pain s/p a manipulation under anesthesia. Patient presents to clinic decreased ROM, power, and sensation in right shoulder. The aforementioned impairments have limited the patient's ability to don and doff clothing, reach overhead, and lift anything over 1#. Patient education performed during today's session included: HEP as noted on flow sheet, nature of shoulder injury, and modalities to help ease pain levels. Patient verbalized understanding of POC.    Impairments: Abnormal or restricted ROM, Activity intolerance, Impaired physical strength, Lacks appropriate HEP, Poor posture, and Pain with function  Understanding of Dx/Px/POC: Excellent  Prognosis: Good      Plan  Patient would benefit from: PT Eval and Skilled PT  Planned modality interventions: Dry needling, Biofeedback, Cryotherapy, TENS, Thermotherapy: Hydrocollator Packs, and Traction  Planned therapy interventions: Abdominal trunk stabilization, Breathing training, Dry Needling, Functional ROM exercises, HEP, Joint mobilization, Manual therapy, Pascual taping, Neuromuscular re-education, Patient education, Postural training, Strengthening, Stretching, Therapeutic activities, and Therapeutic exercises  Frequency: 5x/wk for 2 weks, then 2-3x per week  Duration in weeks: 8  Plan of Care beginning date: 25  Plan of Care expiration date: 8 weeks - 3/21/2025  Treatment plan discussed with: Patient       Goals  Short Term Goals (4 weeks):    - Patient will be independent in basic HEP 2-3 weeks  - Patient will have 0/10 pain at rest  - Patient will  demonstrate >1/3 improvement in MMT grade as applicable  - Patient will be able to achieve 160 deg of passive flexion    Long Term Goals (8 weeks):  - Patient will be independent in a comprehensive home exercise program  - Patient FOTO score will improve by 10 points.   - Patient will self-report >75% improvement in function  - Patient will be able to lift 3# to overhead shelf      Subjective    History of Present Illness  - Mechanism of injury: Pain since last May. Manipulation under anesthesia performed on 25 with surgical notes for aggressive ROM in PT.     - Functional limitations: sleep on side, don/ doff clothing,     - Patient goals: no pain     Red Flag Screening    Positive for: age >50 years old   Negative for: history of cancer, fever, chills, night sweats, weight loss, recent infection, immunosuppression, rest/night pain, saddle anesthesia, bladder dysfunction, and LE neurological deficits    Pain  - Current pain ratin-2/10  - At best pain ratin-2/10  - At worst pain ratin-2/10  - Location: right shoulder  - Alleviating factors: rest, anti inflammatory    Social Support  - Steps to enter house: 4  - Stairs in house: 15   - Bedroom/bathroom set up: 2nd floor  - Lives in: house  - Lives with: wife      Objective   UE MMT    L Shoulder Flexion: 5/5 R Shoulder Flexion:    L Shoulder Extension: 5/5 R Shoulder Extension:    L Shoulder Abduction: 5/5 R Shoulder Abduction:    L Shoulder  IR: 5/5 R Shoulder  IR:    L Shoulder  ER: 5/5 R Shoulder  ER:    L Elbow Extension: 5/5 R Elbow Extension:    L Elbow Flexion: 5/5 R Elbow Flexion:   L Wrist Flexion: 5/5 R Wrist Flexion:    L Wrist Extension: 5/5 R Wrist Extension:          Sensation  - Light touch: diminished on right    Palpation   -b/l upper trapezius    Shoulder AROM L R   Flexion wfl deg    Extension wfl deg     ER wfl deg    IR wfl deg    Abduction wfl deg    Functional IR BTB wfl    Functional ER BTH wfl      Shoulder PROM L R   Flexion   Wfl deg 150 deg   Extension wfl deg 10 deg   ER wfl deg 40 deg   IR wfl deg 90 deg   Abduction wfl deg 90 deg        Precautions: s/p manipulation under anesthesia   Past Medical History:   Diagnosis Date    Diabetes mellitus (HCC)     Gout     High cholesterol     Tendonitis          Insurance Eval/ Re-eval POC expires Auth Status Total visits  Start date  Expiration date Misc   CMS 1/24/25 3/21/25                    Date 1/24/2025        Visit Number IE    FOTO    Auth                  Manual         GH mobs  PROM in all directions        AC mobs          Scap mobs          TPR                  Neuro Re-ed         Shoulder isos         Scap retract          Serratus push up                                    Thera Ex         Pulleys          Shoulder PROM         TB shoulder ext         TB row          TB IR/ER          Wall slides          Prone I, Y, T         SL ER         pendulum HEP        Thera Activity         Patient education Fitted for sling        Posture education                                                      Modalities         Ice

## 2025-01-27 ENCOUNTER — OFFICE VISIT (OUTPATIENT)
Dept: PHYSICAL THERAPY | Facility: CLINIC | Age: 55
End: 2025-01-27
Payer: COMMERCIAL

## 2025-01-27 DIAGNOSIS — G89.29 CHRONIC RIGHT SHOULDER PAIN: Primary | ICD-10-CM

## 2025-01-27 DIAGNOSIS — M25.511 CHRONIC RIGHT SHOULDER PAIN: Primary | ICD-10-CM

## 2025-01-27 PROCEDURE — 97110 THERAPEUTIC EXERCISES: CPT

## 2025-01-27 PROCEDURE — 97112 NEUROMUSCULAR REEDUCATION: CPT

## 2025-01-27 PROCEDURE — 97140 MANUAL THERAPY 1/> REGIONS: CPT

## 2025-01-27 NOTE — PROGRESS NOTES
Daily Note     Today's date: 2025  Patient name: Tony Ramsay  : 1970  MRN: [id number]  Referring provider: Brayden Dos Santos MD  Dx:   Encounter Diagnosis     ICD-10-CM    1. Chronic right shoulder pain  M25.511     G89.29                      Subjective: About a 6/10 pain, fels clicking in shoulder when he leans over to do pendulum      Objective: See treatment diary below      Assessment: Tolerated treatment well. Patient would benefit from continued PT in order to maintain/ increase ROM gained under manipulation. Doesn't want to take anything but over the counter pain medication. PROM limites to 90 deg in abd and 150 deg flexion due to pain, empty end feel. Continue to progress ROM. Noted decreased tissue extensibility of right upper trap.       Plan: Continue per plan of care.      Precautions: s/p manipulation under anesthesia   Past Medical History:   Diagnosis Date    Diabetes mellitus (HCC)     Gout     High cholesterol     Tendonitis          Insurance Eval/ Re-eval POC expires Auth Status Total visits  Start date  Expiration date Misc   CMS 1/24/25 3/21/25                    Date 2025       Visit Number IE    FOTO    Auth                  Manual         GH mobs  PROM in all directions PROM 20 min   Gr 2 inferior, posterior mobs       AC mobs          Scap mobs          TPR  Right upper trapezius, scalenes                Neuro Re-ed         Shoulder isos         Scap retract   30x       Serratus push up                                    Thera Ex         Pulleys          Shoulder PROM         TB shoulder ext         TB row          TB IR/ER          Wall slides          Prone I, Y, T         SL ER  Towel slides on table with right hand on left, 5 sec hold at end range 15x       pendulum HEP 2 min       Thera Activity         Patient education Fitted for sling        Posture education                                                      Modalities         Ice

## 2025-01-28 ENCOUNTER — OFFICE VISIT (OUTPATIENT)
Dept: PHYSICAL THERAPY | Facility: CLINIC | Age: 55
End: 2025-01-28
Payer: COMMERCIAL

## 2025-01-28 DIAGNOSIS — M25.511 CHRONIC RIGHT SHOULDER PAIN: Primary | ICD-10-CM

## 2025-01-28 DIAGNOSIS — G89.29 CHRONIC RIGHT SHOULDER PAIN: Primary | ICD-10-CM

## 2025-01-28 PROCEDURE — 97140 MANUAL THERAPY 1/> REGIONS: CPT | Performed by: PHYSICAL THERAPIST

## 2025-01-28 PROCEDURE — 97110 THERAPEUTIC EXERCISES: CPT | Performed by: PHYSICAL THERAPIST

## 2025-01-28 NOTE — PROGRESS NOTES
Daily Note     Today's date: 2025  Patient name: Tony Ramsay  : 1970  MRN: [id number]  Referring provider: Brayden Dos Santos MD  Dx:   Encounter Diagnosis     ICD-10-CM    1. Chronic right shoulder pain  M25.511     G89.29                      Subjective: Pt reports that after last session he had a very hard time sleeping. States that he had pain in his shoulder. Pt is taking tylenol and states that he has been icing regularly.       Objective: See treatment diary below      Assessment: Tolerated treatment fair. Patient  requires multiple cues in order to perform therex correctly. His PROM limites to 90 deg in abd and 110 deg flexion due to pain and constant muscle guarding.  Demos UT tenderness to palpation.  Continue to progress ROM as tolerated. Pt was educated on the importance of RICE and pain management. Pt will continue to benefit from skilled physical therapy in order to maximize strength and improve mobility following surgical procedure.        Plan: Continue per plan of care.      Precautions: s/p manipulation under anesthesia   Past Medical History:   Diagnosis Date    Diabetes mellitus (HCC)     Gout     High cholesterol     Tendonitis          Insurance Eval/ Re-eval POC expires Auth Status Total visits  Start date  Expiration date Misc   CMS 1/24/25 3/21/25                    Date 25      Visit Number IE    FOTO    Auth                  Manual         GH mobs  PROM in all directions PROM 20 min   Gr 2 inferior, posterior mobs PROM 20 min flexion   And abd     Flexion- 110 deg- patient guarding    Abd 90 deg       AC mobs          Scap mobs          TPR  Right upper trapezius, scalenes Right UT- IM gentle STM                Neuro Re-ed         Shoulder isos   Shoulder rolls- patient had to stop due to pain       Scap retract   30x 15 x 5s hold cues required       Serratus push up            Chin Tucks sitting 5s x 10                         Thera Ex          Pulleys          Shoulder PROM   IM flexion and abd see above       TB shoulder ext         TB row          TB IR/ER          Wall slides          Prone I, Y, T         SL ER  Towel slides on table with right hand on left, 5 sec hold at end range 15x Towel slides on table with right hand on left 5s x 20 to end range       pendulum HEP 2 min 10s x 7       Thera Activity         Patient education Fitted for sling  Postural education         Posture education                                                      Modalities         Ice

## 2025-01-29 ENCOUNTER — OFFICE VISIT (OUTPATIENT)
Dept: PHYSICAL THERAPY | Facility: CLINIC | Age: 55
End: 2025-01-29
Payer: COMMERCIAL

## 2025-01-29 DIAGNOSIS — G89.29 CHRONIC RIGHT SHOULDER PAIN: Primary | ICD-10-CM

## 2025-01-29 DIAGNOSIS — M25.511 CHRONIC RIGHT SHOULDER PAIN: Primary | ICD-10-CM

## 2025-01-29 PROCEDURE — 97112 NEUROMUSCULAR REEDUCATION: CPT

## 2025-01-29 PROCEDURE — 97110 THERAPEUTIC EXERCISES: CPT

## 2025-01-29 PROCEDURE — 97140 MANUAL THERAPY 1/> REGIONS: CPT

## 2025-01-29 NOTE — PROGRESS NOTES
Daily Note     Today's date: 2025  Patient name: Tony Ramsay  : 1970  MRN: [id number]  Referring provider: Brayden Dos Santos MD  Dx:   Encounter Diagnosis     ICD-10-CM    1. Chronic right shoulder pain  M25.511     G89.29                      Subjective: Had trouble sleeping last night, probably didn't get more than 4 hours, did ice but it was only a little helpful      Objective: See treatment diary below      Assessment: Tolerated treatment fair. Patient would benefit from continued PT in order to work on gaining shoulder ROM. Limited by pain to flexion to 124 deg and abduction to 90 deg. Pain in pec, biceps, and deltoid. Pendulum is painful and Tony is having trouble sleeping due to shoulder pain. PROM limited by pain levels with empty end feel.       Plan: Continue per plan of care.      Precautions: s/p manipulation under anesthesia   Past Medical History:   Diagnosis Date    Diabetes mellitus (HCC)     Gout     High cholesterol     Tendonitis          Insurance Eval/ Re-eval POC expires Auth Status Total visits  Start date  Expiration date Misc   CMS 1/24/25 3/21/25                    Date 25     Visit Number IE    FOTO    Auth                  Manual         GH mobs  PROM in all directions PROM 20 min   Gr 2 inferior, posterior mobs PROM 20 min flexion   And abd     Flexion- 110 deg- patient guarding    Abd 90 deg  PROM 20 min flexion   And abd     Flexion- 124 deg- patient guarding    Abd 90 deg      AC mobs          Scap mobs     PEMF 20 min during PROM      TPR  Right upper trapezius, scalenes Right UT- IM gentle STM  Right upper trapezius, scalenes              Neuro Re-ed    Shoulder shrugs 20x     Shoulder isos   Shoulder rolls- patient had to stop due to pain       Scap retract   30x 15 x 5s hold cues required  20x 5 sec hold     Serratus push up            Chin Tucks sitting 5s x 10                         Thera Ex         Pulleys          Shoulder  PROM   IM flexion and abd see above       TB shoulder ext         TB row          TB IR/ER          Wall slides          Prone I, Y, T         SL ER  Towel slides on table with right hand on left, 5 sec hold at end range 15x Towel slides on table with right hand on left 5s x 20 to end range  Towel slides on table with right hand on left 5s x 20 to end range      pendulum HEP 2 min 10s x 7       Thera Activity         Patient education Fitted for sling  Postural education    Sleeping education     Posture education                                                      Modalities         Ice

## 2025-01-30 ENCOUNTER — OFFICE VISIT (OUTPATIENT)
Dept: PHYSICAL THERAPY | Facility: CLINIC | Age: 55
End: 2025-01-30
Payer: COMMERCIAL

## 2025-01-30 DIAGNOSIS — G89.29 CHRONIC RIGHT SHOULDER PAIN: Primary | ICD-10-CM

## 2025-01-30 DIAGNOSIS — M25.511 CHRONIC RIGHT SHOULDER PAIN: Primary | ICD-10-CM

## 2025-01-30 PROCEDURE — 97112 NEUROMUSCULAR REEDUCATION: CPT

## 2025-01-30 PROCEDURE — 97110 THERAPEUTIC EXERCISES: CPT

## 2025-01-30 PROCEDURE — 97140 MANUAL THERAPY 1/> REGIONS: CPT

## 2025-01-30 NOTE — PROGRESS NOTES
Daily Note     Today's date: 2025  Patient name: Tony Ramsay  : 1970  MRN: [id number]  Referring provider: Brayden Dos Santos MD  Dx:   Encounter Diagnosis     ICD-10-CM    1. Chronic right shoulder pain  M25.511     G89.29           Subjective: Patient continues to struggle with R shoulder sharp and stabbing pain in the superior shoulder and medial shoulder blade areas.       Objective: See treatment diary below      Assessment: Tolerated treatment poor to fair- he has significant pain with every motion and activity, including cervical and thoracic spine ROM. Introduced more cervical spine and thoracic spine exercise to see if restriction there improves pain in the shoudler as patient is very stiff and hypomobile throughout. Posture education reinforced today. Cervical spine repeated motions had minimal effect on patients R shoulder pain. Patient would benefit from continued PT to continue with symptom management and return to function.      Plan: Continue per plan of care.      Precautions: s/p manipulation under anesthesia   Past Medical History:   Diagnosis Date    Diabetes mellitus (HCC)     Gout     High cholesterol     Tendonitis          Insurance Eval/ Re-eval POC expires Auth Status Total visits  Start date  Expiration date Misc   CMS 1/24/25 3/21/25                    Date 25    Visit Number IE    FOTO    Auth               Posture taping?    Manual         GH mobs  PROM in all directions PROM 20 min   Gr 2 inferior, posterior mobs PROM 20 min flexion   And abd     Flexion- 110 deg- patient guarding    Abd 90 deg  PROM 20 min flexion   And abd     Flexion- 124 deg- patient guarding    Abd 90 deg  PROM 8'    Flexion 110  Abduction 85    Tspine mobs     P-A mobs tspine and CTJ gr 3-4 throughout    Scap mobs     PEMF 20 min during PROM  STJ mobs in sidelying into retraction 3x30    TPR  Right upper trapezius, scalenes Right UT- IM gentle STM  Right  "upper trapezius, sherley              Neuro Re-ed    Shoulder shrugs 20x     Shoulder isos   Shoulder rolls- patient had to stop due to pain   Shoulder rolls x20     Scap retract   30x 15 x 5s hold cues required  20x 5 sec hold 20x5\"    Serratus push up            Chin Tucks sitting 5s x 10   Chin Tucks sitting 5s x 2x10                       Thera Ex         Pulleys      4'  warm up    Shoulder PROM   IM flexion and abd see above            Seated thoracic extension with foam roller 2x10     TB shoulder ext         TB row          TB IR/ER          Wall slides      Towel slides into flexion and abduction 3\"x20 each     Prone I, Y, T         SL ER  Towel slides on table with right hand on left, 5 sec hold at end range 15x Towel slides on table with right hand on left 5s x 20 to end range  Towel slides on table with right hand on left 5s x 20 to end range      pendulum HEP 2 min 10s x 7       Thera Activity         Patient education Fitted for sling  Postural education    Sleeping education Posture education    Posture education                                                      Modalities         Ice                        "

## 2025-01-31 ENCOUNTER — OFFICE VISIT (OUTPATIENT)
Dept: PHYSICAL THERAPY | Facility: CLINIC | Age: 55
End: 2025-01-31
Payer: COMMERCIAL

## 2025-01-31 DIAGNOSIS — G89.29 CHRONIC RIGHT SHOULDER PAIN: Primary | ICD-10-CM

## 2025-01-31 DIAGNOSIS — M25.511 CHRONIC RIGHT SHOULDER PAIN: Primary | ICD-10-CM

## 2025-01-31 PROCEDURE — 97140 MANUAL THERAPY 1/> REGIONS: CPT

## 2025-01-31 PROCEDURE — 97112 NEUROMUSCULAR REEDUCATION: CPT

## 2025-01-31 NOTE — PROGRESS NOTES
Daily Note     Today's date: 2025  Patient name: Tony Ramsay  : 1970  MRN: [id number]  Referring provider: Brayden Dos Santos MD  Dx:   Encounter Diagnosis     ICD-10-CM    1. Chronic right shoulder pain  M25.511     G89.29           Start Time: 1230  Stop Time: 1315  Total time in clinic (min): 45 minutes    Subjective: Not able to sleep much, 5/10 discomfort coming into clinic. Took tramadol today before coming into clinic      Objective: See treatment diary below      Assessment: Tolerated treatment fair. Patient demonstrated fatigue post treatment. Pain in all directional movements. Focus today on scapular mobility. Trailed A-P taping for support. Better able to abduct and flex left arm today (minimally). Tony is highly motivated to gain ROM. Empty end feel at all ranges, limited by pain.      Plan: Continue per plan of care.      Precautions: s/p manipulation under anesthesia   Past Medical History:   Diagnosis Date    Diabetes mellitus (HCC)     Gout     High cholesterol     Tendonitis          Insurance Eval/ Re-eval POC expires Auth Status Total visits  Start date  Expiration date Misc   CMS 1/24/25 3/21/25                    Date 25   Visit Number IE    FOTO    Auth               Posture taping?    Manual         GH mobs  PROM in all directions PROM 20 min   Gr 2 inferior, posterior mobs PROM 20 min flexion   And abd     Flexion- 110 deg- patient guarding    Abd 90 deg  PROM 20 min flexion   And abd     Flexion- 124 deg- patient guarding    Abd 90 deg  PROM 8'    Flexion 110  Abduction 85 PROM 15'  Flexion 115  Abduction 95 deg   Tspine mobs     P-A mobs tspine and CTJ gr 3-4 throughout Sidelying scapular mobilization   Scap mobs     PEMF 20 min during PROM  STJ mobs in sidelying into retraction 3x30    TPR  Right upper trapezius, scalenes Right UT- IM gentle STM  Right upper trapezius, scalenes              Neuro Re-ed    Shoulder shrugs  "20x     Shoulder isos   Shoulder rolls- patient had to stop due to pain   Shoulder rolls x20     Scap retract   30x 15 x 5s hold cues required  20x 5 sec hold 20x5\"    Serratus push up            Chin Tucks sitting 5s x 10   Chin Tucks sitting 5s x 2x10                       Thera Ex         Pulleys      4'  warm up    Shoulder PROM   IM flexion and abd see above            Seated thoracic extension with foam roller 2x10     TB shoulder ext         TB row          TB IR/ER          Wall slides      Towel slides into flexion and abduction 3\"x20 each     Prone I, Y, T         SL ER  Towel slides on table with right hand on left, 5 sec hold at end range 15x Towel slides on table with right hand on left 5s x 20 to end range  Towel slides on table with right hand on left 5s x 20 to end range      pendulum HEP 2 min 10s x 7       Thera Activity         Patient education Fitted for sling  Postural education    Sleeping education Posture education    Posture education                                                      Modalities         Ice                          "

## 2025-02-03 ENCOUNTER — OFFICE VISIT (OUTPATIENT)
Dept: PHYSICAL THERAPY | Facility: CLINIC | Age: 55
End: 2025-02-03
Payer: COMMERCIAL

## 2025-02-03 DIAGNOSIS — M25.511 CHRONIC RIGHT SHOULDER PAIN: Primary | ICD-10-CM

## 2025-02-03 DIAGNOSIS — G89.29 CHRONIC RIGHT SHOULDER PAIN: Primary | ICD-10-CM

## 2025-02-03 PROCEDURE — 97530 THERAPEUTIC ACTIVITIES: CPT

## 2025-02-03 PROCEDURE — 97140 MANUAL THERAPY 1/> REGIONS: CPT

## 2025-02-03 NOTE — PROGRESS NOTES
Daily Note     Today's date: 2/3/2025  Patient name: Tony Ramsay  : 1970  MRN: [id number]  Referring provider: Brayden Dos Santos MD  Dx:   Encounter Diagnosis     ICD-10-CM    1. Chronic right shoulder pain  M25.511     G89.29                      Subjective: Difficulty sleeping, heard a pop when he moved in his sleep and screamed from the pain      Objective: See treatment diary below  Dry needling consent for reviewed and signed by patient. Pt educated on dry needling to include but not limited to: risks/benefits/aftercare instructions. Provided with educational handout on DN. All questions and concerns answered and addressed.     Assessment: Tolerated treatment poor. Patient would benefit from continued PT in order to increase ROM. Thoracic and rib mobilization failed to change pain levels. Trialed dry needling as rhomboids and upper traps present with decreased tissue extensibility an pain. Reached out to Dr Dos Santos to make him aware of Tony's lack of progress.   Reported that taping from last session seemed to work for a little bit then he was getting a stabbing in shoulder blade.   Pt verbally consented to dry needling treatment session. Risks/benefits/aftercare instructions reviewed. All questions/concerns addressed.  Pt in seated position. Hand hygiene performed pre and post DN treatment session. Placement of needles in pathological tissue.   Right scapula and thoracic spine (needle location).  Total treatment duration to include set up/break down/in situ: 25 minutes. Patient was supervised by clinician throughout entirety of treatment session to include when DN in situ; clinician next to patient. All needles counted upon insertion and removal-- 10 needles. All accounted for and disposed in appropriate sharp container.     No adverse reaction to treatment. Pt advised may experience muscular fatigue and soreness. Pt verbalized understanding.        Plan: Continue per plan of care.     "  Precautions: s/p manipulation under anesthesia   Past Medical History:   Diagnosis Date    Diabetes mellitus (HCC)     Gout     High cholesterol     Tendonitis          Insurance Eval/ Re-eval POC expires Auth Status Total visits  Start date  Expiration date Misc   CMS 1/24/25 3/21/25                    Date 1/24/2025 1/27/25 01/28/25 1/29/25 1/30/25 1/31/25 2/3/25   Visit Number IE    FOTO     Auth                Taping from A-P    Manual       DN- see above   GH mobs  PROM in all directions PROM 20 min   Gr 2 inferior, posterior mobs PROM 20 min flexion   And abd     Flexion- 110 deg- patient guarding    Abd 90 deg  PROM 20 min flexion   And abd     Flexion- 124 deg- patient guarding    Abd 90 deg  PROM 8'    Flexion 110  Abduction 85 PROM 15'  Flexion 115  Abduction 95 deg PROM 10'   Tspine mobs     P-A mobs tspine and CTJ gr 3-4 throughout Sidelying scapular mobilization Sidelying scapular mobilization   Scap mobs     PEMF 20 min during PROM  STJ mobs in sidelying into retraction 3x30     TPR  Right upper trapezius, scalenes Right UT- IM gentle STM  Right upper trapezius, scalenes                Neuro Re-ed    Shoulder shrugs 20x      Shoulder isos   Shoulder rolls- patient had to stop due to pain   Shoulder rolls x20      Scap retract   30x 15 x 5s hold cues required  20x 5 sec hold 20x5\"     Serratus push up             Chin Tucks sitting 5s x 10   Chin Tucks sitting 5s x 2x10                          Thera Ex          Pulleys      4'  warm up     Shoulder PROM   IM flexion and abd see above             Seated thoracic extension with foam roller 2x10      TB shoulder ext          TB row           TB IR/ER           Wall slides      Towel slides into flexion and abduction 3\"x20 each      Prone I, Y, T          SL ER  Towel slides on table with right hand on left, 5 sec hold at end range 15x Towel slides on table with right hand on left 5s x 20 to end range  Towel slides on table with right hand on left 5s x " 20 to end range       pendulum HEP 2 min 10s x 7        Thera Activity          Patient education Fitted for sling  Postural education    Sleeping education Posture education  DN- education   Posture education                                                            Modalities          Ice

## 2025-02-04 ENCOUNTER — OFFICE VISIT (OUTPATIENT)
Dept: PHYSICAL THERAPY | Facility: CLINIC | Age: 55
End: 2025-02-04
Payer: COMMERCIAL

## 2025-02-04 DIAGNOSIS — M25.511 CHRONIC RIGHT SHOULDER PAIN: Primary | ICD-10-CM

## 2025-02-04 DIAGNOSIS — G89.29 CHRONIC RIGHT SHOULDER PAIN: Primary | ICD-10-CM

## 2025-02-04 PROCEDURE — 97110 THERAPEUTIC EXERCISES: CPT

## 2025-02-04 NOTE — PROGRESS NOTES
"Daily Note     Today's date: 2025  Patient name: Tony Ramsay  : 1970  MRN: [id number]  Referring provider: Brayden Dos Santos MD  Dx:   Encounter Diagnosis     ICD-10-CM    1. Chronic right shoulder pain  M25.511     G89.29           Subjective: Notes that the dry needling was a temporary relief and then he still has the same amount of pain. He also had pain in the R hand between 3rd and 4th rays described as throbbing pain.       Objective: See treatment diary below      Assessment: Tolerated treatment poor. Patient's pain is not mechanically responsive, and does not improve with hands on manual assist, (not any better with AAROM) however he did have minor improvement in pain following PT assisted supine cervical retraction and extension. Patient will have follow up tomorrow and may benefit from continued neck mobilization.   Patient would benefit from continued PT and follow up with ortho and see if he is a good candidate for further pain management I.e steroid pack or other.     Plan: Continue per plan of care.      Precautions: s/p manipulation under anesthesia   Past Medical History:   Diagnosis Date    Diabetes mellitus (HCC)     Gout     High cholesterol     Tendonitis          Insurance Eval/ Re-eval POC expires Auth Status Total visits  Start date  Expiration date Misc   CMS 1/24/25 3/21/25                    Date 1/30/25 1/31/25 2/3/25 2/4/25    Visit Number FOTO       Auth          Taping from A-P  Patient def taping    Manual   DN- see above     GH mobs  PROM 8'    Flexion 110  Abduction 85 PROM 15'  Flexion 115  Abduction 95 deg PROM 10'     Tspine mobs P-A mobs tspine and CTJ gr 3-4 throughout Sidelying scapular mobilization Sidelying scapular mobilization     Scap mobs  STJ mobs in sidelying into retraction 3x30   Cervical retraction with extension 15x3\"    TPR                Neuro Re-ed        Shoulder isos Shoulder rolls x20        Scap retract  20x5\"   20x3\"    Serratus push up    " "     Chin Tucks sitting 5s x 2x10    Supine 20x3\"        SOC x20             Thera Ex        Pulleys  4'  warm up           Prone punches with dowel 2x10         AAROM with dowel into flexion    Shoulder PROM    PROM 15' in supine and sitting      Seated thoracic extension with foam roller 2x10        TB shoulder ext        TB row         TB IR/ER         Wall slides  Towel slides into flexion and abduction 3\"x20 each        Prone I, Y, T        SL ER        pendulum        Thera Activity        Patient education Posture education  DN- education     Posture education                                                Modalities        Ice                             "

## 2025-02-05 ENCOUNTER — OFFICE VISIT (OUTPATIENT)
Dept: OBGYN CLINIC | Facility: CLINIC | Age: 55
End: 2025-02-05

## 2025-02-05 ENCOUNTER — OFFICE VISIT (OUTPATIENT)
Dept: PHYSICAL THERAPY | Facility: CLINIC | Age: 55
End: 2025-02-05
Payer: COMMERCIAL

## 2025-02-05 VITALS — HEIGHT: 71 IN | WEIGHT: 188 LBS | BODY MASS INDEX: 26.32 KG/M2

## 2025-02-05 DIAGNOSIS — M25.511 CHRONIC RIGHT SHOULDER PAIN: Primary | ICD-10-CM

## 2025-02-05 DIAGNOSIS — M75.01 ADHESIVE CAPSULITIS OF RIGHT SHOULDER: Primary | ICD-10-CM

## 2025-02-05 DIAGNOSIS — G89.29 CHRONIC RIGHT SHOULDER PAIN: Primary | ICD-10-CM

## 2025-02-05 PROCEDURE — 97110 THERAPEUTIC EXERCISES: CPT

## 2025-02-05 PROCEDURE — 99024 POSTOP FOLLOW-UP VISIT: CPT | Performed by: ORTHOPAEDIC SURGERY

## 2025-02-05 RX ORDER — MELOXICAM 15 MG/1
15 TABLET ORAL DAILY
Qty: 30 TABLET | Refills: 0 | Status: SHIPPED | OUTPATIENT
Start: 2025-02-05

## 2025-02-05 RX ORDER — METHYLPREDNISOLONE 4 MG/1
TABLET ORAL
Qty: 1 EACH | Refills: 0 | Status: SHIPPED | OUTPATIENT
Start: 2025-02-05

## 2025-02-05 NOTE — PROGRESS NOTES
"Daily Note     Today's date: 2025  Patient name: Tony Ramsay  : 1970  MRN: [id number]  Referring provider: Bryaden Dos Santos MD  Dx:   Encounter Diagnosis     ICD-10-CM    1. Chronic right shoulder pain  M25.511     G89.29                      Subjective: Neck feels stuck, more painful after last session,  saw MD who wants him to drop down to 3x per week next week due to inflammaion.       Objective: See treatment diary below      Assessment: Tolerated treatment fair. Patient would benefit from continued PT to increase shoulder ROM. Decreased tissue extensibility of right upper trap and scalenes. Session shortened due to increasing pain levels with PROM. Is picking up new pain medications today.       Plan: Continue per plan of care.      Precautions: s/p manipulation under anesthesia   Past Medical History:   Diagnosis Date    Diabetes mellitus (HCC)     Gout     High cholesterol     Tendonitis          Insurance Eval/ Re-eval POC expires Auth Status Total visits  Start date  Expiration date Misc   CMS 1/24/25 3/21/25                    Date 1/30/25 1/31/25 2/3/25 2/4/25 2/5/25   Visit Number FOTO       Auth          Taping from A-P  Patient def taping TPR upper traps,    Manual   DN- see above     GH mobs  PROM 8'    Flexion 110  Abduction 85 PROM 15'  Flexion 115  Abduction 95 deg PROM 10'  PROM in side lying 15'   Tspine mobs P-A mobs tspine and CTJ gr 3-4 throughout Sidelying scapular mobilization Sidelying scapular mobilization     Scap mobs  STJ mobs in sidelying into retraction 3x30   Cervical retraction with extension 15x3\"    TPR                Neuro Re-ed        Shoulder isos Shoulder rolls x20        Scap retract  20x5\"   20x3\" 20 x 3\"   Serratus push up         Chin Tucks sitting 5s x 2x10    Supine 20x3\"        SOC x20             Thera Ex        Pulleys  4'  warm up           Prone punches with dowel 2x10         AAROM with dowel into flexion    Shoulder PROM    PROM 15' in supine " "and sitting      Seated thoracic extension with foam roller 2x10        TB shoulder ext        TB row         TB IR/ER         Wall slides  Towel slides into flexion and abduction 3\"x20 each        Prone I, Y, T        SL ER        pendulum        Thera Activity        Patient education Posture education  DN- education     Posture education                                                Modalities        Ice                               "

## 2025-02-05 NOTE — PROGRESS NOTES
Patient Name: Tony Ramsay      : 1970       MRN: [id number]   Encounter Provider: Brayden Dos Santos MD   Encounter Date: 25  Encounter department: [facility] [place]         Assessment & Plan  Adhesive capsulitis of right shoulder    Orders:  •  methylPREDNISolone 4 MG tablet therapy pack; Use as directed on package  •  meloxicam (Mobic) 15 mg tablet; Take 1 tablet (15 mg total) by mouth daily         Plan:   Tony's physical therapy reports were reviewed.  He is continuing to struggle.  I will place him on a Medrol Dosepak and provide additional prescription for meloxicam with a goal of decreasing his inflammation.  We did review his intraoperative pictures together.  He did have an extensive amount of adhesions and inflammation.  We discussed his history of diabetes is certainly contributing to this making it more of a difficult recovery.  While on the Medrol I asked that he closely monitor his blood glucose levels.  He verbalized understanding.  I will see him back in 3 to 4 weeks.  He will continue physical therapy but can decrease the visits to 3 times per week.  I encouraged him to keep up with his home exercises.    Follow-up:  3-4 weeks    _____________________________________________________  CHIEF COMPLAINT:  Chief Complaint   Patient presents with   • Right Shoulder - Post-op         SUBJECTIVE:  Tony Ramsay is a 54 y.o. male who presents 2-week postop visit for the right shoulder arthroscopy with lysis of adhesions and manipulation under anesthesia.  Tony states he continues to struggle.  He is experiencing fairly persistent pain and stiffness.  Pain can be global and has been radiating into the trapezius and rhomboids in the late. 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  He is having difficulty sleeping and waking every 2-3 hours.  He has been heating and icing  the shoulder.  He states heating can cause an increase in his pain.  He is clearly frustrated.    PAST MEDICAL HISTORY:  Past Medical History:   Diagnosis Date   • Diabetes mellitus (HCC)    • Gout    • High cholesterol    • Tendonitis        PAST SURGICAL HISTORY:  Past Surgical History:   Procedure Laterality Date   • NO PAST SURGERIES     • [place] SURGICAL ARTHROSCOPY SHOULDER W/LSS&RESCJ ADS Right 1/23/2025    Procedure: ARTHROSCOPY SHOULDER, Lysis of Adhesion, Manipulation under anesthesia;  Surgeon: Brayden Dos Santos MD;  Location: [facility];  Service: Orthopedics       FAMILY HISTORY:  Family History   Family history unknown: Yes       SOCIAL HISTORY:  Social History     Tobacco Use   • Smoking status: Never     Passive exposure: Never   • Smokeless tobacco: Never   Vaping Use   • Vaping status: Never Used   Substance Use Topics   • Alcohol use: No   • Drug use: No       MEDICATIONS:    Current Outpatient Medications:   •  ALPHA LIPOIC ACID PO, Take by mouth, Disp: , Rfl:   •  aspirin 81 mg chewable tablet, Chew 1 tablet (81 mg total) 2 (two) times a day for 28 days, Disp: 56 tablet, Rfl: 0  •  Berberine Chloride (BERBERINE HCI PO), Take by mouth, Disp: , Rfl:   •  cholecalciferol (VITAMIN D3) 400 units tablet, Take 400 Units by mouth daily, Disp: , Rfl:   •  Continuous Glucose Sensor (FreeStyle Christian 2 Sensor) MISC, , Disp: , Rfl:   •  diclofenac (VOLTAREN) 75 mg EC tablet, Take 1 tablet (75 mg total) by mouth 2 (two) times a day, Disp: 60 tablet, Rfl: 0  •  ezetimibe (ZETIA) 10 mg tablet, Take 10 mg by mouth daily, Disp: , Rfl:   •  meloxicam (Mobic) 15 mg tablet, Take 1 tablet (15 mg total) by mouth daily, Disp: 30 tablet, Rfl: 0  •  methylPREDNISolone 4 MG tablet therapy pack, Use as directed on package, Disp: 1 each, Rfl: 0  •  omega-3-acid ethyl esters (LOVAZA) 1 g capsule, Take 1 g by mouth daily, Disp: , Rfl:   •  Synjardy XR 5-1000 MG TB24, , Disp: , Rfl:   •  traMADol (Ultram) 50 mg tablet, Take 1  "tablet (50 mg total) by mouth every 6 (six) hours as needed for moderate pain for up to 20 doses, Disp: 20 tablet, Rfl: 0  •  Turmeric 500 MG CAPS, Take by mouth, Disp: , Rfl:   •  vitamin B-12 (CYANOCOBALAMIN) 50 MCG tablet, Take by mouth daily, Disp: , Rfl:   •  ondansetron (ZOFRAN) 4 mg tablet, Take 1 tablet (4 mg total) by mouth every 8 (eight) hours as needed for nausea or vomiting (Patient not taking: Reported on 2/5/2025), Disp: 20 tablet, Rfl: 0    ALLERGIES:  No Known Allergies    LABS:  HgA1c:   Lab Results   Component Value Date    HGBA1C 7.0 (H) 01/06/2025     BMP:   Lab Results   Component Value Date    CALCIUM 9.0 01/06/2025    K 4.3 01/06/2025    CO2 28 01/06/2025     01/06/2025    BUN 19 01/06/2025    CREATININE 1.00 01/06/2025     CBC: No components found for: \"CBC\"    _____________________________________________________  Review of Systems   Constitutional:  Negative for chills, fever and unexpected weight change.   HENT:  Negative for hearing loss, nosebleeds and sore throat.    Eyes:  Negative for pain, redness and visual disturbance.   Respiratory:  Negative for cough, shortness of breath and wheezing.    Cardiovascular:  Negative for chest pain, palpitations and leg swelling.   Gastrointestinal:  Negative for abdominal pain, nausea and vomiting.   Endocrine: Negative for polyphagia and polyuria.   Genitourinary:  Negative for dysuria and hematuria.   Musculoskeletal:         See HPI   Skin:  Negative for rash and wound.   Neurological:  Negative for dizziness, numbness and headaches.   Psychiatric/Behavioral:  Negative for decreased concentration and suicidal ideas. The patient is not nervous/anxious.         Right Shoulder Exam     Range of Motion   Active abduction:  70   Passive abduction:  90   External rotation:  10   Forward flexion:  90     Other   Sensation: normal  Pulse: present (2+ radial)             Physical Exam  Vitals reviewed.   Constitutional:       Appearance: He is " well-developed.   HENT:      Head: Normocephalic and atraumatic.   Eyes:      General:         Right eye: No discharge.         Left eye: No discharge.      Conjunctiva/sclera: Conjunctivae normal.   Cardiovascular:      Rate and Rhythm: Regular rhythm.   Pulmonary:      Effort: Pulmonary effort is normal. No respiratory distress.   Musculoskeletal:      Cervical back: Normal range of motion and neck supple.      Comments: As noted in the HPI.   Skin:     General: Skin is warm and dry.   Neurological:      Mental Status: He is alert and oriented to person, place, and time.   Psychiatric:         Behavior: Behavior normal.        _____________________________________________________  STUDIES REVIEWED:  I personally reviewed the images obtained in office today and my independent interpretation is as follows:  None      PROCEDURES PERFORMED:  None    Scribe Attestation    I,:  Ramon Da Silva MA am acting as a scribe while in the presence of the attending physician.:       I,:  Brayden Dos Santos MD personally performed the services described in this documentation    as scribed in my presence.:

## 2025-02-06 ENCOUNTER — APPOINTMENT (OUTPATIENT)
Dept: PHYSICAL THERAPY | Facility: CLINIC | Age: 55
End: 2025-02-06
Payer: COMMERCIAL

## 2025-02-07 ENCOUNTER — OFFICE VISIT (OUTPATIENT)
Dept: PHYSICAL THERAPY | Facility: CLINIC | Age: 55
End: 2025-02-07
Payer: COMMERCIAL

## 2025-02-07 DIAGNOSIS — G89.29 CHRONIC RIGHT SHOULDER PAIN: Primary | ICD-10-CM

## 2025-02-07 DIAGNOSIS — M25.511 CHRONIC RIGHT SHOULDER PAIN: Primary | ICD-10-CM

## 2025-02-07 PROCEDURE — 97110 THERAPEUTIC EXERCISES: CPT

## 2025-02-07 NOTE — ASSESSMENT & PLAN NOTE
Orders:  •  methylPREDNISolone 4 MG tablet therapy pack; Use as directed on package  •  meloxicam (Mobic) 15 mg tablet; Take 1 tablet (15 mg total) by mouth daily

## 2025-02-07 NOTE — PROGRESS NOTES
"Daily Note     Today's date: 2025  Patient name: Tony Ramsay  : 1970  MRN: [id number]  Referring provider: Brayden Dos Santos MD  Dx:   Encounter Diagnosis     ICD-10-CM    1. Chronic right shoulder pain  M25.511     G89.29           Start Time: 1100          Subjective: Reports pain subsided somewhat with the day off, certain movements still hurt.       Objective: See treatment diary below      Assessment: Tolerated treatment well. Patient would benefit from continued PT in order to increase ROM in right shoulder. Added in side lying ER which increased pain level. Patient will step down to 3 appts per week to help with inflammation control.       Plan: Continue per plan of care.      Precautions: s/p manipulation under anesthesia   Past Medical History:   Diagnosis Date    Diabetes mellitus (HCC)     Gout     High cholesterol     Tendonitis          Insurance Eval/ Re-eval POC expires Auth Status Total visits  Start date  Expiration date Misc   CMS 1/24/25 3/21/25                    Date 1/30/25 1/31/25 2/3/25 2/4/25 2/5/25 2/7/25   Visit Number FOTO        Auth           Taping from A-P  Patient def taping TPR upper traps,     Manual   DN- see above      GH mobs  PROM 8'    Flexion 110  Abduction 85 PROM 15'  Flexion 115  Abduction 95 deg PROM 10'  PROM in side lying 15' PROM in side lying 15'   Tspine mobs P-A mobs tspine and CTJ gr 3-4 throughout Sidelying scapular mobilization Sidelying scapular mobilization      Scap mobs  STJ mobs in sidelying into retraction 3x30   Cervical retraction with extension 15x3\"     TPR                  Neuro Re-ed      Peanut roll out on table 2\"   Shoulder isos Shoulder rolls x20      Towel slides on table 3\"   Scap retract  20x5\"   20x3\" 20 x 3\" 20x 3\"   Serratus push up          Chin Tucks sitting 5s x 2x10    Supine 20x3\"  Supine dowel ER 30x       SOC x20               Thera Ex         Pulleys  4'  warm up            Prone punches with dowel 2x10    Prone " "punches with dowel 2x10       AAROM with dowel into flexion  AAROM with dowel into flexion   Shoulder PROM    PROM 15' in supine and sitting       Seated thoracic extension with foam roller 2x10         TB shoulder ext         TB row          TB IR/ER          Wall slides  Towel slides into flexion and abduction 3\"x20 each         Prone I, Y, T         SL ER         pendulum         Thera Activity         Patient education Posture education  DN- education      Posture education                                                      Modalities         Ice                                  "

## 2025-02-11 ENCOUNTER — OFFICE VISIT (OUTPATIENT)
Dept: PHYSICAL THERAPY | Facility: CLINIC | Age: 55
End: 2025-02-11
Payer: COMMERCIAL

## 2025-02-11 DIAGNOSIS — M25.511 CHRONIC RIGHT SHOULDER PAIN: Primary | ICD-10-CM

## 2025-02-11 DIAGNOSIS — G89.29 CHRONIC RIGHT SHOULDER PAIN: Primary | ICD-10-CM

## 2025-02-11 PROCEDURE — 97110 THERAPEUTIC EXERCISES: CPT

## 2025-02-11 NOTE — PROGRESS NOTES
"Daily Note     Today's date: 2025  Patient name: Tony Ramsay  : 1970  MRN: [id number]  Referring provider: Brayden Dos Santos MD  Dx:   Encounter Diagnosis     ICD-10-CM    1. Chronic right shoulder pain  M25.511     G89.29                      Subjective: Reports 5/10 pain. More when sitting or laying down      Objective: See treatment diary below      Assessment: Tolerated treatment well. Patient would benefit from continued PT in order to increase ROM and strengthen right shoulder. Introduced pulleys in standing and wall slides in standing. Pain in sitting likely due to posture.       Plan: Continue per plan of care.      Precautions: s/p manipulation under anesthesia   Past Medical History:   Diagnosis Date    Diabetes mellitus (HCC)     Gout     High cholesterol     Tendonitis          Insurance Eval/ Re-eval POC expires Auth Status Total visits  Start date  Expiration date Misc   CMS 1/24/25 3/21/25                    Date 1/30/25 1/31/25 2/3/25 2/4/25 2/5/25 2/7/25 2/11/25   Visit Number FOTO         Auth            Taping from A-P  Patient def taping TPR upper traps,      Manual   DN- see above       GH mobs  PROM 8'    Flexion 110  Abduction 85 PROM 15'  Flexion 115  Abduction 95 deg PROM 10'  PROM in side lying 15' PROM in side lying 15' PROM in side lying / [id number]'   Tspine mobs P-A mobs tspine and CTJ gr 3-4 throughout Sidelying scapular mobilization Sidelying scapular mobilization       Scap mobs  STJ mobs in sidelying into retraction 3x30   Cervical retraction with extension 15x3\"   Cervical retraction with extension 15x3\" with therapist over pressure   TPR                    Neuro Re-ed      Peanut roll out on table 2\" Wall slides 20x   Shoulder isos Shoulder rolls x20      Towel slides on table 3\" Standing ext/ IR/ ER 20x RTB   Scap retract  20x5\"   20x3\" 20 x 3\" 20x 3\"    Serratus push up       Standing pulleys 3 min    Chin Tucks sitting 5s x 2x10    Supine 20x3\"  Supine dowel " "ER 30x Prone ER with cane 2 min       SOC x20                 Thera Ex          Pulleys  4'  warm up             Prone punches with dowel 2x10    Prone punches with dowel 2x10        AAROM with dowel into flexion  AAROM with dowel into flexion    Shoulder PROM    PROM 15' in supine and sitting        Seated thoracic extension with foam roller 2x10          TB shoulder ext          TB row           TB IR/ER           Wall slides  Towel slides into flexion and abduction 3\"x20 each          Prone I, Y, T          SL ER          pendulum          Thera Activity          Patient education Posture education  DN- education       Posture education                                                            Modalities          Ice                                     "

## 2025-02-13 ENCOUNTER — OFFICE VISIT (OUTPATIENT)
Dept: PHYSICAL THERAPY | Facility: CLINIC | Age: 55
End: 2025-02-13
Payer: COMMERCIAL

## 2025-02-13 DIAGNOSIS — M25.511 CHRONIC RIGHT SHOULDER PAIN: Primary | ICD-10-CM

## 2025-02-13 DIAGNOSIS — G89.29 CHRONIC RIGHT SHOULDER PAIN: Primary | ICD-10-CM

## 2025-02-13 PROCEDURE — 97140 MANUAL THERAPY 1/> REGIONS: CPT

## 2025-02-13 PROCEDURE — 97110 THERAPEUTIC EXERCISES: CPT

## 2025-02-13 NOTE — PROGRESS NOTES
"Daily Note     Today's date: 2025  Patient name: Tony Ramsay  : 1970  MRN: [id number]  Referring provider: Brayden Dos Santos MD  Dx:   Encounter Diagnosis     ICD-10-CM    1. Chronic right shoulder pain  M25.511     G89.29                      Subjective: Patient  notes increased pain today, even though he did ice prior to session. Finished steroid pack.       Objective: See treatment diary below      Assessment: Tolerated treatment poor- despite significant pain patient was able to perform most exercises, but requires still significant cueing for upright posture and proper scapular setting.  He seems to have more pain with cervical based interventions and may be mechanical in nature but patient is unable to progress further due to pain and so limited results can be interpreted. Patient would benefit from continued PT to continue with symptom management.       Plan: Continue per plan of care.      Precautions: s/p manipulation under anesthesia   Past Medical History:   Diagnosis Date    Diabetes mellitus (HCC)     Gout     High cholesterol     Tendonitis          Insurance Eval/ Re-eval POC expires Auth Status Total visits  Start date  Expiration date Misc   CMS 1/24/25 3/21/25                    Date 25   Visit Number        Auth         Patient def taping TPR upper traps,       Manual        GH mobs   PROM in side lying 15' PROM in side lying 15' PROM in side lying / [id number]' PROM in  supine-- and sitting 15'   Tspine mobs        Scap mobs  Cervical retraction with extension 15x3\"   Cervical retraction with extension 15x3\" with therapist over pressure Supine cervical retr into ext repeated    Supine rep R lateral flexion    TPR     R UT ~3'           Neuro Re-ed   Peanut roll out on table 2\" Wall slides 20x Wall slides 20x   Shoulder isos   Towel slides on table 3\" Standing ext/ IR/ ER 20x RTB Standing ext/ IR/ ER 20x RTB   Scap retract  20x3\" 20 x 3\" 20x 3\"  " "Rows pink tubing 2x10           Serratus push up    Standing pulleys 3 min Standing pulleys 3 min    Supine 20x3\"  Supine dowel ER 30x Prone ER with cane 2 min     SOC x20     SOC x20         Cervical retr x20x3\"   Thera Ex        Pulleys          Prone punches with dowel 2x10    Prone punches with dowel 2x10  Chest press with cane      AAROM with dowel into flexion  AAROM with dowel into flexion  AAROM with dowel into flexion   Shoulder PROM PROM 15' in supine and sitting                TB shoulder ext        TB row      Prone row NV?   TB IR/ER         Wall slides         Prone I, Y, T     Prone I's NV?   SL ER        pendulum        Thera Activity        Patient education        Posture education                                                Modalities        Ice                                     "

## 2025-02-14 ENCOUNTER — OFFICE VISIT (OUTPATIENT)
Dept: PHYSICAL THERAPY | Facility: CLINIC | Age: 55
End: 2025-02-14
Payer: COMMERCIAL

## 2025-02-14 DIAGNOSIS — G89.29 CHRONIC RIGHT SHOULDER PAIN: Primary | ICD-10-CM

## 2025-02-14 DIAGNOSIS — M25.511 CHRONIC RIGHT SHOULDER PAIN: Primary | ICD-10-CM

## 2025-02-14 PROCEDURE — 97140 MANUAL THERAPY 1/> REGIONS: CPT

## 2025-02-14 PROCEDURE — 97110 THERAPEUTIC EXERCISES: CPT

## 2025-02-14 NOTE — PROGRESS NOTES
"Daily Note     Today's date: 2025  Patient name: Tony Ramsay  : 1970  MRN: [id number]  Referring provider: Brayden Dos Santos MD  Dx:   Encounter Diagnosis     ICD-10-CM    1. Chronic right shoulder pain  M25.511     G89.29                      Subjective: Slept a little better last night than he has been      Objective: See treatment diary below      Assessment: Tolerated treatment well. Patient would benefit from continued PT in order to improve ROM and strength of right shoulder. Trialed HP at patient suggestion. Decreased pain levels after cervical distraction. Still empty end feel with PROM, exercises limited by pain. Start net session with HP.       Plan: Continue per plan of care.      Precautions: s/p manipulation under anesthesia   Past Medical History:   Diagnosis Date    Diabetes mellitus (HCC)     Gout     High cholesterol     Tendonitis          Insurance Eval/ Re-eval POC expires Auth Status Total visits  Start date  Expiration date Misc   CMS 1/24/25 3/21/25                    Date 25   Visit Number         Auth          Patient def taping TPR upper traps,     Cervical distraction 3*30 sec   Manual         GH mobs   PROM in side lying 15' PROM in side lying 15' PROM in side lying / [id number]' PROM in  supine-- and sitting 15' PROM in  supine-- and sitting 15'   Tspine mobs         Scap mobs  Cervical retraction with extension 15x3\"   Cervical retraction with extension 15x3\" with therapist over pressure Supine cervical retr into ext repeated    Supine rep R lateral flexion  Cervical Gr V mobilization with pt permission   TPR     R UT ~3'             Neuro Re-ed   Peanut roll out on table 2\" Wall slides 20x Wall slides 20x Wall slides 20x   Shoulder isos   Towel slides on table 3\" Standing ext/ IR/ ER 20x RTB Standing ext/ IR/ ER 20x RTB    Scap retract  20x3\" 20 x 3\" 20x 3\"  Rows pink tubing 2x10 Rows pink tubing 2x10            Serratus push " "up    Standing pulleys 3 min Standing pulleys 3 min Seated pulleys 3 min    Supine 20x3\"  Supine dowel ER 30x Prone ER with cane 2 min      SOC x20     SOC x20          Cervical retr x20x3\" Cervical retr x20x3\"   Thera Ex         Pulleys           Prone punches with dowel 2x10    Prone punches with dowel 2x10  Chest press with cane       AAROM with dowel into flexion  AAROM with dowel into flexion  AAROM with dowel into flexion    Shoulder PROM PROM 15' in supine and sitting                  TB shoulder ext         TB row      Prone row NV?    TB IR/ER          Wall slides          Prone I, Y, T     Prone I's NV?    SL ER         pendulum         Thera Activity         Patient education         Posture education                                                      Modalities         Ice      HP 10'                                  "

## 2025-02-17 ENCOUNTER — OFFICE VISIT (OUTPATIENT)
Dept: PHYSICAL THERAPY | Facility: CLINIC | Age: 55
End: 2025-02-17
Payer: COMMERCIAL

## 2025-02-17 DIAGNOSIS — G89.29 CHRONIC RIGHT SHOULDER PAIN: Primary | ICD-10-CM

## 2025-02-17 DIAGNOSIS — M25.511 CHRONIC RIGHT SHOULDER PAIN: Primary | ICD-10-CM

## 2025-02-17 PROCEDURE — 97140 MANUAL THERAPY 1/> REGIONS: CPT

## 2025-02-17 PROCEDURE — 97110 THERAPEUTIC EXERCISES: CPT

## 2025-02-17 NOTE — PROGRESS NOTES
"Daily Note     Today's date: 2025  Patient name: Tony Ramsay  : 1970  MRN: [id number]  Referring provider: Brayden Dos Santos MD  Dx:   Encounter Diagnosis     ICD-10-CM    1. Chronic right shoulder pain  M25.511     G89.29           Subjective: Patient notes no major changes in symptoms, had mild relief after  session but lasted for only 2 hours. Continuously using ice pack for pain relief.       Objective: See treatment diary below      Assessment: Tolerated treatment fair- patient is mechanically inconsistently responsive to interventions of the cervical spine in his shoulder pain. Given the presentation of his lateral shoulder/deltoid pain, initiated C4-C6 mobilizations which resulted in no changes. He did have mild relief with R rotation and extension. Patient is still well below functional baseline and his pain is the main  to poor progression.    Patient would benefit from continued PT to continue with symptom management.       Plan: Continue per plan of care.      Precautions: s/p manipulation under anesthesia   Past Medical History:   Diagnosis Date    Diabetes mellitus (HCC)     Gout     High cholesterol     Tendonitis          Insurance Eval/ Re-eval POC expires Auth Status Total visits  Start date  Expiration date Misc   CMS 1/24/25 3/21/25                    Date 25    Visit Number           Auth            Patient def taping TPR upper traps,     Cervical distraction 3*30 sec     Manual           GH mobs   PROM in side lying 15' PROM in side lying 15' PROM in side lying / [id number]' PROM in  supine-- and sitting 15' PROM in  supine-- and sitting 15' PROM supine 10'    Tspine mobs       Prone tspine gr 5 manip x3 plus gr 4 mobs throughout    Scap mobs  Cervical retraction with extension 15x3\"   Cervical retraction with extension 15x3\" with therapist over pressure Supine cervical retr into ext repeated    Supine rep R lateral " "flexion  Cervical Gr V mobilization with pt permission Cervical mobs P-A and upglides C4-C6    TPR     R UT ~3'  1st rib mobs gr 4 3x30               Neuro Re-ed   Peanut roll out on table 2\" Wall slides 20x Wall slides 20x Wall slides 20x Wall slides and finger crawls x20 each    Shoulder isos   Towel slides on table 3\" Standing ext/ IR/ ER 20x RTB Standing ext/ IR/ ER 20x RTB      Scap retract  20x3\" 20 x 3\" 20x 3\"  Rows pink tubing 2x10 Rows pink tubing 2x10 Rows pink tubing 2x10               Serratus push up    Standing pulleys 3 min Standing pulleys 3 min Seated pulleys 3 min Seated pulleys 3 min     Supine 20x3\"  Supine dowel ER 30x Prone ER with cane 2 min   ER/IR pink tubing x30 each      SOC x20     SOC x20            Cervical retr x20x3\" Cervical retr x20x3\" Cervical retr x20x3\"    Thera Ex           Pulleys             Prone punches with dowel 2x10    Prone punches with dowel 2x10  Chest press with cane x20         AAROM with dowel into flexion  AAROM with dowel into flexion  AAROM with dowel into flexion      Shoulder PROM PROM 15' in supine and sitting                      TB shoulder ext           TB row      Prone row NV?  Prone row 2x10     TB IR/ER            Wall slides            Prone I, Y, T     Prone I's NV?  Prone I's 2x10     SL ER           pendulum           Thera Activity           Patient education           Posture education                                                                  Modalities           Ice      HP 10'                                      "

## 2025-02-20 ENCOUNTER — OFFICE VISIT (OUTPATIENT)
Dept: PHYSICAL THERAPY | Facility: CLINIC | Age: 55
End: 2025-02-20
Payer: COMMERCIAL

## 2025-02-20 DIAGNOSIS — M25.511 CHRONIC RIGHT SHOULDER PAIN: Primary | ICD-10-CM

## 2025-02-20 DIAGNOSIS — G89.29 CHRONIC RIGHT SHOULDER PAIN: Primary | ICD-10-CM

## 2025-02-20 PROCEDURE — 97112 NEUROMUSCULAR REEDUCATION: CPT

## 2025-02-20 PROCEDURE — 97110 THERAPEUTIC EXERCISES: CPT

## 2025-02-20 NOTE — PROGRESS NOTES
"Daily Note     Today's date: 2025  Patient name: Tony Ramsay  : 1970  MRN: [id number]  Referring provider: Brayden Dos Santos MD  Dx:   Encounter Diagnosis     ICD-10-CM    1. Chronic right shoulder pain  M25.511     G89.29           Subjective: Patient reports that the shoulder is \"hurting\", has been doing all HEP.       Objective: See treatment diary below    AROM  Flexion: 100  Abduction 90  ER: 40    PROM:  Flexion 120  Abduction 90  ER: 45    Assessment: Tolerated treatment poor. Initiated UBE today which patient had significant pain with even with slow pacing and doing most effort with LUE. He demonstrated poor scap mechanics today with resisted exercises and so stopped  to focus on AAROM today. Patient was encouraged to call MD to get an earlier appt as he is not tolerating PT well since BRUNO. All his PROM end feels are empty, and he has considerable muscle guarding with all PROM of the shoulder and the elbow. Patient exhibited good technique with therapeutic exercises and would benefit from continued PT to continue with return to function.       Plan: Continue per plan of care.      Precautions: s/p manipulation under anesthesia   Past Medical History:   Diagnosis Date    Diabetes mellitus (HCC)     Gout     High cholesterol     Tendonitis          Insurance Eval/ Re-eval POC expires Auth Status Total visits  Start date  Expiration date Misc   CMS 1/24/25 3/21/25                    Date 25   Visit Number        Auth           Cervical distraction 3*30 sec     Manual        GH mobs  PROM in side lying / [id number]' PROM in  supine-- and sitting 15' PROM in  supine-- and sitting 15' PROM supine 10' PROM supine 8'   Tspine mobs    Prone tspine gr 5 manip x3 plus gr 4 mobs throughout    Scap mobs  Cervical retraction with extension 15x3\" with therapist over pressure Supine cervical retr into ext repeated    Supine rep R lateral flexion  Cervical Gr V " "mobilization with pt permission Cervical mobs P-A and upglides C4-C6    TPR  R UT ~3'  1st rib mobs gr 4 3x30            Neuro Re-ed Wall slides 20x Wall slides 20x Wall slides 20x Wall slides and finger crawls x20 each    Scap retract   Rows pink tubing 2x10 Rows pink tubing 2x10 Rows pink tubing 2x10 Rows pink 2x10            Pulleys Standing pulleys 3 min Standing pulleys 3 min Seated pulleys 3 min Seated pulleys 3 min Seated pulleys 3 min    Prone ER with cane 2 min   ER/IR pink tubing x30 each  ER/IR x20  pink tubing     SOC x20         Cervical retr x20x3\" Cervical retr x20x3\" Cervical retr x20x3\"    Thera Ex        Active warmup     UBE 2'/2'   AAROM  Chest press with cane x20     Cane abduction standing 2x10     Rep cane extension 2x10     Supine flexion with cane x20    Chest press with cane x20       AAROM with dowel into flexion   Bicep curls supinated 3# db 2x10     Brachioradialis  2x10   Shoulder PROM                TB shoulder ext        TB row   Prone row NV?  Prone row 2x10     TB IR/ER         Wall slides         Prone I, Y, T  Prone I's NV?  Prone I's 2x10     SL ER        pendulum        Thera Activity        Patient education        Posture education                                                Modalities        Ice   HP 10'                                        "

## 2025-02-24 ENCOUNTER — OFFICE VISIT (OUTPATIENT)
Dept: PHYSICAL THERAPY | Facility: CLINIC | Age: 55
End: 2025-02-24
Payer: COMMERCIAL

## 2025-02-24 DIAGNOSIS — G89.29 CHRONIC RIGHT SHOULDER PAIN: Primary | ICD-10-CM

## 2025-02-24 DIAGNOSIS — M25.511 CHRONIC RIGHT SHOULDER PAIN: Primary | ICD-10-CM

## 2025-02-24 PROCEDURE — 97112 NEUROMUSCULAR REEDUCATION: CPT

## 2025-02-24 PROCEDURE — 97110 THERAPEUTIC EXERCISES: CPT

## 2025-02-24 NOTE — PROGRESS NOTES
"Daily Note     Today's date: 2025  Patient name: Tony Ramsay  : 1970  MRN: [id number]  Referring provider: Brayden Dos Santos MD  Dx:   Encounter Diagnosis     ICD-10-CM    1. Chronic right shoulder pain  M25.511     G89.29           Subjective: Patient  reports minor improvement in pain, he had on and off throbbing pain. Currently reporting 3/10 \"annoying\" pain      Objective: See treatment diary below      Assessment: Tolerated treatment fair- he had improved tolerance to exercise today- did try and progress some exercises and introduce D1/D2 movement patterns but pt was unable to tolerate those. He was unable to perform prone T's with proper scap mechanics despite verbal and tactile cues. He notes feeling like the shoulder \"is not ready\" for strengthening. Patient was encouraged to continue with HEP. Patient demonstrated fatigue post treatment, exhibited good technique with therapeutic exercises, and would benefit from continued PT to continue with return to function.       Plan: Continue per plan of care.      Precautions: s/p manipulation under anesthesia   Past Medical History:   Diagnosis Date    Diabetes mellitus (HCC)     Gout     High cholesterol     Tendonitis          Insurance Eval/ Re-eval POC expires Auth Status Total visits  Start date  Expiration date Misc   CMS 1/24/25 3/21/25                    Date 25   Visit Number      16   Auth            Cervical distraction 3*30 sec      Manual         GH mobs  PROM in side lying / [id number]' PROM in  supine-- and sitting 15' PROM in  supine-- and sitting 15' PROM supine 10' PROM supine 8' PROM supine 8'   Tspine mobs    Prone tspine gr 5 manip x3 plus gr 4 mobs throughout     Scap mobs  Cervical retraction with extension 15x3\" with therapist over pressure Supine cervical retr into ext repeated    Supine rep R lateral flexion  Cervical Gr V mobilization with pt permission Cervical mobs P-A and " "upglides C4-C6     TPR  R UT ~3'  1st rib mobs gr 4 3x30              Neuro Re-ed Wall slides 20x Wall slides 20x Wall slides 20x Wall slides and finger crawls x20 each     Scap retract   Rows pink tubing 2x10 Rows pink tubing 2x10 Rows pink tubing 2x10 Rows pink 2x10  Rows purple 3x10             Pulleys Standing pulleys 3 min Standing pulleys 3 min Seated pulleys 3 min Seated pulleys 3 min Seated pulleys 3 min def    Prone ER with cane 2 min   ER/IR pink tubing x30 each  ER/IR x20  pink tubing ER/IR x20  pink tubing     SOC x20          Cervical retr x20x3\" Cervical retr x20x3\" Cervical retr x20x3\"  Cervical retr x20x3\"   Thera Ex         Active warmup     UBE 2'/2'    AAROM  Chest press with cane x20     Cane abduction standing 2x10     Rep cane extension 2x10     Supine flexion with cane x20    Chest press with cane x20   Cane abduction standing 2x10     Rep cane extension 2x10     Supine flexion with cane x20    Chest press with cane x20     AAROM with dowel into flexion   Bicep curls supinated 3# db 2x10     Brachioradialis  2x10 Bicep curls supinated 3# db 2x10     Brachioradialis  2x10   Shoulder PROM      CC series:  High row 7.5# 2x10  Attempted D2/D1              TB shoulder ext         TB row   Prone row NV?  Prone row 2x10   Bent over row with 3# 2x10    TB IR/ER          Wall slides       Wall standing W's with 1/2 foam roller 2x10    Prone I, Y, T  Prone I's NV?  Prone I's 2x10   Prone I's and T's 1x8 each    SL ER         pendulum         Thera Activity         Patient education         Posture education                                                      Modalities         Ice   HP 10'        \         "

## 2025-02-26 ENCOUNTER — OFFICE VISIT (OUTPATIENT)
Dept: PHYSICAL THERAPY | Facility: CLINIC | Age: 55
End: 2025-02-26
Payer: COMMERCIAL

## 2025-02-26 DIAGNOSIS — M25.511 CHRONIC RIGHT SHOULDER PAIN: Primary | ICD-10-CM

## 2025-02-26 DIAGNOSIS — G89.29 CHRONIC RIGHT SHOULDER PAIN: Primary | ICD-10-CM

## 2025-02-26 PROCEDURE — 97112 NEUROMUSCULAR REEDUCATION: CPT | Performed by: PHYSICAL THERAPIST

## 2025-02-26 PROCEDURE — 97110 THERAPEUTIC EXERCISES: CPT | Performed by: PHYSICAL THERAPIST

## 2025-02-26 NOTE — PROGRESS NOTES
"Daily Note     Today's date: 2025  Patient name: Tony Ramsay  : 1970  MRN: [id number]  Referring provider: Brayden Dos Santos MD  Dx:   Encounter Diagnosis     ICD-10-CM    1. Chronic right shoulder pain  M25.511     G89.29           Subjective: Patient  reports pain is 2/10 or 3/10 today.      Objective: See treatment diary below      Assessment: Tolerated treatment fair-  He has remaining restrictions with T-S posterior to anterior glides.  He has some soreness with the glides especially upper T-S segments.       Plan:   Continue per plan of care developed by primary PT.          Precautions: s/p manipulation under anesthesia   Past Medical History:   Diagnosis Date    Diabetes mellitus (HCC)     Gout     High cholesterol     Tendonitis          Insurance Eval/ Re-eval POC expires Auth Status Total visits  Start date  Expiration date Misc   CMS 1/24/25 3/21/25                    Date 25   Visit Number    [id number] 17   Auth         Cervical distraction 3*30 sec       Manual        GH mobs  PROM in  supine-- and sitting 15' PROM supine 10' PROM supine 8' PROM supine 8' 6 min   Tspine mobs  Prone tspine gr 5 manip x3 plus gr 4 mobs throughout   2 min P to A mobs   Scap mobs  Cervical Gr V mobilization with pt permission Cervical mobs P-A and upglides C4-C6      TPR  1st rib mobs gr 4 3x30              Neuro Re-ed Wall slides 20x Wall slides and finger crawls x20 each   Wall slide w towel holding iso ER  20x   Scap retract  Rows pink tubing 2x10 Rows pink tubing 2x10 Rows pink 2x10  Rows purple 3x10  Purple  30           Pulleys Seated pulleys 3 min Seated pulleys 3 min Seated pulleys 3 min def 2 mn     ER/IR pink tubing x30 each  ER/IR x20  pink tubing ER/IR x20  pink tubing ER/IR x20  pink tubing            Cervical retr x20x3\" Cervical retr x20x3\"  Cervical retr x20x3\" 20x  5\"   Thera Ex        Active warmup   UBE 2'/2'     AAROM   Cane abduction standing 2x10 "     Rep cane extension 2x10     Supine flexion with cane x20    Chest press with cane x20   Cane abduction standing 2x10     Rep cane extension 2x10     Supine flexion with cane x20    Chest press with cane x20 Cane abduction standing 2x10     Rep cane extension 2x10     Supine flexion with cane x20    Chest press with cane x20      Bicep curls supinated 3# db 2x10     Brachioradialis  2x10 Bicep curls supinated 3# db 2x10     Brachioradialis  2x10 Bicep curls supinated 3# db 2x10     Brachioradialis  2x10   Shoulder PROM    CC series:  High row 7.5# 2x10  Attempted D2/D1   CC   High row 7.5# 2x10           TB shoulder ext        TB row   Prone row 2x10   Bent over row with 3# 2x10  3#  20   TB IR/ER         Wall slides     Wall standing W's with 1/2 foam roller 2x10     Prone I, Y, T  Prone I's 2x10   Prone I's and T's 1x8 each     SL ER        pendulum        Thera Activity        Patient education        Posture education                                                Modalities        Ice  10'         \

## 2025-02-27 ENCOUNTER — OFFICE VISIT (OUTPATIENT)
Dept: PHYSICAL THERAPY | Facility: CLINIC | Age: 55
End: 2025-02-27
Payer: COMMERCIAL

## 2025-02-27 DIAGNOSIS — M25.511 CHRONIC RIGHT SHOULDER PAIN: Primary | ICD-10-CM

## 2025-02-27 DIAGNOSIS — G89.29 CHRONIC RIGHT SHOULDER PAIN: Primary | ICD-10-CM

## 2025-02-27 PROCEDURE — 97530 THERAPEUTIC ACTIVITIES: CPT | Performed by: PHYSICAL THERAPIST

## 2025-02-27 PROCEDURE — 97110 THERAPEUTIC EXERCISES: CPT | Performed by: PHYSICAL THERAPIST

## 2025-02-27 NOTE — PROGRESS NOTES
Daily Note     Today's date: 2025  Patient name: Tony Ramsay  : 1970  MRN: [id number]  Referring provider: Brayden Dos Santos MD  Dx:   Encounter Diagnosis     ICD-10-CM    1. Chronic right shoulder pain  M25.511     G89.29             Subjective: Patient  reports pain is 3-4/10 today but varies in location from front of shoulder to lateral aspect of shoulder and then to periscap region, occass in all locations, more often in one versus the others.       Objective: See treatment diary below. Time spent assessing shoulder mobility following thoracic spine interventions. AROM measurements limited in both shoulders (self limits L shoulder to match R) however cueing to inc L shoulder mobility creates inc in R shoulder mobility. R shoulder flexion 85 deg, abd 75 deg, reported pain. Flex and abd MMT giveway strength (+).  Seated posture very poor with major R shoulder depression compared to L. Unable to correct posture indep without manual cueing. Narayan loss throughout thoracic spine AROM. Rep thoracic ext over fulcrum inc R shldr flexion 105 deg, ongoing pain. Post repeated motions trace shoulder depression noted.       Assessment: Tolerated treatment fair-  Pt shoulder demo deficits throughout however mild correlation between thoracic spine and symptoms are being tested via HEP at this time prior to pt return to MD on Tuesday. Overall his postural deficits impact his AROM, AROM improves albeit mildly with posture correction. He also demo slight inc in AROM following thoracic based stretching. His symptoms change and vary in location and vary whether loaded or unloaded. This is often less indicative of shoulder pathology. Pt demo very limited activity tolerance and ongoing encouragement to complete short sets of therex. Prognosis is very limited but information related to thoracic spine repeated motions may be helpful in confirming location and prognosis of treatment diagnosis.       Plan:   F/u with MD  "next week, pt encouraged to please call PT after appt to determine need for f/u.          Precautions: s/p manipulation under anesthesia   Past Medical History:   Diagnosis Date    Diabetes mellitus (HCC)     Gout     High cholesterol     Tendonitis          Insurance Eval/ Re-eval POC expires Auth Status Total visits  Start date  Expiration date Misc   CMS 1/24/25 3/21/25                    Date 2/20/25 2/24/25 2/26/25 2/27/25   Visit Number  16 17 18   Auth              Manual       GH mobs  PROM supine 8' PROM supine 8' 6 min    Tspine mobs   2 min P to A mobs    Scap mobs        TPR              Neuro Re-ed   Wall slide w towel holding iso ER  20x Wall slide 3x10   Scap retract  Rows pink 2x10  Rows purple 3x10  Purple  30           Pulleys Seated pulleys 3 min def 2 mn     ER/IR x20  pink tubing ER/IR x20  pink tubing ER/IR x20  pink tubing             Cervical retr x20x3\" 20x  5\"    Thera Ex       Active warmup UBE 2'/2'      AAROM Cane abduction standing 2x10     Rep cane extension 2x10     Supine flexion with cane x20    Chest press with cane x20   Cane abduction standing 2x10     Rep cane extension 2x10     Supine flexion with cane x20    Chest press with cane x20 Cane abduction standing 2x10     Rep cane extension 2x10     Supine flexion with cane x20    Chest press with cane x20     Bicep curls supinated 3# db 2x10     Brachioradialis  2x10 Bicep curls supinated 3# db 2x10     Brachioradialis  2x10 Bicep curls supinated 3# db 2x10     Brachioradialis  2x10    Shoulder PROM  CC series:  High row 7.5# 2x10  Attempted D2/D1   CC   High row 7.5# 2x10           TB shoulder ext       TB row   Bent over row with 3# 2x10  3#  20    TB IR/ER        Wall slides   Wall standing W's with 1/2 foam roller 2x10      Prone I, Y, T  Prone I's and T's 1x8 each      SL ER       pendulum       Thera Activity       Patient education       Posture education           Mechanical Reassessment 30'                      "          Modalities       Ice         \

## 2025-03-04 ENCOUNTER — OFFICE VISIT (OUTPATIENT)
Dept: OBGYN CLINIC | Facility: CLINIC | Age: 55
End: 2025-03-04

## 2025-03-04 VITALS — WEIGHT: 188 LBS | BODY MASS INDEX: 26.32 KG/M2 | HEIGHT: 71 IN

## 2025-03-04 DIAGNOSIS — Z98.890 S/P ARTHROSCOPY OF RIGHT SHOULDER: Primary | ICD-10-CM

## 2025-03-04 DIAGNOSIS — M75.01 ADHESIVE CAPSULITIS OF RIGHT SHOULDER: ICD-10-CM

## 2025-03-04 PROCEDURE — 99024 POSTOP FOLLOW-UP VISIT: CPT | Performed by: ORTHOPAEDIC SURGERY

## 2025-03-04 NOTE — PROGRESS NOTES
Patient Name: Tony Ramsay      : 1970       MRN: [id number]   Encounter Provider: Brayden Dos Santos MD   Encounter Date: 25  Encounter department: [facility]         Assessment & Plan  S/P arthroscopy of right shoulder  Continue with meloxicam as prescribed  Continue with physical therapy, can reduce to 1 or 2 times weekly if compliant with HEP  Orders:  •  Ambulatory Referral to Physical Therapy; Future    Adhesive capsulitis of right shoulder    Orders:  •  Ambulatory Referral to Physical Therapy; Future       Plan:  Tony is a 54 y.o. male who presents now 6 weeks S/P right shoulder arthroscopy with lysis of adhesions and manipulation under anesthesia. I am pleased with his clinical exam and review of physical therapy notes. His active range of motion has improved since previous exam. I encourage him to continue his efforts in PT and with HEP. Continue with Meloxicam as prescribed. We discussed his history of diabetes is certainly contributing to this making it more of a difficult recovery. We will see him back in clinic in 4-6 weeks.     Follow-up:  Return in about 6 weeks (around 4/15/2025).     _____________________________________________________  CHIEF COMPLAINT:  Chief Complaint   Patient presents with   • Right Shoulder - Post-op         SUBJECTIVE:  Tony Ramsay is a 54 y.o. male who presents 6 weeks S/P right shoulder arthroscopy with lysis of adhesions and manipulation under anesthesia. DOS 25. Patient notes continued improvements in his range of motion and rates his pain 3/10. He continues his efforts in physical therapy and at home.     PAST MEDICAL HISTORY:  Past Medical History:   Diagnosis Date   • Diabetes mellitus (HCC)    • Gout    • High cholesterol    • Tendonitis        PAST SURGICAL HISTORY:  Past Surgical History:   Procedure Laterality Date   • NO PAST SURGERIES     • OR SURGICAL ARTHROSCOPY SHOULDER W/[id number]&[id number] ADS Right  1/23/2025    Procedure: ARTHROSCOPY SHOULDER, Lysis of Adhesion, Manipulation under anesthesia;  Surgeon: Brayden Dos Santos MD;  Location: [facility];  Service: Orthopedics       FAMILY HISTORY:  Family History   Family history unknown: Yes       SOCIAL HISTORY:  Social History     Tobacco Use   • Smoking status: Never     Passive exposure: Never   • Smokeless tobacco: Never   Vaping Use   • Vaping status: Never Used   Substance Use Topics   • Alcohol use: No   • Drug use: No       MEDICATIONS:    Current Outpatient Medications:   •  ALPHA LIPOIC ACID PO, Take by mouth, Disp: , Rfl:   •  Berberine Chloride (BERBERINE HCI PO), Take by mouth, Disp: , Rfl:   •  cholecalciferol (VITAMIN D3) 400 units tablet, Take 400 Units by mouth daily, Disp: , Rfl:   •  Continuous Glucose Sensor (FreeStyle Christian 2 Sensor) MISC, , Disp: , Rfl:   •  diclofenac (VOLTAREN) 75 mg EC tablet, Take 1 tablet (75 mg total) by mouth 2 (two) times a day, Disp: 60 tablet, Rfl: 0  •  ezetimibe (ZETIA) 10 mg tablet, Take 10 mg by mouth daily, Disp: , Rfl:   •  meloxicam (Mobic) 15 mg tablet, Take 1 tablet (15 mg total) by mouth daily, Disp: 30 tablet, Rfl: 0  •  omega-3-acid ethyl esters (LOVAZA) 1 g capsule, Take 1 g by mouth daily, Disp: , Rfl:   •  Synjardy XR 5-1000 MG TB24, , Disp: , Rfl:   •  Turmeric 500 MG CAPS, Take by mouth, Disp: , Rfl:   •  vitamin B-12 (CYANOCOBALAMIN) 50 MCG tablet, Take by mouth daily, Disp: , Rfl:   •  aspirin 81 mg chewable tablet, Chew 1 tablet (81 mg total) 2 (two) times a day for 28 days, Disp: 56 tablet, Rfl: 0  •  methylPREDNISolone 4 MG tablet therapy pack, Use as directed on package (Patient not taking: Reported on 3/4/2025), Disp: 1 each, Rfl: 0  •  ondansetron (ZOFRAN) 4 mg tablet, Take 1 tablet (4 mg total) by mouth every 8 (eight) hours as needed for nausea or vomiting (Patient not taking: Reported on 3/4/2025), Disp: 20 tablet, Rfl: 0  •  traMADol (Ultram) 50 mg tablet, Take 1 tablet (50 mg total) by  "mouth every 6 (six) hours as needed for moderate pain for up to 20 doses (Patient not taking: Reported on 3/4/2025), Disp: 20 tablet, Rfl: 0    ALLERGIES:  No Known Allergies    LABS:  HgA1c:   Lab Results   Component Value Date    HGBA1C 7.0 (H) 01/06/2025     BMP:   Lab Results   Component Value Date    CALCIUM 9.0 01/06/2025    K 4.3 01/06/2025    CO2 28 01/06/2025     01/06/2025    BUN 19 01/06/2025    CREATININE 1.00 01/06/2025     CBC: No components found for: \"CBC\"    _____________________________________________________  Review of Systems   Constitutional:  Negative for chills and fever.   HENT:  Negative for ear pain and sore throat.    Eyes:  Negative for pain and visual disturbance.   Respiratory:  Negative for cough and shortness of breath.    Cardiovascular:  Negative for chest pain and palpitations.   Gastrointestinal:  Negative for abdominal pain and vomiting.   Genitourinary:  Negative for dysuria and hematuria.   Musculoskeletal:  Negative for arthralgias and back pain.   Skin:  Negative for color change and rash.   Neurological:  Negative for seizures and syncope.   All other systems reviewed and are negative.       Right Shoulder Exam     Tenderness   The patient is experiencing no tenderness.    Range of Motion   External rotation:  30   Forward flexion:  140   Internal rotation 0 degrees:  Sacrum     Other   Erythema: absent  Sensation: normal  Pulse: present    Comments:    Skin is warm and dry to touch with no signs of erythema, ecchymosis, or infection  Demonstrates normal elbow, wrist, and finger motion  2+  distal radial pulse with brisk capillary refill to the fingers  Radial, median, ulnar and motor  and sensory distribution intact  Sensation to light touch intact distally               Physical Exam  Vitals and nursing note reviewed.   Constitutional:       Appearance: Normal appearance.   HENT:      Head: Normocephalic and atraumatic.      Right Ear: External ear normal.      Left " Ear: External ear normal.   Eyes:      Extraocular Movements: Extraocular movements intact.      Conjunctiva/sclera: Conjunctivae normal.   Cardiovascular:      Rate and Rhythm: Normal rate.      Pulses: Normal pulses.   Pulmonary:      Effort: Pulmonary effort is normal.   Musculoskeletal:         General: Normal range of motion.      Cervical back: Normal range of motion and neck supple.      Comments: See ortho exam   Skin:     General: Skin is warm and dry.   Neurological:      General: No focal deficit present.      Mental Status: He is alert.   Psychiatric:         Behavior: Behavior normal.        _____________________________________________________  STUDIES REVIEWED:  I personally reviewed the pertinent images and my independent interpretation is as follows:  No new images obtained today    PROCEDURES PERFORMED:  No Procedures performed today    Scribe Attestation    I,:  Clare Watson am acting as a scribe while in the presence of the attending physician.:       I,:  Brayden Dos Santos MD personally performed the services described in this documentation    as scribed in my presence.:

## 2025-03-05 ENCOUNTER — OFFICE VISIT (OUTPATIENT)
Dept: PHYSICAL THERAPY | Facility: CLINIC | Age: 55
End: 2025-03-05
Payer: COMMERCIAL

## 2025-03-05 DIAGNOSIS — G89.29 CHRONIC RIGHT SHOULDER PAIN: Primary | ICD-10-CM

## 2025-03-05 DIAGNOSIS — M25.511 CHRONIC RIGHT SHOULDER PAIN: Primary | ICD-10-CM

## 2025-03-05 PROCEDURE — 97112 NEUROMUSCULAR REEDUCATION: CPT

## 2025-03-05 PROCEDURE — 97140 MANUAL THERAPY 1/> REGIONS: CPT

## 2025-03-05 NOTE — PROGRESS NOTES
"Daily Note     Today's date: 3/5/2025  Patient name: Tony Ramsay  : 1970  MRN: [id number]  Referring provider: Brayden Dos Santos MD  Dx:   Encounter Diagnosis     ICD-10-CM    1. Chronic right shoulder pain  M25.511     G89.29           Start Time: 1015  Stop Time: 1105  Total time in clinic (min): 50 minutes    Subjective: Reports shoulder is generally feeling better over the weekend, but this Morning he is feeling a little pain.       Objective: See treatment diary below      Assessment: Tolerated treatment fair. Patient would benefit from continued PT in order to increase right shoulder strength and and ROM. Added in more strengthening exercises today to include elbow 4 way. Seated thoracic ext with no change in pain levels. Self limiting with supine arm flexion with dowel. Pain in crease with self ER with dowel. Continue to progress strengthening.       Plan: Continue per plan of care.      Precautions: s/p manipulation under anesthesia   Past Medical History:   Diagnosis Date    Diabetes mellitus (HCC)     Gout     High cholesterol     Tendonitis          Insurance Eval/ Re-eval POC expires Auth Status Total visits  Start date  Expiration date Misc   CMS 1/24/25 3/21/25                    Date 2/20/25 2/24/25 2/26/25 2/27/25 3/5/25   Visit Number  16 17 18 19   Auth                Manual        GH mobs  PROM supine 8' PROM supine 8' 6 min  PROM 5'   Tspine mobs   2 min P to A mobs     Scap mobs         TPR                Neuro Re-ed   Wall slide w towel holding iso ER  20x Wall slide 3x10 Wall slide 3x10   Scap retract  Rows pink 2x10  Rows purple 3x10  Purple  30          Seated thoracic ext +1/2 roll 20x   Pulleys Seated pulleys 3 min def 2 mn  Ball shoulder 4 way 20x 5 sec hold    ER/IR x20  pink tubing ER/IR x20  pink tubing ER/IR x20  pink tubing  Standing plank hands on table - shoulder tap alt 20x        Supine arm flex with dowel 20x, ER 20x     Cervical retr x20x3\" 20x  5\"     Thera Ex   "      Active warmup UBE 2'/2'       AAROM Cane abduction standing 2x10     Rep cane extension 2x10     Supine flexion with cane x20    Chest press with cane x20   Cane abduction standing 2x10     Rep cane extension 2x10     Supine flexion with cane x20    Chest press with cane x20 Cane abduction standing 2x10     Rep cane extension 2x10     Supine flexion with cane x20    Chest press with cane x20      Bicep curls supinated 3# db 2x10     Brachioradialis  2x10 Bicep curls supinated 3# db 2x10     Brachioradialis  2x10 Bicep curls supinated 3# db 2x10     Brachioradialis  2x10     Shoulder PROM  CC series:  High row 7.5# 2x10  Attempted D2/D1   CC   High row 7.5# 2x10             TB shoulder ext        TB row   Bent over row with 3# 2x10  3#  20     TB IR/ER         Wall slides   Wall standing W's with 1/2 foam roller 2x10       Prone I, Y, T  Prone I's and T's 1x8 each       SL ER        pendulum        Thera Activity        Patient education        Posture education            Mechanical Reassessment 30'                                    Modalities        Ice          \

## 2025-03-13 ENCOUNTER — OFFICE VISIT (OUTPATIENT)
Dept: PHYSICAL THERAPY | Facility: CLINIC | Age: 55
End: 2025-03-13
Payer: COMMERCIAL

## 2025-03-13 DIAGNOSIS — G89.29 CHRONIC RIGHT SHOULDER PAIN: Primary | ICD-10-CM

## 2025-03-13 DIAGNOSIS — M25.511 CHRONIC RIGHT SHOULDER PAIN: Primary | ICD-10-CM

## 2025-03-13 PROCEDURE — 97110 THERAPEUTIC EXERCISES: CPT

## 2025-03-13 NOTE — PROGRESS NOTES
"Daily Note     Today's date: 3/13/2025  Patient name: Tony Ramsay  : 1970  MRN: [id number]  Referring provider: Brayden Dos Santos MD  Dx:   Encounter Diagnosis     ICD-10-CM    1. Chronic right shoulder pain  M25.511     G89.29             Subjective: Patient notes feeling about 2-3 out of 10 pain today.       Objective: See treatment diary below      Assessment: Tolerated treatment fair- he was able to complete body blade today to enhance scapular stability with minimal pain. He continues to struggle with overhead motions but does attempt to push through tolerance at times. His main limiting factor continues to be pain.  Patient demonstrated fatigue post treatment and would benefit from continued PT to continue with return to function.       Plan: Continue per plan of care.      Precautions: s/p manipulation under anesthesia   Past Medical History:   Diagnosis Date    Diabetes mellitus (HCC)     Gout     High cholesterol     Tendonitis          Insurance Eval/ Re-eval POC expires Auth Status Total visits  Start date  Expiration date Misc   CMS 1/24/25 3/21/25                    Date 2/26/25 2/27/25 3/5/25 3/13/25   Visit Number 17 18 19 20   Auth              Manual       GH mobs  6 min  PROM 5'    Tspine mobs 2 min P to A mobs      Scap mobs        TPR              Neuro Re-ed Wall slide w towel holding iso ER  20x Wall slide 3x10 Wall slide 3x10 Wall slide 3x10   Scap retract  Purple  30   Purple rows 3x10       Seated thoracic ext +1/2 roll 20x    Pulleys 2 mn  Ball shoulder 4 way 20x 5 sec hold Ball shoulder 4 way 20x 5 sec hold    ER/IR x20  pink tubing  Standing plank hands on table - shoulder tap alt 20x Standing plank hands on table - shoulder tap alt 20x      Supine arm flex with dowel 20x, ER 20x Bicep curls 2# and 4# (L arm) 3x10     20x  5\"          4# db holds and gravity pu;ls x20        Body blade seated ER/IR 4x10\"    Arm straight slight abduction 3x10\"   Thera Ex       Active warmup  "   Side bending over swiss ball with RUE on ball x20    AAROM Cane abduction standing 2x10     Rep cane extension 2x10     Supine flexion with cane x20    Chest press with cane x20       Bicep curls supinated 3# db 2x10     Brachioradialis  2x10      Shoulder PROM CC   High row 7.5# 2x10             TB shoulder ext       TB row  3#  20      TB IR/ER        Wall slides        Prone I, Y, T       SL ER       pendulum       Thera Activity       Patient education       Posture education         Mechanical Reassessment 30'                                 Modalities       Ice         \

## 2025-03-17 ENCOUNTER — OFFICE VISIT (OUTPATIENT)
Dept: PHYSICAL THERAPY | Facility: CLINIC | Age: 55
End: 2025-03-17
Payer: COMMERCIAL

## 2025-03-17 DIAGNOSIS — M25.511 CHRONIC RIGHT SHOULDER PAIN: Primary | ICD-10-CM

## 2025-03-17 DIAGNOSIS — G89.29 CHRONIC RIGHT SHOULDER PAIN: Primary | ICD-10-CM

## 2025-03-17 PROCEDURE — 97110 THERAPEUTIC EXERCISES: CPT

## 2025-03-17 NOTE — PROGRESS NOTES
"Daily Note     Today's date: 3/17/2025  Patient name: Tony Ramsay  : 1970  MRN: [id number]  Referring provider: Brayden Dos Santos MD  Dx:   Encounter Diagnosis     ICD-10-CM    1. Chronic right shoulder pain  M25.511     G89.29           Start Time: 1058  Stop Time: 1145  Total time in clinic (min): 47 minutes    Subjective: No change since last in clinic      Objective: See treatment diary below      Assessment: Tolerated treatment well. Patient would benefit from continued PT in order to increase strength. Session today focused on strengthening in available ROM. AAROM by therapist for standing flexion and abd. Still limited by pain.       Plan: Continue per plan of care.      Precautions: s/p manipulation under anesthesia   Past Medical History:   Diagnosis Date    Diabetes mellitus (HCC)     Gout     High cholesterol     Tendonitis          Insurance Eval/ Re-eval POC expires Auth Status Total visits  Start date  Expiration date Misc   CMS 1/24/25 3/21/25                    Date 2/26/25 2/27/25 3/5/25 3/13/25 3/17/25   Visit Number 17 18 19 20 21   Auth                Manual        GH mobs  6 min  PROM 5'     Tspine mobs 2 min P to A mobs       Scap mobs         TPR                Neuro Re-ed Wall slide w towel holding iso ER  20x Wall slide 3x10 Wall slide 3x10 Wall slide 3x10 Wall slide 3x10   Scap retract  Purple  30   Purple rows 3x10  Wall press into foam roller with GTB pull apart 10x      Seated thoracic ext +1/2 roll 20x     Pulleys 2 mn  Ball shoulder 4 way 20x 5 sec hold Ball shoulder 4 way 20x 5 sec hold Ball shoulder 4 way 20x 5 sec hold    ER/IR x20  pink tubing  Standing plank hands on table - shoulder tap alt 20x Standing plank hands on table - shoulder tap alt 20x Standing plank hands on table - shoulder tap alt 20x      Supine arm flex with dowel 20x, ER 20x Bicep curls 2# and 4# (L arm) 3x10  Standing abd/ flex 1-2# AAROM 2*10    20x  5\"    Prone I, T 15x ea       4# db holds and " "gravity pu;ls x20         Body blade seated ER/IR 4x10\"    Arm straight slight abduction 3x10\"    Thera Ex        Active warmup    Side bending over swiss ball with RUE on ball x20  Peanut roll on table stretch   AAROM Cane abduction standing 2x10     Rep cane extension 2x10     Supine flexion with cane x20    Chest press with cane x20        Bicep curls supinated 3# db 2x10     Brachioradialis  2x10       Shoulder PROM CC   High row 7.5# 2x10               TB shoulder ext        TB row  3#  20       TB IR/ER         Wall slides         Prone I, Y, T        SL ER        pendulum        Thera Activity        Patient education        Posture education          Mechanical Reassessment 30'                                      Modalities        Ice          \               "

## 2025-03-19 ENCOUNTER — OFFICE VISIT (OUTPATIENT)
Dept: PHYSICAL THERAPY | Facility: CLINIC | Age: 55
End: 2025-03-19
Payer: COMMERCIAL

## 2025-03-19 DIAGNOSIS — M25.511 CHRONIC RIGHT SHOULDER PAIN: Primary | ICD-10-CM

## 2025-03-19 DIAGNOSIS — G89.29 CHRONIC RIGHT SHOULDER PAIN: Primary | ICD-10-CM

## 2025-03-19 PROCEDURE — 97112 NEUROMUSCULAR REEDUCATION: CPT

## 2025-03-19 PROCEDURE — 97110 THERAPEUTIC EXERCISES: CPT

## 2025-03-19 NOTE — PROGRESS NOTES
PT Re-Evaluation/ Daily Note     Today's date: 3/19/2025  Patient name: Tony Ramsay  : 1970  MRN: [id number]  Referring provider: Brayden Dos Santos MD  Dx:   Encounter Diagnosis     ICD-10-CM    1. Chronic right shoulder pain  M25.511     G89.29           Start Time: 1315  Stop Time: 1400  Total time in clinic (min): 45 minutes       Assessment  Assessment details: Patient is a 54 y.o. Male who presents to skilled outpatient PT with referring diagnosis of chronic right shoulder pain s/p a manipulation under anesthesia. Tony is making slow but steady progress toward goals. He is most troubled by lack of strength and feelings of stiffness. Tony reports 50% improvement since beginning of PT. He continues to remain a good candidate for skilled physical therapy.    Impairments: Abnormal or restricted ROM, Activity intolerance, Impaired physical strength, Lacks appropriate HEP, Poor posture, and Pain with function  Understanding of Dx/Px/POC: Excellent  Prognosis: Good        Plan  Patient would benefit from: PT Eval and Skilled PT  Planned modality interventions: Dry needling, Biofeedback, Cryotherapy, TENS, Thermotherapy: Hydrocollator Packs, and Traction  Planned therapy interventions: Abdominal trunk stabilization, Breathing training, Dry Needling, Functional ROM exercises, HEP, Joint mobilization, Manual therapy, Pascual taping, Neuromuscular re-education, Patient education, Postural training, Strengthening, Stretching, Therapeutic activities, and Therapeutic exercises  Frequency: 5x/wk for 2 weks, then 2-3x per week  Duration in weeks: 8  Plan of Care beginning date: 25  Plan of Care expiration date: 8 weeks - 3/21/2025  Treatment plan discussed with: Patient        Goals  Short Term Goals (4 weeks):    - Patient will be independent in basic HEP 2-3 weeks  - Patient will have 0/10 pain at rest  - Patient will demonstrate >1/3 improvement in MMT grade as applicable  - Patient will be able to  achieve 160 deg of passive flexion     Long Term Goals (8 weeks):  - Patient will be independent in a comprehensive home exercise program  - Patient FOTO score will improve by 10 points.   - Patient will self-report >75% improvement in function  - Patient will be able to lift 3# to overhead shelf        Subjective     History of Present Illness  - Mechanism of injury: Pain since last May. Manipulation under anesthesia performed on 25 with surgical notes for aggressive ROM in PT.      - Functional limitations: sleep on side, don/ doff clothing,      - Patient goals: no pain      Red Flag Screening               Positive for: age >50 years old              Negative for: history of cancer, fever, chills, night sweats, weight loss, recent infection, immunosuppression, rest/night pain, saddle anesthesia, bladder dysfunction, and LE neurological deficits       Update 3/19/25: Reports that sleeping on side is still bothersome, lifting arm above 90 deg abd, and straight arm flexion past 120 deg is painful. Self-reports 50% improvement. Still feels weak.   Pain  - Current pain ratin-2/10  - At best pain ratin-2/10  - At worst pain ratin-2/10  - Location: right shoulder  - Alleviating factors: rest, anti inflammatory     Social Support  - Steps to enter house: 4  - Stairs in house: 15   - Bedroom/bathroom set up: 2nd floor  - Lives in: house  - Lives with: wife        Objective   UE MMT     L Shoulder Flexion: 5/5 R Shoulder Flexion: 4/5   L Shoulder Extension: 5/5 R Shoulder Extension: 4+/5   L Shoulder Abduction: 5/5 R Shoulder Abduction: 4/5   L Shoulder  IR: 5/5 R Shoulder  IR: 4+/5   L Shoulder  ER: 5/5 R Shoulder  ER: 4/5 + pain   L Elbow Extension: 5/5 R Elbow Extension: 5/5   L Elbow Flexion: 5/5 R Elbow Flexion:5/5   L Wrist Flexion: 5/5 R Wrist Flexion: 5/5   L Wrist Extension: 5/5 R Wrist Extension: 5/5       hand : L:60     R: 48     Sensation  - Light touch: diminished on right     Palpation  "  -b/l upper trapezius     Shoulder AROM L R   Flexion wfl deg  140   Extension wfl deg  10   ER wfl deg  10 deg + pain   IR wfl deg     Abduction wfl deg  120   Functional IR BTB wfl  to belt line   Functional ER BTH wfl  to C6      Shoulder PROM L R   Flexion  Wfl deg 150 deg   Extension wfl deg 10 deg   ER wfl deg 40 deg   IR wfl deg 90 deg   Abduction wfl deg 90 deg         Precautions: s/p manipulation under anesthesia   Past Medical History:   Diagnosis Date    Diabetes mellitus (HCC)     Gout     High cholesterol     Tendonitis          Insurance Eval/ Re-eval POC expires Auth Status Total visits  Start date  Expiration date Misc   CMS 1/24/25 3/21/25         3/19/25 5/30/25          Date 2/26/25 2/27/25 3/5/25 3/13/25 3/17/25    Visit Number 17 18 19 20 21    Auth                  Manual         GH mobs  6 min  PROM 5'      Tspine mobs 2 min P to A mobs        Scap mobs          TPR                  Neuro Re-ed Wall slide w towel holding iso ER  20x Wall slide 3x10 Wall slide 3x10 Wall slide 3x10 Wall slide 3x10    Scap retract  Purple  30   Purple rows 3x10  Wall press into foam roller with GTB pull apart 10x Supine/ seated ER 2*10      Seated thoracic ext +1/2 roll 20x   Side lying ER 2# 2*10   Pulleys 2 mn  Ball shoulder 4 way 20x 5 sec hold Ball shoulder 4 way 20x 5 sec hold Ball shoulder 4 way 20x 5 sec hold Ball shoulder 4 way 20x 5 sec hold    ER/IR x20  pink tubing  Standing plank hands on table - shoulder tap alt 20x Standing plank hands on table - shoulder tap alt 20x Standing plank hands on table - shoulder tap alt 20x Standing plank hands on table - shoulder tap alt 20x      Supine arm flex with dowel 20x, ER 20x Bicep curls 2# and 4# (L arm) 3x10  Standing abd/ flex 1-2# AAROM 2*10 TRX pull up 20x    20x  5\"    Prone I, T 15x ea        4# db holds and gravity pu;ls x20   Body blade 3*30 sec       Body blade seated ER/IR 4x10\"    Arm straight slight abduction 3x10\"     Thera Ex         Active " warmup    Side bending over swiss ball with RUE on ball x20  Peanut roll on table stretch Peanut roll on table stretch   AAROM Cane abduction standing 2x10     Rep cane extension 2x10     Supine flexion with cane x20    Chest press with cane x20         Bicep curls supinated 3# db 2x10     Brachioradialis  2x10        Shoulder PROM CC   High row 7.5# 2x10                 TB shoulder ext         TB row  3#  20        TB IR/ER          Wall slides          Prone I, Y, T         SL ER         pendulum         Thera Activity         Patient education         Posture education           Mechanical Reassessment 30'                                           Modalities         Ice           \

## 2025-03-24 ENCOUNTER — OFFICE VISIT (OUTPATIENT)
Dept: PHYSICAL THERAPY | Facility: CLINIC | Age: 55
End: 2025-03-24
Payer: COMMERCIAL

## 2025-03-24 DIAGNOSIS — G89.29 CHRONIC RIGHT SHOULDER PAIN: Primary | ICD-10-CM

## 2025-03-24 DIAGNOSIS — M25.511 CHRONIC RIGHT SHOULDER PAIN: Primary | ICD-10-CM

## 2025-03-24 PROCEDURE — 97110 THERAPEUTIC EXERCISES: CPT

## 2025-03-24 PROCEDURE — 97112 NEUROMUSCULAR REEDUCATION: CPT

## 2025-03-24 NOTE — PROGRESS NOTES
Daily Note     Today's date: 3/24/2025  Patient name: Tony Ramsay  : 1970  MRN: [id number]  Referring provider: Brayden Dos Santos MD  Dx:   Encounter Diagnosis     ICD-10-CM    1. Chronic right shoulder pain  M25.511     G89.29             Subjective: Patient reports feeling good today, has been having mostly stiffness.       Objective: See treatment diary below      Assessment: Tolerated treatment well. Patient continues to have significant stiffness into IR, did slightly improve with PT OP into repeated extension. Overall doing well with slow progress. Will continue to progress as tolerated as he is still limited by pain. Patient demonstrated fatigue post treatment, exhibited good technique with therapeutic exercises, and would benefit from continued PT to continue with return to function.       Plan: Continue per plan of care.      Precautions: s/p manipulation under anesthesia   Past Medical History:   Diagnosis Date    Diabetes mellitus (HCC)     Gout     High cholesterol     Tendonitis          Insurance Eval/ Re-eval POC expires Auth Status Total visits  Start date  Expiration date Misc   CMS 1/24/25 3/21/25         3/19/25 5/30/25          Date 3/5/25 3/13/25 3/17/25 3/19/25 3/24/25   Visit Number 19 20 21 22    Auth                Manual        GH mobs  PROM 5'       Tspine mobs        Scap mobs         TPR                Neuro Re-ed Wall slide 3x10 Wall slide 3x10 Wall slide 3x10  Banded wall square x15   Scap retract   Purple rows 3x10  Wall press into foam roller with GTB pull apart 10x Supine/ seated ER 2*10    Sidelying ER Seated thoracic ext +1/2 roll 20x   Side lying ER 2# 2*10    Pulleys Ball shoulder 4 way 20x 5 sec hold Ball shoulder 4 way 20x 5 sec hold Ball shoulder 4 way 20x 5 sec hold Ball shoulder 4 way 20x 5 sec hold    planks Standing plank hands on table - shoulder tap alt 20x Standing plank hands on table - shoulder tap alt 20x Standing plank hands on table - shoulder tap  "alt 20x Standing plank hands on table - shoulder tap alt 20x Table plank pull throughs4# db x20    TRX Supine arm flex with dowel 20x, ER 20x Bicep curls 2# and 4# (L arm) 3x10  Standing abd/ flex 1-2# AAROM 2*10 TRX pull up 20x       Prone I, T 15x ea  TRX pull up 20x     4# db holds and gravity pu;ls x20       Body blade  Body blade seated ER/IR 4x10\"    Arm straight slight abduction 3x10\"  Body blade 3*30 sec Body blade seated ER/IR 4x10\"    Arm straight slight abduction 3x10\"   Standing [place]     Purpl standing 3x30   Thera Ex        Active warmup  Side bending over swiss ball with RUE on ball x20  Peanut roll on table stretch Peanut roll on table stretch    AAROM             IR BTB stretch w SOS x20    Shoulder PROM     5' post session assessment        IR pull throughs x20    pendulum     Bicep curls 4# db x20     Brachialis curls^ same        Standing thoracic ext over foam roll x20        Standing W's 2x10    Thera Activity        Patient education        Posture education                                                Modalities        Ice          \                   "

## 2025-03-26 ENCOUNTER — OFFICE VISIT (OUTPATIENT)
Dept: PHYSICAL THERAPY | Facility: CLINIC | Age: 55
End: 2025-03-26
Payer: COMMERCIAL

## 2025-03-26 DIAGNOSIS — G89.29 CHRONIC RIGHT SHOULDER PAIN: Primary | ICD-10-CM

## 2025-03-26 DIAGNOSIS — M25.511 CHRONIC RIGHT SHOULDER PAIN: Primary | ICD-10-CM

## 2025-03-26 PROCEDURE — 97110 THERAPEUTIC EXERCISES: CPT

## 2025-03-26 PROCEDURE — 97112 NEUROMUSCULAR REEDUCATION: CPT

## 2025-03-26 NOTE — PROGRESS NOTES
"  [facility]        NAME: Viral Whitmore is a 42 y.o. male  : 1982    MRN: [id number]  DATE: 2025  TIME: 2:28 PM    Assessment and Plan   Acute URI [J06.9]  1. Acute URI  fluticasone (FLONASE) 50 mcg/act nasal spray    benzonatate (TESSALON) 200 MG capsule            Patient Instructions       Follow up with PCP in 3-5 days.  Proceed to  ER if symptoms worsen.    If tests have been performed at [facility], our office will contact you with results if changes need to be made to the care plan discussed with you at the visit.  [facility]    Chief Complaint     Chief Complaint   Patient presents with    COMMON COLD      Onset 3/23 Pt states he is congested, has a head cold and a productive cough          History of Present Illness       41 y/o M presenting with URI sx x4 days. Complaining of congestion, dry cough with occasional green sputum, fatigue, myalgias, sore throat, and PND. Denies any fevers but states he feels \"warmer\" than normal. Denies headache, ear pain. No sick contacts. Has been using theraflu and nasal/sinus rinses which have been helpful/         Review of Systems   Review of Systems   Constitutional:  Positive for fatigue. Negative for chills and fever.   HENT:  Positive for congestion, postnasal drip, rhinorrhea and sore throat.    Respiratory:  Positive for cough.    Gastrointestinal:  Negative for abdominal pain, diarrhea, nausea and vomiting.   Musculoskeletal:  Positive for myalgias.   Neurological:  Negative for headaches.         Current Medications       Current Outpatient Medications:     benzonatate (TESSALON) 200 MG capsule, Take 1 capsule (200 mg total) by mouth 3 (three) times a day as needed for cough, Disp: 20 capsule, Rfl: 0    fluticasone (FLONASE) 50 mcg/act nasal spray, 1 spray into each nostril daily, Disp: 9.9 mL, Rfl: 0    benzonatate (TESSALON PERLES) 100 mg capsule, Take 1 capsule (100 mg total) by mouth 3 " Assessment/Plan:  Achilles tendinitis right lower extremity  Rule out grade 1 tear of right Achilles tendon  Pain upon ambulation  Acquired pes planus  Plan  Foot exam performed  X-rays taken  Right Achilles tendon area trigger point injection done  1 cc dexamethasone phosphate injected to area of Achilles tendon this was done without pain or complication  Patient will stretch daily  We will add topical anti-inflammatory medicine patient's feet have been casted for custom molded foot orthotics  Patient declines regiment of physical therapy  Diagnoses and all orders for this visit:    Achilles tendinitis of right lower extremity  -     US MSK complete; Future  -     diclofenac sodium (VOLTAREN) 1 %; Apply 2 g topically 4 (four) times a day    Acquired deformity of foot, unspecified laterality    Acquired flat foot, right    Acquired flat foot, left    Other orders  -     colesevelam (WELCHOL) 625 mg tablet  -     cholecalciferol (VITAMIN D3) 400 units tablet; Take 400 Units by mouth daily          Subjective:  Patient has pain in his right Achilles tendon  This has been ongoing for several months  He remembers using a ladder and since that time he has pain in his right Achilles tendon  No history of occult trauma    No Known Allergies      Current Outpatient Medications:     cholecalciferol (VITAMIN D3) 400 units tablet, Take 400 Units by mouth daily, Disp: , Rfl:     colesevelam (WELCHOL) 625 mg tablet, , Disp: , Rfl:     diclofenac sodium (VOLTAREN) 1 %, Apply 2 g topically 4 (four) times a day, Disp: 240 g, Rfl: 0    There is no problem list on file for this patient  Patient ID: Omid Juarez is a 44 y o  male  HPI    The following portions of the patient's history were reviewed and updated as appropriate:     family history is not on file  reports that he has never smoked   He has never used smokeless tobacco  He reports that he does not drink alcohol or use (three) times a day as needed for cough (Patient not taking: Reported on 4/30/2019), Disp: 30 capsule, Rfl: 0    benzonatate (TESSALON PERLES) 100 mg capsule, Take 1 capsule (100 mg total) by mouth 3 (three) times a day as needed for cough (Patient not taking: Reported on 11/3/2023), Disp: 20 capsule, Rfl: 0    benzonatate (TESSALON PERLES) 100 mg capsule, Take 1 capsule (100 mg total) by mouth 3 (three) times a day as needed for cough, Disp: 20 capsule, Rfl: 0    benzonatate (TESSALON) 200 MG capsule, Take 1 capsule (200 mg total) by mouth 3 (three) times a day as needed for cough, Disp: 20 capsule, Rfl: 0    cetirizine (ZyrTEC) 10 mg tablet, Take 1 tablet (10 mg total) by mouth daily for 10 days, Disp: 10 tablet, Rfl: 0    fluticasone (FLONASE) 50 mcg/act nasal spray, 1 spray into each nostril 2 (two) times a day, Disp: 11.1 mL, Rfl: 0    VENTOLIN  (90 Base) MCG/ACT inhaler, INHALE 2 PUFFS BY MOUTH EVERY 6 HOURS AS NEEDED FOR WHEEZE (Patient not taking: Reported on 12/9/2019), Disp: 18 Inhaler, Rfl: 0    Current Allergies     Allergies as of 03/26/2025    (No Known Allergies)            The following portions of the patient's history were reviewed and updated as appropriate: allergies, current medications, past family history, past medical history, past social history, past surgical history and problem list.     Past Medical History:   Diagnosis Date    Pneumonia        No past surgical history on file.    No family history on file.      Medications have been verified.        Objective   /80   Pulse 64   Temp (!) 97.1 °F (36.2 °C)   Resp 18   SpO2 97%   No LMP for male patient.       Physical Exam     Physical Exam  Vitals and nursing note reviewed.   Constitutional:       General: He is not in acute distress.     Appearance: Normal appearance. He is not toxic-appearing.   HENT:      Head: Normocephalic and atraumatic.      Right Ear: Tympanic membrane, ear canal and external ear normal.      Left  drugs     Vitals:    02/26/20 0859   BP: 134/88   Pulse: 76   Resp: 16       Review of Systems      Objective:  Patient's shoes and socks removed  Foot Exam    General  General Appearance: appears stated age and healthy   Orientation: alert and oriented to person, place, and time   Affect: appropriate   Gait: antalgic       Right Foot/Ankle     Inspection and Palpation  Ecchymosis: none  Tenderness: (Midbody right Achilles tendon )  Arch: pes planus  Hammertoes: fifth toe  Hallux valgus: no  Hallux limitus: yes    Neurovascular  Dorsalis pedis: 3+  Posterior tibial: 3+  Saphenous nerve sensation: normal  Tibial nerve sensation: normal  Superficial peroneal nerve sensation: normal  Deep peroneal nerve sensation: normal  Sural nerve sensation: normal  Achilles reflex: 2+  Babinski reflex: 2+    Range of Motion    Passive  Ankle dorsiflexion: 5        Left Foot/Ankle      Inspection and Palpation  Ecchymosis: none  Arch: pes planus  Hammertoes: fifth toe  Hallux valgus: no  Hallux limitus: yes    Neurovascular  Dorsalis pedis: 3+  Posterior tibial: 3+  Saphenous nerve sensation: normal  Tibial nerve sensation: normal  Superficial peroneal nerve sensation: normal  Deep peroneal nerve sensation: normal  Sural nerve sensation: normal  Achilles reflex: 2+  Babinski reflex: 2+    Range of Motion    Passive  Ankle dorsiflexion: 5          Physical Exam   Constitutional: He is oriented to person, place, and time  He appears well-developed and well-nourished  Cardiovascular: Normal rate and regular rhythm  Pulses:       Dorsalis pedis pulses are 3+ on the right side, and 3+ on the left side  Posterior tibial pulses are 3+ on the right side, and 3+ on the left side  Musculoskeletal: He exhibits edema, tenderness and deformity  Patient is pronated in stance and gait  He demonstrates equines deformity bilateral   Right Achilles tendon body demonstrates fusiform swelling consistent with grade 1 tear    No absolute Ear: Tympanic membrane, ear canal and external ear normal.      Nose: Nose normal.      Mouth/Throat:      Mouth: Mucous membranes are moist.      Pharynx: Posterior oropharyngeal erythema present. No oropharyngeal exudate.   Eyes:      Conjunctiva/sclera: Conjunctivae normal.   Cardiovascular:      Rate and Rhythm: Normal rate and regular rhythm.      Pulses: Normal pulses.      Heart sounds: Normal heart sounds.   Pulmonary:      Effort: Pulmonary effort is normal.      Breath sounds: Normal breath sounds.   Musculoskeletal:      Cervical back: No tenderness.   Lymphadenopathy:      Cervical: No cervical adenopathy.   Skin:     General: Skin is warm and dry.   Neurological:      Mental Status: He is alert.   Psychiatric:         Mood and Affect: Mood normal.         Behavior: Behavior normal.                    rupture or deficit noted  Neurological: He is alert and oriented to person, place, and time  Reflex Scores:       Achilles reflexes are 2+ on the right side and 2+ on the left side  Skin: Skin is warm  Capillary refill takes less than 2 seconds  Psychiatric: He has a normal mood and affect  His behavior is normal  Judgment and thought content normal    Vitals reviewed

## 2025-03-26 NOTE — PROGRESS NOTES
Daily Note     Today's date: 3/26/2025  Patient name: Tony Ramsay  : 1970  MRN: [id number]  Referring provider: Brayden Dos Santos MD  Dx:   Encounter Diagnosis     ICD-10-CM    1. Chronic right shoulder pain  M25.511     G89.29           Start Time: 1100  Stop Time: 1145  Total time in clinic (min): 45 minutes    Subjective: no change since last in session      Objective: See treatment diary below      Assessment: Tolerated treatment well. Patient would benefit from continued PT in order to increase right shoulder stability and ROM. Tony was challenged by single arm row at 6# and not at 4#. Fatigued by session end, still limited by pain at end ROM.      Plan: Continue per plan of care.      Precautions: s/p manipulation under anesthesia   Past Medical History:   Diagnosis Date    Diabetes mellitus (HCC)     Gout     High cholesterol     Tendonitis          Insurance Eval/ Re-eval POC expires Auth Status Total visits  Start date  Expiration date Misc   CMS 1/24/25 3/21/25         3/19/25 5/30/25          Date 3/5/25 3/13/25 3/17/25 3/19/25 3/24/25 3/26/25   Visit Number 19 20 21 22  24   Auth                  Manual         GH mobs  PROM 5'        Tspine mobs         Scap mobs          TPR                  Neuro Re-ed Wall slide 3x10 Wall slide 3x10 Wall slide 3x10  Banded wall square x15 Banded wall square x15   Scap retract   Purple rows 3x10  Wall press into foam roller with GTB pull apart 10x Supine/ seated ER 2*10  Slow, sustained BTB abd with cane 5 sec hold x 10    Sidelying ER Seated thoracic ext +1/2 roll 20x   Side lying ER 2# 2*10  Side lying ER 2# 2*10   Pulleys Ball shoulder 4 way 20x 5 sec hold Ball shoulder 4 way 20x 5 sec hold Ball shoulder 4 way 20x 5 sec hold Ball shoulder 4 way 20x 5 sec hold     planks Standing plank hands on table - shoulder tap alt 20x Standing plank hands on table - shoulder tap alt 20x Standing plank hands on table - shoulder tap alt 20x Standing plank hands on  "table - shoulder tap alt 20x Table plank pull throughs4# db x20  Table plank pull throughs4# db x20    TRX Supine arm flex with dowel 20x, ER 20x Bicep curls 2# and 4# (L arm) 3x10  Standing abd/ flex 1-2# AAROM 2*10 TRX pull up 20x  UE at 90 deg abd IR/ ER lav band 15x ea      Prone I, T 15x ea  TRX pull up 20x TRX pull up 20x     4# db holds and gravity pu;ls x20        Body blade  Body blade seated ER/IR 4x10\"    Arm straight slight abduction 3x10\"  Body blade 3*30 sec Body blade seated ER/IR 4x10\"    Arm straight slight abduction 3x10\" Body blade seated ER/IR 4x10\"    Arm straight slight abduction 3x10\"   Standing steamboat     Purpl standing 3x30    Thera Ex         Active warmup  Side bending over swiss ball with RUE on ball x20  Peanut roll on table stretch Peanut roll on table stretch  Farmer's carry 50' x4 6#   AAROM      Shoulder 4 way lav resistance 20x ea        IR BTB stretch w SOS x20     Shoulder PROM     5' post session assessment         IR pull throughs x20     pendulum     Bicep curls 4# db x20     Brachialis curls^ same Bicep curls 4# db x20     Brachialis curls^ same        Standing thoracic ext over foam roll x20 Wall slide with pull apart         Standing W's 2x10     Thera Activity         Patient education         Posture education                                                      Modalities         Ice           \                     "

## 2025-03-31 ENCOUNTER — OFFICE VISIT (OUTPATIENT)
Dept: PHYSICAL THERAPY | Facility: CLINIC | Age: 55
End: 2025-03-31
Payer: COMMERCIAL

## 2025-03-31 DIAGNOSIS — G89.29 CHRONIC RIGHT SHOULDER PAIN: Primary | ICD-10-CM

## 2025-03-31 DIAGNOSIS — M25.511 CHRONIC RIGHT SHOULDER PAIN: Primary | ICD-10-CM

## 2025-03-31 PROCEDURE — 97112 NEUROMUSCULAR REEDUCATION: CPT

## 2025-03-31 PROCEDURE — 97110 THERAPEUTIC EXERCISES: CPT

## 2025-03-31 NOTE — PROGRESS NOTES
Daily Note     Today's date: 3/31/2025  Patient name: Tony Ramsay  : 1970  MRN: [id number]  Referring provider: Brayden Dos Santos MD  Dx:   Encounter Diagnosis     ICD-10-CM    1. Chronic right shoulder pain  M25.511     G89.29                      Subjective: Reports pain and stiffness in shoulder - 2-3/10      Objective: See treatment diary below      Assessment: Tolerated treatment well. Patient would benefit from continued PT in order to increase right shoulder ROM and strength. Tony is making slow but steady progress toward goals but is still limited in ER due to pain.  Self IR with behind the back stretch with SOS painful.       Plan: Continue per plan of care.      Precautions: s/p manipulation under anesthesia   Past Medical History:   Diagnosis Date    Diabetes mellitus (HCC)     Gout     High cholesterol     Tendonitis          Insurance Eval/ Re-eval POC expires Auth Status Total visits  Start date  Expiration date Misc   CMS 1/24/25 3/21/25         3/19/25 5/30/25          Date 3/17/25 3/19/25 3/24/25 3/26/25 3/31/25   Visit Number 21 22  24 25   Auth                Manual        GH mobs         Tspine mobs        Scap mobs         TPR                Neuro Re-ed Wall slide 3x10  Banded wall square x15 Banded wall square x15 Wall clock RTB 10x   Scap retract  Wall press into foam roller with GTB pull apart 10x Supine/ seated ER 2*10  Slow, sustained BTB abd with cane 5 sec hold x 10  slow, sustained BTB abd with cane 5 sec hold x 10    Sidelying ER  Side lying ER 2# 2*10  Side lying ER 2# 2*10    Pulleys Ball shoulder 4 way 20x 5 sec hold Ball shoulder 4 way 20x 5 sec hold      planks Standing plank hands on table - shoulder tap alt 20x Standing plank hands on table - shoulder tap alt 20x Table plank pull throughs4# db x20  Table plank pull throughs4# db x20     TRX Standing abd/ flex 1-2# AAROM 2*10 TRX pull up 20x  UE at 90 deg abd IR/ ER lav band 15x ea UE at 90 deg abd IR/ ER lav band  "15x ea    Prone I, T 15x ea  TRX pull up 20x TRX pull up 20x TRX pull up 20x           Body blade  Body blade 3*30 sec Body blade seated ER/IR 4x10\"    Arm straight slight abduction 3x10\" Body blade seated ER/IR 4x10\"    Arm straight slight abduction 3x10\" Body blade ER/IR 4x10\"    Arm straight slight abduction 3x10\"   Standing steamboat   Purpl standing 3x30     Thera Ex        Active warmup Peanut roll on table stretch Peanut roll on table stretch  Farmer's carry 50' x4 6#    AAROM    Shoulder 4 way lav resistance 20x ea Ball 4 way 5 sec 20x      IR BTB stretch w SOS x20   R BTB stretch w SOS x20    Shoulder PROM   5' post session assessment        IR pull throughs x20      pendulum   Bicep curls 4# db x20     Brachialis curls^ same Bicep curls 4# db x20     Brachialis curls^ same Bicep curls 4# db x20     Brachialis curls^ same      Standing thoracic ext over foam roll x20 Wall slide with pull apart  Wall slide with pull apart       Standing W's 2x10      Thera Activity        Patient education        Posture education                                                Modalities        Ice          \                       "

## 2025-04-02 ENCOUNTER — OFFICE VISIT (OUTPATIENT)
Dept: PHYSICAL THERAPY | Facility: CLINIC | Age: 55
End: 2025-04-02
Payer: COMMERCIAL

## 2025-04-02 DIAGNOSIS — G89.29 CHRONIC RIGHT SHOULDER PAIN: Primary | ICD-10-CM

## 2025-04-02 DIAGNOSIS — M25.511 CHRONIC RIGHT SHOULDER PAIN: Primary | ICD-10-CM

## 2025-04-02 PROCEDURE — 97110 THERAPEUTIC EXERCISES: CPT

## 2025-04-02 PROCEDURE — 97112 NEUROMUSCULAR REEDUCATION: CPT

## 2025-04-02 NOTE — PROGRESS NOTES
Daily Note     Today's date: 2025  Patient name: Toyn Ramsay  : 1970  MRN: [id number]  Referring provider: Brayden Dos Santos MD  Dx:   Encounter Diagnosis     ICD-10-CM    1. Chronic right shoulder pain  M25.511     G89.29                      Subjective: Reports no change since last in session      Objective: See treatment diary below      Assessment: Tolerated treatment well. Patient would benefit from continued PT in order to strengthen shoulder girdle and increase ROM. Trialled arm bike for warm up with good result.  Fatigued by end of session. Limited by pain but strength is improving.       Plan: Continue per plan of care.      Precautions: s/p manipulation under anesthesia   Past Medical History:   Diagnosis Date    Diabetes mellitus (HCC)     Gout     High cholesterol     Tendonitis          Insurance Eval/ Re-eval POC expires Auth Status Total visits  Start date  Expiration date Misc   CMS 1/24/25 3/21/25         3/19/25 5/30/25          Date 3/17/25 3/19/25 3/24/25 3/26/25 3/31/25 4/2/25   Visit Number 21 22  24 25    Auth                  Manual         GH mobs          Tspine mobs         Scap mobs          TPR                  Neuro Re-ed Wall slide 3x10  Banded wall square x15 Banded wall square x15 Wall clock RTB 10x Wall clock RTB 10x   Scap retract  Wall press into foam roller with GTB pull apart 10x Supine/ seated ER 2*10  Slow, sustained BTB abd with cane 5 sec hold x 10  slow, sustained BTB abd with cane 5 sec hold x 10  Row with lav band single arm +ER 20x   Sidelying ER  Side lying ER 2# 2*10  Side lying ER 2# 2*10     Pulleys Ball shoulder 4 way 20x 5 sec hold Ball shoulder 4 way 20x 5 sec hold    Wall slide with lift off RTB 20x   planks Standing plank hands on table - shoulder tap alt 20x Standing plank hands on table - shoulder tap alt 20x Table plank pull throughs4# db x20  Table plank pull throughs4# db x20      TRX Standing abd/ flex 1-2# AAROM 2*10 TRX pull up 20x  UE  "at 90 deg abd IR/ ER lav band 15x ea UE at 90 deg abd IR/ ER lav band 15x ea     Prone I, T 15x ea  TRX pull up 20x TRX pull up 20x TRX pull up 20x TRX pull up 20x            Body blade  Body blade 3*30 sec Body blade seated ER/IR 4x10\"    Arm straight slight abduction 3x10\" Body blade seated ER/IR 4x10\"    Arm straight slight abduction 3x10\" Body blade ER/IR 4x10\"    Arm straight slight abduction 3x10\" Body blade ER/IR 4x10\"   Standing steamboat   Purpl standing 3x30   Side lying ER 2# 2*10   Thera Ex         Active warmup Peanut roll on table stretch Peanut roll on table stretch  Farmer's carry 50' x4 6#  Serratus punches 2# 2* 10   AAROM    Shoulder 4 way lav resistance 20x ea Ball 4 way 5 sec 20x Ball 4 way 5 sec 20x      IR BTB stretch w SOS x20   R BTB stretch w SOS x20     Shoulder PROM   5' post session assessment   Resisted lateral walkout with arm at 90 deg IR/ER 10x      IR pull throughs x20    Tricep kick back 5# 2*10   pendulum   Bicep curls 4# db x20     Brachialis curls^ same Bicep curls 4# db x20     Brachialis curls^ same Bicep curls 4# db x20     Brachialis curls^ same Bicep curls 4# db x20     Brachialis curls^ same      Standing thoracic ext over foam roll x20 Wall slide with pull apart  Wall slide with pull apart        Standing W's 2x10       Thera Activity         Patient education         Posture education                                                      Modalities         Ice           \                         "

## 2025-04-08 ENCOUNTER — OFFICE VISIT (OUTPATIENT)
Dept: PHYSICAL THERAPY | Facility: CLINIC | Age: 55
End: 2025-04-08
Payer: COMMERCIAL

## 2025-04-08 DIAGNOSIS — G89.29 CHRONIC RIGHT SHOULDER PAIN: Primary | ICD-10-CM

## 2025-04-08 DIAGNOSIS — M25.511 CHRONIC RIGHT SHOULDER PAIN: Primary | ICD-10-CM

## 2025-04-08 PROCEDURE — 97110 THERAPEUTIC EXERCISES: CPT

## 2025-04-08 NOTE — PROGRESS NOTES
Daily Note     Today's date: 2025  Patient name: Tony Ramsay  : 1970  MRN: [id number]  Referring provider: Brayden Dos Santos MD  Dx:   Encounter Diagnosis     ICD-10-CM    1. Chronic right shoulder pain  M25.511     G89.29                      Subjective: Patient reports no major changes since last visit.      Objective: See treatment diary below      Assessment: Tolerated treatment fair- he continues to have significant pain with end range flexion and external rotation. Patient encouraged to continue with HEP as much as possible to maintain progress. Patient demonstrated fatigue post treatment, exhibited good technique with therapeutic exercises, and would benefit from continued PT to continue with symptom management. He may be appropriate for a HEP discharge in the next few visits due to plateau in progress.       Plan: Continue per plan of care.      Precautions: s/p manipulation under anesthesia   Past Medical History:   Diagnosis Date    Diabetes mellitus (HCC)     Gout     High cholesterol     Tendonitis          Insurance Eval/ Re-eval POC expires Auth Status Total visits  Start date  Expiration date Misc   CMS 1/24/25 3/21/25         3/19/25 5/30/25          Date 3/26/25 3/31/25 4/2/25 4/8/25   Visit Number 24 25  27   Auth              Manual       GH mobs        Tspine mobs       Scap mobs        TPR              Neuro Re-ed Banded wall square x15 Wall clock RTB 10x Wall clock RTB 10x    Scap retract  Slow, sustained BTB abd with cane 5 sec hold x 10  slow, sustained BTB abd with cane 5 sec hold x 10  Row with lav band single arm +ER 20x Row with lav band single arm +ER 20x   Sidelying ER Side lying ER 2# 2*10   Side lying ER 2# 2*10   Pulleys   Wall slide with lift off RTB 20x    planks Table plank pull throughs4# db x20       TRX UE at 90 deg abd IR/ ER lav band 15x ea UE at 90 deg abd IR/ ER lav band 15x ea      TRX pull up 20x TRX pull up 20x TRX pull up 20x        Prone row 3x10  "    Prone T's - held    Body blade Body blade seated ER/IR 4x10\"    Arm straight slight abduction 3x10\" Body blade ER/IR 4x10\"    Arm straight slight abduction 3x10\" Body blade ER/IR 4x10\" Body blade ER/IR     Abduction   4x10\"   Standing steamboat       Thera Ex       Active warmup Watkins's carry 50' x4 6#  Serratus punches 2# 2* 10    AAROM Shoulder 4 way lav resistance 20x ea Ball 4 way 5 sec 20x Ball 4 way 5 sec 20x      R BTB stretch w SOS x20   R BTB stretch w SOS x20    Shoulder PROM   Resisted lateral walkout with arm at 90 deg IR/ER 10x Resisted lateral walkout with arm at 90 deg IR/ER 10x      Tricep kick back 5# 2*10 In prone 3# 2x10    pendulum Bicep curls 4# db x20     Brachialis curls^ same Bicep curls 4# db x20     Brachialis curls^ same Bicep curls 4# db x20     Brachialis curls^ same     Wall slide with pull apart  Wall slide with pull apart             Thera Activity       Patient education       Posture education                                          Modalities       Ice         \                           "

## 2025-04-10 ENCOUNTER — OFFICE VISIT (OUTPATIENT)
Dept: PHYSICAL THERAPY | Facility: CLINIC | Age: 55
End: 2025-04-10
Payer: COMMERCIAL

## 2025-04-10 DIAGNOSIS — M25.511 CHRONIC RIGHT SHOULDER PAIN: Primary | ICD-10-CM

## 2025-04-10 DIAGNOSIS — G89.29 CHRONIC RIGHT SHOULDER PAIN: Primary | ICD-10-CM

## 2025-04-10 PROCEDURE — 97110 THERAPEUTIC EXERCISES: CPT

## 2025-04-10 NOTE — PROGRESS NOTES
Daily Note     Today's date: 4/10/2025  Patient name: Tony Ramsay  : 1970  MRN: [id number]  Referring provider: Brayden Dos Santos MD  Dx:   Encounter Diagnosis     ICD-10-CM    1. Chronic right shoulder pain  M25.511     G89.29           Subjective: Patient reports some increased pain after Tuesday's session. Overall feeling achey.       Objective: See treatment diary below      Assessment: Tolerated treatment fair. Patient was challenged with increased OH stabilization today with intro to wall ball and body blade in overhead positions. Patient may be nearing therapeutic plateau and will have conversation with primary PT when she returns. Overall patient staying stagnant with pain and functionality. Patient demonstrated fatigue post treatment and would benefit from continued PT to continue with symptom management.       Plan: Continue per plan of care.      Precautions: s/p manipulation under anesthesia   Past Medical History:   Diagnosis Date    Diabetes mellitus (HCC)     Gout     High cholesterol     Tendonitis          Insurance Eval/ Re-eval POC expires Auth Status Total visits  Start date  Expiration date Misc   CMS 1/24/25 3/21/25         3/19/25 5/30/25          Date 3/26/25 3/31/25 4/2/25 4/8/25 4/10/25   Visit Number 24 25  27 28   Auth                Manual        GH mobs         Tspine mobs        Scap mobs         TPR                Neuro Re-ed Banded wall square x15 Wall clock RTB 10x Wall clock RTB 10x  Wall clock GTB 2x10    Scap retract  Slow, sustained BTB abd with cane 5 sec hold x 10  slow, sustained BTB abd with cane 5 sec hold x 10  Row with lav band single arm +ER 20x Row with lav band single arm +ER 20x Row with lav band single arm +ER 20x   Sidelying ER Side lying ER 2# 2*10   Side lying ER 2# 2*10    Pulleys   Wall slide with lift off RTB 20x     planks Table plank pull throughs4# db x20             Wall ball with yellow ball 3x30    TRX UE at 90 deg abd IR/ ER lav band 15x  "ea UE at 90 deg abd IR/ ER lav band 15x ea       TRX pull up 20x TRX pull up 20x TRX pull up 20x  TRX pull up 20x       Prone row 3x10     Prone T's - held  Prone row 3x10    Body blade Body blade seated ER/IR 4x10\"    Arm straight slight abduction 3x10\" Body blade ER/IR 4x10\"    Arm straight slight abduction 3x10\" Body blade ER/IR 4x10\" Body blade ER/IR     Abduction   4x10\" Body blade ER/IR     Abduction   4x10\"    In flexion 3x30\"   Standing steamboat     Open books 2x10 bilateral    Thera Ex        Active warmup Watkins's carry 50' x4 6#  Serratus punches 2# 2* 10     AAROM Shoulder 4 way lav resistance 20x ea Ball 4 way 5 sec 20x Ball 4 way 5 sec 20x       R BTB stretch w SOS x20   R BTB stretch w SOS x20     Shoulder PROM   Resisted lateral walkout with arm at 90 deg IR/ER 10x Resisted lateral walkout with arm at 90 deg IR/ER 10x Resisted lateral walkout with arm at 90 deg IR/ER 10x      Tricep kick back 5# 2*10 In prone 3# 2x10     pendulum Bicep curls 4# db x20     Brachialis curls^ same Bicep curls 4# db x20     Brachialis curls^ same Bicep curls 4# db x20     Brachialis curls^ same  Bicep curls 4# db x20     Brachialis curls^ same    Wall slide with pull apart  Wall slide with pull apart               Thera Activity        Patient education        Posture education                                                Modalities        Ice          \                             "

## 2025-04-15 ENCOUNTER — OFFICE VISIT (OUTPATIENT)
Dept: PHYSICAL THERAPY | Facility: CLINIC | Age: 55
End: 2025-04-15
Payer: COMMERCIAL

## 2025-04-15 ENCOUNTER — OFFICE VISIT (OUTPATIENT)
Dept: OBGYN CLINIC | Facility: CLINIC | Age: 55
End: 2025-04-15

## 2025-04-15 VITALS — HEIGHT: 71 IN | WEIGHT: 188 LBS | BODY MASS INDEX: 26.32 KG/M2

## 2025-04-15 DIAGNOSIS — G89.29 CHRONIC RIGHT SHOULDER PAIN: Primary | ICD-10-CM

## 2025-04-15 DIAGNOSIS — Z98.890 S/P ARTHROSCOPY OF RIGHT SHOULDER: Primary | ICD-10-CM

## 2025-04-15 DIAGNOSIS — M25.511 CHRONIC RIGHT SHOULDER PAIN: Primary | ICD-10-CM

## 2025-04-15 PROCEDURE — 99024 POSTOP FOLLOW-UP VISIT: CPT | Performed by: ORTHOPAEDIC SURGERY

## 2025-04-15 PROCEDURE — 97110 THERAPEUTIC EXERCISES: CPT

## 2025-04-15 RX ORDER — IBUPROFEN 600 MG/1
TABLET, FILM COATED ORAL
COMMUNITY
Start: 2025-04-07

## 2025-04-15 NOTE — PROGRESS NOTES
Patient Name: Tony Ramsay      : 1970       MRN: [id number]   Encounter Provider: Brayden Dos Santos MD   Encounter Date: 25  Encounter department: [facility] [place]         Assessment & Plan  S/P arthroscopy of right shoulder  Continue PT  USGI right glenohumeral injection with Dr. Aviles - referral provided  Continue NSAIDs PRN  Orders:  •  Ambulatory Referral to Orthopedic Surgery; Future       Plan:  Tony is a pleasant 54 y.o. male who presents nearly 12 weeks s/p ARTHROSCOPY SHOULDER, Lysis of Adhesion, Manipulation under anesthesia - Right on 2025. He is making progress in regards to his range of motion, even though it may be slow.  His motion is still much better than presurgical.  He still having inflammation and pain with end range of motion.  I recommend he receive a repeat USGI of the right glenohumeral joint with Dr. Aviles to help decrease his pain and inflammation. He should continue his efforts at physical therapy for the right shoulder. He should follow-up 6 weeks after his injection with Dr. Aviles for re-evaluation.    Follow-up:  Return in about 6 weeks (around 2025) for Recheck after USGI with Dr. Aviles.     _____________________________________________________  CHIEF COMPLAINT:  Chief Complaint   Patient presents with   • Right Shoulder - Post-op         SUBJECTIVE:  Tony Ramsay is a 54 y.o. male who presents nearly 12 weeks s/p ARTHROSCOPY SHOULDER, Lysis of Adhesion, Manipulation under anesthesia - Right on 2025. He states his pain remains 3-4/10. He has been attending physical therapy, which he feels is going slowly. He finds his shoulder continues to feel stiff.    PAST MEDICAL HISTORY:  Past Medical History:   Diagnosis Date   • Diabetes mellitus (HCC)    • Gout    • High cholesterol    • Tendonitis        PAST SURGICAL HISTORY:  Past Surgical History:   Procedure Laterality Date   • [facility] SURGICAL ARTHROSCOPY SHOULDER  W/LSS&[id number] ADS Right 01/23/2025    Procedure: ARTHROSCOPY SHOULDER, Lysis of Adhesion, Manipulation under anesthesia;  Surgeon: Brayden Dos Santos MD;  Location: [facility];  Service: Orthopedics       FAMILY HISTORY:  Family History   Family history unknown: Yes       SOCIAL HISTORY:  Social History     Tobacco Use   • Smoking status: Never     Passive exposure: Never   • Smokeless tobacco: Never   Vaping Use   • Vaping status: Never Used   Substance Use Topics   • Alcohol use: No   • Drug use: No       MEDICATIONS:    Current Outpatient Medications:   •  ALPHA LIPOIC ACID PO, Take by mouth, Disp: , Rfl:   •  amoxicillin-clavulanate (AUGMENTIN) 875-125 mg per tablet, , Disp: , Rfl:   •  Berberine Chloride (BERBERINE HCI PO), Take by mouth, Disp: , Rfl:   •  cholecalciferol (VITAMIN D3) 400 units tablet, Take 400 Units by mouth daily, Disp: , Rfl:   •  Continuous Glucose Sensor (FreeStyle Christian 2 Sensor) MISC, , Disp: , Rfl:   •  diclofenac (VOLTAREN) 75 mg EC tablet, Take 1 tablet (75 mg total) by mouth 2 (two) times a day, Disp: 60 tablet, Rfl: 0  •  ezetimibe (ZETIA) 10 mg tablet, Take 10 mg by mouth daily, Disp: , Rfl:   •  ibuprofen (MOTRIN) 600 mg tablet, , Disp: , Rfl:   •  omega-3-acid ethyl esters (LOVAZA) 1 g capsule, Take 1 g by mouth daily, Disp: , Rfl:   •  Synjardy XR 5-1000 MG TB24, , Disp: , Rfl:   •  Turmeric 500 MG CAPS, Take by mouth, Disp: , Rfl:   •  vitamin B-12 (CYANOCOBALAMIN) 50 MCG tablet, Take by mouth daily, Disp: , Rfl:   •  aspirin 81 mg chewable tablet, Chew 1 tablet (81 mg total) 2 (two) times a day for 28 days, Disp: 56 tablet, Rfl: 0  •  meloxicam (Mobic) 15 mg tablet, Take 1 tablet (15 mg total) by mouth daily (Patient not taking: Reported on 4/15/2025), Disp: 30 tablet, Rfl: 0  •  methylPREDNISolone 4 MG tablet therapy pack, Use as directed on package (Patient not taking: Reported on 4/15/2025), Disp: 1 each, Rfl: 0  •  ondansetron (ZOFRAN) 4 mg tablet, Take 1 tablet (4 mg  "total) by mouth every 8 (eight) hours as needed for nausea or vomiting (Patient not taking: Reported on 2/5/2025), Disp: 20 tablet, Rfl: 0  •  traMADol (Ultram) 50 mg tablet, Take 1 tablet (50 mg total) by mouth every 6 (six) hours as needed for moderate pain for up to 20 doses (Patient not taking: Reported on 4/15/2025), Disp: 20 tablet, Rfl: 0    ALLERGIES:  No Known Allergies    LABS:  HgA1c:   Lab Results   Component Value Date    HGBA1C 7.0 (H) 01/06/2025     BMP:   Lab Results   Component Value Date    CALCIUM 9.0 01/06/2025    K 4.3 01/06/2025    CO2 28 01/06/2025     01/06/2025    BUN 19 01/06/2025    CREATININE 1.00 01/06/2025     CBC: No components found for: \"CBC\"    _____________________________________________________  Review of Systems   Constitutional:  Negative for chills and fever.   HENT:  Negative for ear pain and sore throat.    Eyes:  Negative for pain and visual disturbance.   Respiratory:  Negative for cough and shortness of breath.    Cardiovascular:  Negative for chest pain and palpitations.   Gastrointestinal:  Negative for abdominal pain and vomiting.   Genitourinary:  Negative for dysuria and hematuria.   Musculoskeletal:  Positive for arthralgias. Negative for back pain.   Skin:  Negative for color change and rash.   Neurological:  Negative for seizures and syncope.   All other systems reviewed and are negative.       Right Shoulder Exam     Tenderness   The patient is experiencing no tenderness.    Range of Motion   Right shoulder external rotation: 50° passively.   Right shoulder forward flexion: 160° passively.   Internal rotation 0 degrees:  Sacrum     Other   Erythema: absent  Scars: present (well-healed surgical scars)  Sensation: normal  Pulse: present    Comments:  Demonstrates normal elbow, wrist, and finger motion  2+  distal radial pulse with brisk capillary refill to the fingers  Radial, median, ulnar and motor  and sensory distribution intact  Sensation to light touch " intact distally              Physical Exam  Vitals and nursing note reviewed.   Constitutional:       Appearance: Normal appearance.   HENT:      Head: Normocephalic and atraumatic.      Right Ear: External ear normal.      Left Ear: External ear normal.      Nose: Nose normal.   Eyes:      General: No scleral icterus.     Extraocular Movements: Extraocular movements intact.      Conjunctiva/sclera: Conjunctivae normal.   Cardiovascular:      Rate and Rhythm: Normal rate.   Pulmonary:      Effort: Pulmonary effort is normal. No respiratory distress.   Musculoskeletal:      Cervical back: Normal range of motion and neck supple.      Comments: See ortho exam   Skin:     General: Skin is warm and dry.   Neurological:      Mental Status: He is alert and oriented to person, place, and time.   Psychiatric:         Mood and Affect: Mood normal.         Behavior: Behavior normal.        _____________________________________________________  STUDIES REVIEWED:  No new studies to review.      PROCEDURES PERFORMED:  No Procedures performed today    Scribe Attestation    I,:  Mervat Sanabria am acting as a scribe while in the presence of the attending physician.:       I,:  Brayden Dos Santos MD personally performed the services described in this documentation    as scribed in my presence.:

## 2025-04-15 NOTE — PROGRESS NOTES
"Daily Note     Today's date: 4/15/2025  Patient name: Tony Ramsay  : 1970  MRN: [id number]  Referring provider: Brayden Dos Santos MD  Dx:   Encounter Diagnosis     ICD-10-CM    1. Chronic right shoulder pain  M25.511     G89.29             Subjective: patient made an appt for US guided PRP injection for .       Objective: See treatment diary below      Assessment: Tolerated treatment fair. Patient frequently notes that he \"tolerates\" all exercises. Patient is on the wait list for an injection in about 2 and a half weeks. He is still having the most difficulty with abduction and external rotation. Despite hands on posture correction he is unable to maintain this position and notes increased pain with being upright. Will continue to progress as tolerated. Patient demonstrated fatigue post treatment, exhibited good technique with therapeutic exercises, and would benefit from continued PT      Plan: Continue per plan of care.      Precautions: s/p manipulation under anesthesia   Past Medical History:   Diagnosis Date    Diabetes mellitus (HCC)     Gout     High cholesterol     Tendonitis          Insurance Eval/ Re-eval POC expires Auth Status Total visits  Start date  Expiration date Misc   CMS 1/24/25 3/21/25         3/19/25 5/30/25          Date 4/2/25 4/8/25 4/10/25 4/15/25    Visit Number  [phone]    Auth                Manual        GH mobs         Tspine mobs        Scap mobs         TPR    Hands on posture assise  Repeated shoulder ext with PT OP ~5' total             Neuro Re-ed Wall clock RTB 10x  Wall clock GTB 2x10  Wall clock GTB 2x10     Scap retract  Row with lav band single arm +ER 20x Row with lav band single arm +ER 20x Row with lav band single arm +ER 20x Row with lav band single arm +ER 20x    Lav band high row palm down 2x10     Sidelying ER  Side lying ER 2# 2*10      Pulleys Wall slide with lift off RTB 20x       planks     Table planks with windmill       Wall ball with yellow " "ball 3x30      TRX         TRX pull up 20x  TRX pull up 20x TRX pull up 20x    TRX squats for shoulder flexion x20       Prone row 3x10     Prone T's - held  Prone row 3x10  Prone row 3x10     Prone I's 2x10     Body blade Body blade ER/IR 4x10\" Body blade ER/IR     Abduction   4x10\" Body blade ER/IR     Abduction   4x10\"    In flexion 3x30\" def    Standing steamboat   Open books 2x10 bilateral  Standing open books 2x10 each     Thera Ex        Active warmup Serratus punches 2# 2* 10       AAROM Ball 4 way 5 sec 20x         R BTB stretch w SOS x20   Wall slides with towel with abduction turn out 2x10     Shoulder PROM Resisted lateral walkout with arm at 90 deg IR/ER 10x Resisted lateral walkout with arm at 90 deg IR/ER 10x Resisted lateral walkout with arm at 90 deg IR/ER 10x Resisted lateral walkout with arm at 90 deg IR/ER 10x     Tricep kick back 5# 2*10 In prone 3# 2x10       pendulum Bicep curls 4# db x20     Brachialis curls^ same  Bicep curls 4# db x20     Brachialis curls^ same Bicep curls 4# db x20                     Thera Activity        Patient education        Posture education                        Modalities        Ice          \                               "

## 2025-04-15 NOTE — ASSESSMENT & PLAN NOTE
Continue PT  USGI right glenohumeral injection with Dr. Aviles - referral provided  Continue NSAIDs PRN  Orders:  •  Ambulatory Referral to Orthopedic Surgery; Future

## 2025-04-17 ENCOUNTER — OFFICE VISIT (OUTPATIENT)
Dept: PHYSICAL THERAPY | Facility: CLINIC | Age: 55
End: 2025-04-17
Payer: COMMERCIAL

## 2025-04-17 DIAGNOSIS — M25.511 CHRONIC RIGHT SHOULDER PAIN: Primary | ICD-10-CM

## 2025-04-17 DIAGNOSIS — G89.29 CHRONIC RIGHT SHOULDER PAIN: Primary | ICD-10-CM

## 2025-04-17 PROCEDURE — 97140 MANUAL THERAPY 1/> REGIONS: CPT

## 2025-04-17 PROCEDURE — 97110 THERAPEUTIC EXERCISES: CPT

## 2025-04-17 NOTE — PROGRESS NOTES
"Daily Note     Today's date: 2025  Patient name: Tony Ramsay  : 1970  MRN: [id number]  Referring provider: Brayden Dos Santos MD  Dx:   Encounter Diagnosis     ICD-10-CM    1. Chronic right shoulder pain  M25.511     G89.29         Subjective: patient reports feeling \"so so\" today.      Objective: See treatment diary below      Assessment: Tolerated treatment fair. Initiated further ACJ mobilizations including GHJ mob w movement into flexion all painful and patient unable to tolerate much more. Patient is having significant difficulty with overhead movements/exercises and tolerance to hands on mobilization. Will continue to progress as able however we are unable to progress much due to pain. Patient demonstrated fatigue post treatment and would benefit from continued PT      Plan: Continue per plan of care.      Precautions: s/p manipulation under anesthesia   Past Medical History:   Diagnosis Date    Diabetes mellitus (HCC)     Gout     High cholesterol     Tendonitis          Insurance Eval/ Re-eval POC expires Auth Status Total visits  Start date  Expiration date Misc   CMS 1/24/25 3/21/25         3/19/25 5/30/25          Date 4/2/25 4/8/25 4/10/25 4/15/25 4/17/25   Visit Number  [phone] [id number] 29 30   Auth                Manual        GH mobs      Flexion MWM ~10  ACJ gr 3-4 mob inf and post   Tspine mobs        Scap mobs         TPR    Hands on posture assise  Repeated shoulder ext with PT OP ~5' total             Neuro Re-ed Wall clock RTB 10x  Wall clock GTB 2x10  Wall clock GTB 2x10  Wall clock GTB 2x10    Scap retract  Row with lav band single arm +ER 20x Row with lav band single arm +ER 20x Row with lav band single arm +ER 20x Row with lav band single arm +ER 20x    Lav band high row palm down 2x10  Row with lav band single arm +ER 20x    Lav band high row palm down 2x10    Sidelying ER  Side lying ER 2# 2*10      Pulleys Wall slide with lift off RTB 20x       planks     Table planks with " "windmill 2x10    Table push ups 2x5      Wall ball with yellow ball 3x30      TRX         TRX pull up 20x  TRX pull up 20x TRX pull up 20x    TRX squats for shoulder flexion x20  TRX push ups 3x10      Prone row 3x10     Prone T's - held  Prone row 3x10  Prone row 3x10     Prone I's 2x10     Body blade Body blade ER/IR 4x10\" Body blade ER/IR     Abduction   4x10\" Body blade ER/IR     Abduction   4x10\"    In flexion 3x30\" def Body blade ER/IR     Abduction   4x10\"    In flexion 3x30\"   Standing steamboat   Open books 2x10 bilateral  Standing open books 2x10 each     Thera Ex        Active warmup Serratus punches 2# 2* 10       AAROM Ball 4 way 5 sec 20x         R BTB stretch w SOS x20   Wall slides with towel with abduction turn out 2x10     Shoulder PROM Resisted lateral walkout with arm at 90 deg IR/ER 10x Resisted lateral walkout with arm at 90 deg IR/ER 10x Resisted lateral walkout with arm at 90 deg IR/ER 10x Resisted lateral walkout with arm at 90 deg IR/ER 10x Resisted lateral walkout with arm at 90 deg IR/ER 10x    Tricep kick back 5# 2*10 In prone 3# 2x10       pendulum Bicep curls 4# db x20     Brachialis curls^ same  Bicep curls 4# db x20     Brachialis curls^ same Bicep curls 4# db x20                     Thera Activity        Patient education        Posture education                        Modalities        Ice               "

## 2025-04-22 ENCOUNTER — OFFICE VISIT (OUTPATIENT)
Dept: PHYSICAL THERAPY | Facility: CLINIC | Age: 55
End: 2025-04-22
Payer: COMMERCIAL

## 2025-04-22 DIAGNOSIS — M25.511 CHRONIC RIGHT SHOULDER PAIN: Primary | ICD-10-CM

## 2025-04-22 DIAGNOSIS — G89.29 CHRONIC RIGHT SHOULDER PAIN: Primary | ICD-10-CM

## 2025-04-22 PROCEDURE — 97140 MANUAL THERAPY 1/> REGIONS: CPT

## 2025-04-22 PROCEDURE — 97110 THERAPEUTIC EXERCISES: CPT

## 2025-04-22 NOTE — PROGRESS NOTES
"Daily Note     Today's date: 2025  Patient name: Tony Ramsay  : 1970  MRN: [id number]  Referring provider: Brayden Dos Santos MD  Dx:   Encounter Diagnosis     ICD-10-CM    1. Chronic right shoulder pain  M25.511     G89.29           Subjective: Patient reports feeling \"okay\" today      Objective: See treatment diary below      Assessment: Tolerated treatment fair- initiated PNF today to see how movement pattern affects patients pain and to ses quality of movement. Patient was unable to perform both D1 or D2 with extended elbow due to pain in shoulder, and unable to reach full flexion with D2. Continues to be TTP at the proximal bicep tendon. Will continue to progress as tolerated and as patient allows. Patient demonstrated fatigue post treatment, exhibited good technique with therapeutic exercises, and would benefit from continued PT      Plan: Continue per plan of care.      Precautions: s/p manipulation under anesthesia   Past Medical History:   Diagnosis Date    Diabetes mellitus (HCC)     Gout     High cholesterol     Tendonitis          Insurance Eval/ Re-eval POC expires Auth Status Total visits  Start date  Expiration date Misc   CMS 1/24/25 3/21/25         3/19/25 5/30/25          Date 4/10/25 4/15/25 4/17/25 4/22/25   Visit Number 28 29 [id number] 31   Auth              Manual       GH mobs    Flexion MWM ~10  ACJ gr 3-4 mob inf and post    Tspine mobs    STM to proximal bicep    Scap mobs        TPR  Hands on posture assise  Repeated shoulder ext with PT OP ~5' total   PNF D1 and D2 with PT OP ~15'     With varying degrees of hands on assist and verbal and tactile cues            Neuro Re-ed Wall clock GTB 2x10  Wall clock GTB 2x10  Wall clock GTB 2x10     Scap retract  Row with lav band single arm +ER 20x Row with lav band single arm +ER 20x    Lav band high row palm down 2x10  Row with lav band single arm +ER 20x    Lav band high row palm down 2x10     Sidelying ER       Pulleys       planks  " " Table planks with windmill 2x10    Table push ups 2x5     Wall ball with yellow ball 3x30        TRX pull up 20x TRX pull up 20x    TRX squats for shoulder flexion x20  TRX push ups 3x10      Prone row 3x10  Prone row 3x10     Prone I's 2x10      Body blade Body blade ER/IR     Abduction   4x10\"    In flexion 3x30\" def Body blade ER/IR     Abduction   4x10\"    In flexion 3x30\"    Standing steamboat Open books 2x10 bilateral  Standing open books 2x10 each      Thera Ex       Active warmup       AAROM    Sleeper stretch x20x5\"     Wall slides with towel with abduction turn out 2x10      Shoulder PROM Resisted lateral walkout with arm at 90 deg IR/ER 10x Resisted lateral walkout with arm at 90 deg IR/ER 10x Resisted lateral walkout with arm at 90 deg IR/ER 10x        Butterfly stretch open and close x20   pendulum Bicep curls 4# db x20     Brachialis curls^ same Bicep curls 4# db x20                    Thera Activity       Patient education       Posture education                     Modalities       Ice                "

## 2025-04-24 ENCOUNTER — OFFICE VISIT (OUTPATIENT)
Dept: PHYSICAL THERAPY | Facility: CLINIC | Age: 55
End: 2025-04-24
Attending: ORTHOPAEDIC SURGERY
Payer: COMMERCIAL

## 2025-04-24 DIAGNOSIS — M25.511 CHRONIC RIGHT SHOULDER PAIN: Primary | ICD-10-CM

## 2025-04-24 DIAGNOSIS — G89.29 CHRONIC RIGHT SHOULDER PAIN: Primary | ICD-10-CM

## 2025-04-24 PROCEDURE — 97110 THERAPEUTIC EXERCISES: CPT

## 2025-04-24 NOTE — PROGRESS NOTES
Daily Note - DISCHARGE     Today's date: 2025  Patient name: Tony Ramsay  : 1970  MRN: [id number]  Referring provider: Brayden Dos Santos MD  Dx:   Encounter Diagnosis     ICD-10-CM    1. Chronic right shoulder pain  M25.511     G89.29         Subjective: Patient reports increased soreness following last visit.       Objective: See treatment diary below      Assessment: Tolerated treatment poor. Patient becomes increasingly intolerable to exercise and manual mobilizations during physical therapy sessions. At times he is unwilling to continue certain therapeutic exercises due to pain. Overall he is at a plateau with progress. He has maintained his motion but struggles to reach end ranges. Will follow up with primary PT next week. Patient has reached therapeutic plateau. He would like to cancel next week until he sees the follow up MD ORTHO. PT and patient agreed to discharge.       Plan:DISCHARGE     Precautions: s/p manipulation under anesthesia   Past Medical History:   Diagnosis Date    Diabetes mellitus (HCC)     Gout     High cholesterol     Tendonitis          Insurance Eval/ Re-eval POC expires Auth Status Total visits  Start date  Expiration date Misc   CMS 1/24/25 3/21/25         3/19/25 5/30/25          Date 4/10/25 4/15/25 4/17/25 4/22/25    Visit Number 28 29 30 31    Auth                Manual        GH mobs    Flexion MWM ~10  ACJ gr 3-4 mob inf and post     Tspine mobs    STM to proximal bicep     Scap mobs         TPR  Hands on posture assise  Repeated shoulder ext with PT OP ~5' total   PNF D1 and D2 with PT OP ~15'     With varying degrees of hands on assist and verbal and tactile cues   Manual gait belt MWM into flexion ~5'            Neuro Re-ed Wall clock GTB 2x10  Wall clock GTB 2x10  Wall clock GTB 2x10      Scap retract  Row with lav band single arm +ER 20x Row with lav band single arm +ER 20x    Lav band high row palm down 2x10  Row with lav band single arm +ER 20x    Lav  "band high row palm down 2x10      Sidelying ER        Pulleys        planks   Table planks with windmill 2x10    Table push ups 2x5  Table planks  Table shoulder taps  Table push ups x20 each     Wall ball with yellow ball 3x30         TRX pull up 20x TRX pull up 20x    TRX squats for shoulder flexion x20  TRX push ups 3x10       Prone row 3x10  Prone row 3x10     Prone I's 2x10       Body blade Body blade ER/IR     Abduction   4x10\"    In flexion 3x30\" def Body blade ER/IR     Abduction   4x10\"    In flexion 3x30\"     Standing steamboat Open books 2x10 bilateral  Standing open books 2x10 each       Thera Ex        Active warmup        AAROM    Sleeper stretch x20x5\"      Wall slides with towel with abduction turn out 2x10       Shoulder PROM Resisted lateral walkout with arm at 90 deg IR/ER 10x Resisted lateral walkout with arm at 90 deg IR/ER 10x Resisted lateral walkout with arm at 90 deg IR/ER 10x         Butterfly stretch open and close x20    pendulum Bicep curls 4# db x20     Brachialis curls^ same Bicep curls 4# db x20                       Thera Activity        Patient education        Posture education                        Modalities        Ice                   "

## 2025-04-28 ENCOUNTER — APPOINTMENT (OUTPATIENT)
Dept: PHYSICAL THERAPY | Facility: CLINIC | Age: 55
End: 2025-04-28
Payer: COMMERCIAL

## 2025-04-30 ENCOUNTER — APPOINTMENT (OUTPATIENT)
Dept: PHYSICAL THERAPY | Facility: CLINIC | Age: 55
End: 2025-04-30
Payer: COMMERCIAL

## 2025-05-02 ENCOUNTER — PROCEDURE VISIT (OUTPATIENT)
Dept: OBGYN CLINIC | Facility: CLINIC | Age: 55
End: 2025-05-02
Payer: COMMERCIAL

## 2025-05-02 VITALS — HEIGHT: 71 IN | BODY MASS INDEX: 26.32 KG/M2 | WEIGHT: 188 LBS

## 2025-05-02 DIAGNOSIS — Z98.890 S/P ARTHROSCOPY OF RIGHT SHOULDER: ICD-10-CM

## 2025-05-02 DIAGNOSIS — M75.01 ADHESIVE CAPSULITIS OF RIGHT SHOULDER: Primary | ICD-10-CM

## 2025-05-02 PROCEDURE — 20611 DRAIN/INJ JOINT/BURSA W/US: CPT | Performed by: ORTHOPAEDIC SURGERY

## 2025-05-02 PROCEDURE — 99213 OFFICE O/P EST LOW 20 MIN: CPT | Performed by: ORTHOPAEDIC SURGERY

## 2025-05-02 RX ORDER — LIDOCAINE HYDROCHLORIDE 10 MG/ML
4 INJECTION, SOLUTION INFILTRATION; PERINEURAL
Status: COMPLETED | OUTPATIENT
Start: 2025-05-02 | End: 2025-05-02

## 2025-05-02 RX ORDER — TRIAMCINOLONE ACETONIDE 40 MG/ML
40 INJECTION, SUSPENSION INTRA-ARTICULAR; INTRAMUSCULAR
Status: COMPLETED | OUTPATIENT
Start: 2025-05-02 | End: 2025-05-02

## 2025-05-02 RX ADMIN — LIDOCAINE HYDROCHLORIDE 4 ML: 10 INJECTION, SOLUTION INFILTRATION; PERINEURAL at 13:30

## 2025-05-02 RX ADMIN — TRIAMCINOLONE ACETONIDE 40 MG: 40 INJECTION, SUSPENSION INTRA-ARTICULAR; INTRAMUSCULAR at 13:30

## 2025-05-02 NOTE — PROGRESS NOTES
"Patient Name: Tony Ramsay      : 1970       MRN: [id number]   Encounter Provider: Sergio Aviles DO   Encounter Date: 25  Encounter department: [facility]         Assessment & Plan  Adhesive capsulitis of right shoulder  Tony has right shoulder pain and reduced range of motion consistent with adhesive capsulitis.  He tolerated an ultrasound-guided glenohumeral injection again today.  Hopefully he experiences relief from this injection and can push forward with his range of motion.  Follow-up with Dr. Dos Santos in the next 4 to 6 weeks.         S/P arthroscopy of right shoulder    Orders:    Ambulatory Referral to Orthopedic Surgery           Follow-up:  No follow-ups on file.     _____________________________________________________  CHIEF COMPLAINT:  Chief Complaint   Patient presents with    Right Shoulder - Follow-up       Height 5' 11\" (1.803 m), weight 85.3 kg (188 lb).         SUBJECTIVE:  Tony Ramsay is a 54 y.o. male who presents to the office today for a scheduled ultrasound-guided glenohumeral injection in the right shoulder.  He has a history of adhesive capsulitis in the right shoulder and underwent arthroscopy and lysis of adhesions with manipulation under anesthesia with Dr. Dos Santos 3 months ago.  He had persistent pain upon follow-up and reduced range of motion so it was recommended for him to undergo a repeat glenohumeral injection today.  He has aching throbbing pain within the shoulder and pain with increased motion.    PAST MEDICAL HISTORY:  Past Medical History:   Diagnosis Date    Diabetes mellitus (HCC)     Gout     High cholesterol     Tendonitis        PAST SURGICAL HISTORY:  Past Surgical History:   Procedure Laterality Date    WA SURGICAL ARTHROSCOPY SHOULDER W/LSS&RESCJ ADS Right 2025    Procedure: ARTHROSCOPY SHOULDER, Lysis of Adhesion, Manipulation under anesthesia;  Surgeon: Brayden Dos Santos MD;  Location: [facility] OR;  " Service: Orthopedics       FAMILY HISTORY:  Family History   Family history unknown: Yes       SOCIAL HISTORY:  Social History     Tobacco Use    Smoking status: Never     Passive exposure: Never    Smokeless tobacco: Never   Vaping Use    Vaping status: Never Used   Substance Use Topics    Alcohol use: No    Drug use: No       MEDICATIONS:    Current Outpatient Medications:     ALPHA LIPOIC ACID PO, Take by mouth, Disp: , Rfl:     amoxicillin-clavulanate (AUGMENTIN) 875-125 mg per tablet, , Disp: , Rfl:     aspirin 81 mg chewable tablet, Chew 1 tablet (81 mg total) 2 (two) times a day for 28 days, Disp: 56 tablet, Rfl: 0    Berberine Chloride (BERBERINE HCI PO), Take by mouth, Disp: , Rfl:     cholecalciferol (VITAMIN D3) 400 units tablet, Take 400 Units by mouth daily, Disp: , Rfl:     Continuous Glucose Sensor (FreeStyle Christian 2 Sensor) MISC, , Disp: , Rfl:     diclofenac (VOLTAREN) 75 mg EC tablet, Take 1 tablet (75 mg total) by mouth 2 (two) times a day, Disp: 60 tablet, Rfl: 0    ezetimibe (ZETIA) 10 mg tablet, Take 10 mg by mouth daily, Disp: , Rfl:     ibuprofen (MOTRIN) 600 mg tablet, , Disp: , Rfl:     meloxicam (Mobic) 15 mg tablet, Take 1 tablet (15 mg total) by mouth daily (Patient not taking: Reported on 4/15/2025), Disp: 30 tablet, Rfl: 0    methylPREDNISolone 4 MG tablet therapy pack, Use as directed on package (Patient not taking: Reported on 4/15/2025), Disp: 1 each, Rfl: 0    omega-3-acid ethyl esters (LOVAZA) 1 g capsule, Take 1 g by mouth daily, Disp: , Rfl:     ondansetron (ZOFRAN) 4 mg tablet, Take 1 tablet (4 mg total) by mouth every 8 (eight) hours as needed for nausea or vomiting (Patient not taking: Reported on 2/5/2025), Disp: 20 tablet, Rfl: 0    Synjardy XR 5-1000 MG TB24, , Disp: , Rfl:     traMADol (Ultram) 50 mg tablet, Take 1 tablet (50 mg total) by mouth every 6 (six) hours as needed for moderate pain for up to 20 doses (Patient not taking: Reported on 4/15/2025), Disp: 20 tablet,  "Rfl: 0    Turmeric 500 MG CAPS, Take by mouth, Disp: , Rfl:     vitamin B-12 (CYANOCOBALAMIN) 50 MCG tablet, Take by mouth daily, Disp: , Rfl:     ALLERGIES:  No Known Allergies      _____________________________________________________  Review of Systems     Right Shoulder Exam     Range of Motion   Active abduction:  100 abnormal   External rotation:  30 abnormal   Forward flexion:  110 abnormal              Physical Exam  Vitals reviewed.   Constitutional:       Appearance: He is well-developed.   HENT:      Head: Normocephalic and atraumatic.   Eyes:      Conjunctiva/sclera: Conjunctivae normal.      Pupils: Pupils are equal, round, and reactive to light.   Cardiovascular:      Rate and Rhythm: Normal rate.      Pulses: Normal pulses.   Pulmonary:      Effort: Pulmonary effort is normal. No respiratory distress.   Musculoskeletal:      Cervical back: Normal range of motion and neck supple.      Comments: As noted in HPI   Skin:     General: Skin is warm and dry.   Neurological:      General: No focal deficit present.      Mental Status: He is alert and oriented to person, place, and time.   Psychiatric:         Mood and Affect: Mood normal.         Behavior: Behavior normal.        _____________________________________________________  STUDIES REVIEWED:  I personally reviewed the pertinent images and my independent interpretation is as follows:      PROCEDURES PERFORMED:  Large joint arthrocentesis: R glenohumeral  Verner Protocol:  Consent: Verbal consent obtained.  Risks and benefits: risks, benefits and alternatives were discussed  Consent given by: patient  Time out: Immediately prior to procedure a \"time out\" was called to verify the correct patient, procedure, equipment, support staff and site/side marked as required.  Site marked: the operative site was marked  Supporting Documentation  Indications: pain     Is this a Visco injection? NoProcedure Details  Location: shoulder - R " glenohumeral  Preparation: Patient was prepped and draped in the usual sterile fashion  Needle size: 25 G  Ultrasound guidance: yes  Approach: posterior  Medications administered: 4 mL lidocaine 1 %; 40 mg triamcinolone acetonide 40 mg/mL    Patient tolerance: patient tolerated the procedure well with no immediate complications  Dressing:  Sterile dressing applied              This document was created using speech voice recognition software.   Grammatical errors, random word insertions, pronoun errors, and incomplete sentences are an occasional consequence of this system due to software limitations, ambient noise, and hardware issues.   Any formal questions or concerns about content, text, or information contained within the body of this dictation should be directly addressed to the provider for clarification.

## 2025-05-02 NOTE — ASSESSMENT & PLAN NOTE
Tony has right shoulder pain and reduced range of motion consistent with adhesive capsulitis.  He tolerated an ultrasound-guided glenohumeral injection again today.  Hopefully he experiences relief from this injection and can push forward with his range of motion.  Follow-up with Dr. Dos Santos in the next 4 to 6 weeks.

## 2025-05-02 NOTE — ASSESSMENT & PLAN NOTE
Orders:    Ambulatory Referral to Orthopedic Surgery     PROVIDER:[TOKEN:[[id number]],FOLLOWUP:[2 weeks],ESTABLISHEDPATIENT:[T]] PROVIDER:[TOKEN:[[id number]],SCHEDULEDAPPT:[01/09/2025],SCHEDULEDAPPTTIME:[10:20 AM],ESTABLISHEDPATIENT:[T]],PROVIDER:[TOKEN:[[id number]],SCHEDULEDAPPT:[01/13/2025],SCHEDULEDAPPTTIME:[09:00 AM]]

## 2025-05-13 ENCOUNTER — OFFICE VISIT (OUTPATIENT)
Dept: PHYSICAL THERAPY | Facility: CLINIC | Age: 55
End: 2025-05-13
Attending: ORTHOPAEDIC SURGERY
Payer: COMMERCIAL

## 2025-05-13 DIAGNOSIS — G89.29 CHRONIC RIGHT SHOULDER PAIN: Primary | ICD-10-CM

## 2025-05-13 DIAGNOSIS — M25.511 CHRONIC RIGHT SHOULDER PAIN: Primary | ICD-10-CM

## 2025-05-13 PROCEDURE — 97530 THERAPEUTIC ACTIVITIES: CPT

## 2025-05-13 NOTE — PROGRESS NOTES
"Daily Note     Today's date: 2025  Patient name: Tony Ramsay  : 1970  MRN: [id number]  Referring provider: Brayden Dos Santos MD  Dx:   Encounter Diagnosis     ICD-10-CM    1. Chronic right shoulder pain  M25.511     G89.29           Subjective: patient presents s/p injection almost 2 weeks ago- with Dr Aviles. He reports continued pain and stiffness into the R shoulder. At home has been doing \"normal household stuff\". Is not doing HEP as instructed in clinic. Would like hep update and walk through    Objective: See treatment diary below    Patient consistently at about 80% of full range of motion of the R shoulder.        Flexion: 170  Abduction: 165    BTB: L1  BTH: CTJ  Assessment: patient provided with comprehensive HEP and two different strength resistance bands today to keep up with HEP at home as he has reached therapeutic plateau in clinic and should be well able to maintain gains in motion post injection at home with HEP.     Plan: discharge-      Precautions: s/p manipulation under anesthesia   Past Medical History:   Diagnosis Date    Diabetes mellitus (HCC)     Gout     High cholesterol     Tendonitis          Insurance Eval/ Re-eval POC expires Auth Status Total visits  Start date  Expiration date Misc   CMS 1/24/25 3/21/25         3/19/25 5/30/25          Date 4/10/25 4/15/25 4/17/25 4/22/25 4/24/25    Visit Number 28 29 30 31 32    Auth                  Manual         GH mobs    Flexion MWM ~10  ACJ gr 3-4 mob inf and post      Tspine mobs    STM to proximal bicep      Scap mobs          TPR  Hands on posture assise  Repeated shoulder ext with PT OP ~5' total   PNF D1 and D2 with PT OP ~15'     With varying degrees of hands on assist and verbal and tactile cues   Manual gait belt MWM into flexion ~5'              Neuro Re-ed Wall clock GTB 2x10  Wall clock GTB 2x10  Wall clock GTB 2x10       Scap retract  Row with lav band single arm +ER 20x Row with lav band single arm +ER " "20x    Lav band high row palm down 2x10  Row with lav band single arm +ER 20x    Lav band high row palm down 2x10       Sidelying ER         Pulleys         planks   Table planks with windmill 2x10    Table push ups 2x5  Table planks  Table shoulder taps  Table push ups x20 each      Wall ball with yellow ball 3x30          TRX pull up 20x TRX pull up 20x    TRX squats for shoulder flexion x20  TRX push ups 3x10        Prone row 3x10  Prone row 3x10     Prone I's 2x10        Body blade Body blade ER/IR     Abduction   4x10\"    In flexion 3x30\" def Body blade ER/IR     Abduction   4x10\"    In flexion 3x30\"      Standing steamboat Open books 2x10 bilateral  Standing open books 2x10 each        Thera Ex         Active warmup         AAROM    Sleeper stretch x20x5\"       Wall slides with towel with abduction turn out 2x10        Shoulder PROM Resisted lateral walkout with arm at 90 deg IR/ER 10x Resisted lateral walkout with arm at 90 deg IR/ER 10x Resisted lateral walkout with arm at 90 deg IR/ER 10x          Butterfly stretch open and close x20     pendulum Bicep curls 4# db x20     Brachialis curls^ same Bicep curls 4# db x20                          Thera Activity         Patient education         Posture education                           Modalities         Ice                      "

## 2025-06-04 ENCOUNTER — OFFICE VISIT (OUTPATIENT)
Dept: OBGYN CLINIC | Facility: CLINIC | Age: 55
End: 2025-06-04
Payer: COMMERCIAL

## 2025-06-04 VITALS — BODY MASS INDEX: 26.32 KG/M2 | HEIGHT: 71 IN | WEIGHT: 188 LBS

## 2025-06-04 DIAGNOSIS — M54.2 CERVICALGIA: ICD-10-CM

## 2025-06-04 DIAGNOSIS — Z98.890 S/P ARTHROSCOPY OF RIGHT SHOULDER: Primary | ICD-10-CM

## 2025-06-04 PROCEDURE — 99214 OFFICE O/P EST MOD 30 MIN: CPT | Performed by: ORTHOPAEDIC SURGERY

## 2025-06-04 RX ORDER — CELECOXIB 200 MG/1
200 CAPSULE ORAL 2 TIMES DAILY
Qty: 60 CAPSULE | Refills: 1 | Status: SHIPPED | OUTPATIENT
Start: 2025-06-04

## 2025-06-04 NOTE — ASSESSMENT & PLAN NOTE
DOS 1/23/25  Orders:  •  celecoxib (CeleBREX) 200 mg capsule; Take 1 capsule (200 mg total) by mouth 2 (two) times a day   Hypoxia

## 2025-06-04 NOTE — PROGRESS NOTES
Patient Name: Tony Ramsay      : 1970       MRN: [id number]   Encounter Provider: Brayden Dos Santos MD   Encounter Date: 25  Encounter department: [facility] [place]         Assessment & Plan  S/P arthroscopy of right shoulder  DOS 25  Orders:  •  celecoxib (CeleBREX) 200 mg capsule; Take 1 capsule (200 mg total) by mouth 2 (two) times a day    Cervicalgia    Orders:  •  Ambulatory referral to Spine & Pain Management; Future       Plan:  54-year-old male presents now about 4 months status post shoulder arthroscopy, lysis of adhesions and manipulation under anesthesia.  His motion today is actually very good.  He still has pain in his shoulder but the injection has helped with that.  He also has neck pain and pain rating down bilateral trapezius muscle into his shoulders as well.  He was given a prescription for Celebrex to help with the pain and inflammation.  He may continue with home exercises and physical therapy.  I will also refer him to spine pain for evaluation of his cervical spine.  He may follow-up in 6 to 8 weeks for reevaluation or as needed if he continues to improve.  All questions answered today.    Follow-up:  No follow-ups on file.     _____________________________________________________  CHIEF COMPLAINT:  Chief Complaint   Patient presents with   • Right Shoulder - Follow-up         SUBJECTIVE:  Tony Ramsay is a 54 y.o. male who presents for follow up evaluation no 4 months S/P right shoulder arthroscopy, lysis of adhesion, manipulation under anesthesia. DOS 25. Since his last visit patient underwent a ultra sound guided corticosteroid injection with my partner Dr Aviles. He notes some relief of pain. He has not returned to physical therapy since 25.     PAST MEDICAL HISTORY:  Past Medical History[1]    PAST SURGICAL HISTORY:  Past Surgical History[2]    FAMILY HISTORY:  Family History[3]    SOCIAL HISTORY:  Social  "History[4]    MEDICATIONS:  Current Medications[5]    ALLERGIES:  Allergies[6]    LABS:  HgA1c:   Lab Results   Component Value Date    HGBA1C 7.0 (H) 01/06/2025     BMP:   Lab Results   Component Value Date    CALCIUM 9.0 01/06/2025    K 4.3 01/06/2025    CO2 28 01/06/2025     01/06/2025    BUN 19 01/06/2025    CREATININE 1.00 01/06/2025     CBC: No components found for: \"CBC\"    _____________________________________________________  Review of Systems   Constitutional:  Negative for chills and fever.   HENT:  Negative for ear pain and sore throat.    Eyes:  Negative for pain and visual disturbance.   Respiratory:  Negative for cough and shortness of breath.    Cardiovascular:  Negative for chest pain and palpitations.   Gastrointestinal:  Negative for abdominal pain and vomiting.   Genitourinary:  Negative for dysuria and hematuria.   Musculoskeletal:  Negative for arthralgias and back pain.   Skin:  Negative for color change and rash.   Neurological:  Negative for seizures and syncope.   All other systems reviewed and are negative.       Right Shoulder Exam     Tenderness   The patient is experiencing no tenderness.    Muscle Strength   Abduction: 5/5   Internal rotation: 5/5   External rotation: 5/5   Supraspinatus: 5/5   Subscapularis: 5/5   Biceps: 5/5     Other   Erythema: absent  Sensation: normal  Pulse: present    Comments:      Right shoulder external rotation: 50° passively.   Right shoulder forward flexion: 160° passively.   Internal rotation 0 degrees:  L5   demonstrates normal elbow, wrist, and finger motion  2+  distal radial pulse with brisk capillary refill to the fingers  Radial, median, ulnar and motor  and sensory distribution intact  Sensation to light touch intact distally                Physical Exam  Vitals and nursing note reviewed.   Constitutional:       Appearance: Normal appearance.   HENT:      Head: Normocephalic and atraumatic.      Right Ear: External ear normal.      Left Ear: " External ear normal.     Eyes:      Extraocular Movements: Extraocular movements intact.      Conjunctiva/sclera: Conjunctivae normal.       Cardiovascular:      Rate and Rhythm: Normal rate.      Pulses: Normal pulses.   Pulmonary:      Effort: Pulmonary effort is normal.     Musculoskeletal:         General: Normal range of motion.      Cervical back: Normal range of motion and neck supple.      Comments: See ortho exam     Skin:     General: Skin is warm and dry.     Neurological:      General: No focal deficit present.      Mental Status: He is alert.     Psychiatric:         Behavior: Behavior normal.        _____________________________________________________  STUDIES REVIEWED:  I personally reviewed the pertinent images and my independent interpretation is as follows:  No new images obtained today     PROCEDURES PERFORMED:  No procedures performed today.    Scribe Attestation    I,:  Clare Walter am acting as a scribe while in the presence of the attending physician.:       I,:  Brayden Dos Santos MD personally performed the services described in this documentation    as scribed in my presence.:                    [1]  Past Medical History:  Diagnosis Date   • Diabetes mellitus (HCC)    • Gout    • High cholesterol    • Tendonitis    [2]  Past Surgical History:  Procedure Laterality Date   • MD SURGICAL ARTHROSCOPY SHOULDER W/LSS&RESCJ ADS Right 01/23/2025    Procedure: ARTHROSCOPY SHOULDER, Lysis of Adhesion, Manipulation under anesthesia;  Surgeon: Brayden Dos Santos MD;  Location: [facility];  Service: Orthopedics   [3]  Family History  Family history unknown: Yes   [4]  Social History  Tobacco Use   • Smoking status: Never     Passive exposure: Never   • Smokeless tobacco: Never   Vaping Use   • Vaping status: Never Used   Substance Use Topics   • Alcohol use: No   • Drug use: No   [5]    Current Outpatient Medications:   •  ALPHA LIPOIC ACID PO, Take by mouth, Disp: , Rfl:   •  Berberine Chloride  (BERBERINE HCI PO), Take by mouth, Disp: , Rfl:   •  celecoxib (CeleBREX) 200 mg capsule, Take 1 capsule (200 mg total) by mouth 2 (two) times a day, Disp: 60 capsule, Rfl: 1  •  cholecalciferol (VITAMIN D3) 400 units tablet, Take 400 Units by mouth in the morning., Disp: , Rfl:   •  Continuous Glucose Sensor (FreeStyle Christian 2 Sensor) MISC, , Disp: , Rfl:   •  diclofenac (VOLTAREN) 75 mg EC tablet, Take 1 tablet (75 mg total) by mouth 2 (two) times a day, Disp: 60 tablet, Rfl: 0  •  ezetimibe (ZETIA) 10 mg tablet, Take 10 mg by mouth in the morning., Disp: , Rfl:   •  meloxicam (Mobic) 15 mg tablet, Take 1 tablet (15 mg total) by mouth daily (Patient taking differently: Take 15 mg by mouth if needed), Disp: 30 tablet, Rfl: 0  •  omega-3-acid ethyl esters (LOVAZA) 1 g capsule, Take 1 g by mouth in the morning., Disp: , Rfl:   •  Synjardy XR 5-1000 MG TB24, , Disp: , Rfl:   •  Turmeric 500 MG CAPS, Take by mouth, Disp: , Rfl:   •  vitamin B-12 (CYANOCOBALAMIN) 50 MCG tablet, Take by mouth in the morning., Disp: , Rfl:   •  amoxicillin-clavulanate (AUGMENTIN) 875-125 mg per tablet, , Disp: , Rfl:   •  aspirin 81 mg chewable tablet, Chew 1 tablet (81 mg total) 2 (two) times a day for 28 days, Disp: 56 tablet, Rfl: 0  •  ibuprofen (MOTRIN) 600 mg tablet, , Disp: , Rfl:   •  methylPREDNISolone 4 MG tablet therapy pack, Use as directed on package (Patient not taking: Reported on 4/15/2025), Disp: 1 each, Rfl: 0  •  ondansetron (ZOFRAN) 4 mg tablet, Take 1 tablet (4 mg total) by mouth every 8 (eight) hours as needed for nausea or vomiting (Patient not taking: Reported on 2/5/2025), Disp: 20 tablet, Rfl: 0  •  traMADol (Ultram) 50 mg tablet, Take 1 tablet (50 mg total) by mouth every 6 (six) hours as needed for moderate pain for up to 20 doses (Patient not taking: Reported on 4/15/2025), Disp: 20 tablet, Rfl: 0[6]  No Known Allergies

## 2025-06-23 ENCOUNTER — OFFICE VISIT (OUTPATIENT)
Dept: PAIN MEDICINE | Facility: CLINIC | Age: 55
End: 2025-06-23
Payer: COMMERCIAL

## 2025-06-23 ENCOUNTER — APPOINTMENT (OUTPATIENT)
Dept: RADIOLOGY | Facility: CLINIC | Age: 55
End: 2025-06-23
Attending: STUDENT IN AN ORGANIZED HEALTH CARE EDUCATION/TRAINING PROGRAM
Payer: COMMERCIAL

## 2025-06-23 DIAGNOSIS — M25.512 ACUTE PAIN OF LEFT SHOULDER: Primary | ICD-10-CM

## 2025-06-23 DIAGNOSIS — Z98.890 S/P ARTHROSCOPY OF RIGHT SHOULDER: ICD-10-CM

## 2025-06-23 DIAGNOSIS — M25.512 ACUTE PAIN OF LEFT SHOULDER: ICD-10-CM

## 2025-06-23 DIAGNOSIS — M47.812 CERVICAL SPONDYLOSIS: ICD-10-CM

## 2025-06-23 DIAGNOSIS — M54.2 CERVICALGIA: ICD-10-CM

## 2025-06-23 PROCEDURE — 99204 OFFICE O/P NEW MOD 45 MIN: CPT | Performed by: STUDENT IN AN ORGANIZED HEALTH CARE EDUCATION/TRAINING PROGRAM

## 2025-06-23 PROCEDURE — 73030 X-RAY EXAM OF SHOULDER: CPT

## 2025-06-23 RX ORDER — METHOCARBAMOL 500 MG/1
500 TABLET, FILM COATED ORAL 2 TIMES DAILY PRN
Qty: 14 TABLET | Refills: 0 | Status: SHIPPED | OUTPATIENT
Start: 2025-06-23 | End: 2025-06-30

## 2025-06-23 RX ORDER — NAPROXEN 500 MG/1
500 TABLET ORAL 2 TIMES DAILY PRN
Qty: 14 TABLET | Refills: 0 | Status: SHIPPED | OUTPATIENT
Start: 2025-06-23 | End: 2025-06-30

## 2025-06-23 NOTE — PROGRESS NOTES
Assessment:  1. Acute pain of left shoulder    2. Cervicalgia    3. S/P arthroscopy of right shoulder    4. Cervical spondylosis    Patient is a pleasant 54-year-old male who presents as a new patient visit with a year and 4 months of Neck pain with left-sided radicular symptoms.  Over the past month the intensity of the pain has been severe and he rates pain currently 7 out of 10 on numeric rating scale.  The pain occurs constantly and describes as a burning, shooting, sharp, pressure-like, throbbing, dull ache sensation.  He does have some weakness in upper extremities.  Patient was recently seen by Dr. Dos Santos on 6/4/2025 as he is status post arthroscopy of the right shoulder and was given a prescription for Celebrex.  Activities increase pain include sitting.  Patient has taken tramadol in the past which was helpful.  He also uses Lidoderm patches along with Salonpas which are helpful.  He has tried Celebrex, ibuprofen meloxicam and diclofenac with no benefit.  Patient has not started physical therapy yet for his neck but has done an extensive amount of physical therapy for his right shoulder.  He does use ice which provides moderate relief.  Patient does have x-ray of the cervical spine from October 2024 with did show mild multilevel degenerative changes with preserved disc height. Imaging independently reviewed and discussed with patient.     On examination patient with mild tenderness to palpation midline cervical spine and left cervical paraspinal muscles.  Passive range of motion of the left shoulder elicits some pain in patient with pain with liftoff and difficulty with this maneuver.  Symptoms likely related to his shoulder as opposed to his cervical spondylosis.  To further evaluate we will get an x-ray of the left shoulder and place a new prescription for PT focusing on his neck pain and left shoulder pain.  Patient is established with Dr. Dos Santos and will follow-up.    Plan:  Start naproxen 500 mg  BID prn   Start robaxin 500 mg BID prn  Ambulatory referral to PT for neck and left shoulder pain  Follow up in six weeks if symptoms persist   Orders Placed This Encounter   Procedures   • XR shoulder 2+ vw left     Standing Status:   Future     Number of Occurrences:   1     Expected Date:   6/23/2025     Expiration Date:   6/23/2029     Scheduling Instructions:      Bring along any outside films relating to this procedure.         • Ambulatory referral to Physical Therapy     Standing Status:   Future     Expiration Date:   6/23/2026     Referral Priority:   Routine     Referral Type:   Physical Therapy     Referral Reason:   Specialty Services Required     Requested Specialty:   Physical Therapy     Number of Visits Requested:   1     Expiration Date:   6/23/2026       New Medications Ordered This Visit   Medications   • naproxen (NAPROSYN) 500 mg tablet     Sig: Take 1 tablet (500 mg total) by mouth 2 (two) times a day as needed for mild pain for up to 7 days     Dispense:  14 tablet     Refill:  0   • methocarbamol (ROBAXIN) 500 mg tablet     Sig: Take 1 tablet (500 mg total) by mouth 2 (two) times a day as needed for muscle spasms for up to 7 days     Dispense:  14 tablet     Refill:  0       My impressions and treatment recommendations were discussed in detail with the patient, who verbalized understanding and had no further questions.      Follow-up is planned in six weeks time or sooner as warranted.  Discharge instructions were provided. I personally saw and examined the patient and I agree with the above discussed plan of care.    History of Present Illness:    Tony Ramsay is a 54 y.o. male who presents to [facility] for initial evaluation of the above stated pain complaints. The patient has a past medical and chronic pain history as outlined in the assessment section. He was referred by Brayden Dos Santos MD  [address]  [facility]   [place] [address].    Patient is a pleasant 54-year-old male who presents as a new patient visit with a year and 4 months of Neck pain with left-sided radicular symptoms.  Over the past month the intensity of the pain has been severe and he rates pain currently 7 out of 10 on numeric rating scale.  The pain occurs constantly and describes as a burning, shooting, sharp, pressure-like, throbbing, dull ache sensation.  He does have some weakness in upper extremities.  Patient was recently seen by Dr. Dos Santos on 6/4/2025 as he is status post arthroscopy of the right shoulder and was given a prescription for Celebrex.  Activities increase pain include sitting.  Patient has taken tramadol in the past which was helpful.  He also uses Lidoderm patches along with Salonpas which are helpful.  He has tried Celebrex, ibuprofen meloxicam and diclofenac with no benefit.  Patient has not started physical therapy yet for his neck but has done an extensive amount of physical therapy for his right shoulder.  He does use ice which provides moderate relief.  Patient does have x-ray of the cervical spine from October 2024 with did show mild multilevel degenerative changes with preserved disc height. Imaging independently reviewed and discussed iwth patient.     Review of Systems:    Review of Systems   Constitutional:  Negative for chills and fatigue.   HENT:  Negative for ear pain, mouth sores and sinus pressure.    Eyes:  Negative for pain, redness and visual disturbance.   Respiratory:  Negative for shortness of breath and wheezing.    Cardiovascular:  Negative for chest pain and palpitations.   Gastrointestinal:  Negative for abdominal pain and nausea.   Endocrine: Negative for polyphagia.   Musculoskeletal:  Positive for neck pain and neck stiffness. Negative for arthralgias and back pain.        Decreased Rom, joint and muscle pain   Skin:  Negative for wound.   Neurological:  Positive for weakness. Negative for seizures.    Psychiatric/Behavioral:  Positive for sleep disturbance. Negative for dysphoric mood.            Past Medical History[1]    Past Surgical History[2]    Family History[3]    Social History     Occupational History   • Not on file   Tobacco Use   • Smoking status: Never     Passive exposure: Never   • Smokeless tobacco: Never   Vaping Use   • Vaping status: Never Used   Substance and Sexual Activity   • Alcohol use: No   • Drug use: No   • Sexual activity: Not Currently     Partners: Female       Current Medications[4]    Allergies[5]    Physical Exam:    There were no vitals taken for this visit.    Constitutional: normal, well developed, well nourished, alert, in no distress and non-toxic and no overt pain behavior.  Eyes: anicteric  HEENT: grossly intact  Neck: supple, symmetric, trachea midline and no masses   Pulmonary:even and unlabored  Cardiovascular:No edema or pitting edema present  Skin:Normal without rashes or lesions and well hydrated  Psychiatric:Mood and affect appropriate  Neurologic:Cranial Nerves II-XII grossly intact  Musculoskeletal:normal gait. Pain with passive ROM of left arm and shoulder. TTP in midline cervical spine and left cervical paraspinal muscles. Pain with liftoff. Negative empty can.     Imaging   XR spine cervical 2 or 3 vw injury  Status: Final result     PACS Images - GE     Show images for XR spine cervical 2 or 3 vw injury  PACS Images - Sectra     Show images for XR spine cervical 2 or 3 vw injury  Study Result    Narrative & Impression         CERVICAL SPINE     INDICATION:   Cervicalgia.      COMPARISON:  None.     VIEWS:  XR SPINE CERVICAL 2 OR 3 VW INJURY   Images: 2     FINDINGS:     No acute fracture or subluxation.      Anatomic alignment.     Mild multilevel vertebral body endplate spurring without disc height loss.     Normal prevertebral soft tissues.       Clear lung apices.     IMPRESSION:        No acute osseous abnormality.     Degenerative changes as above.    "  Electronically signed: 10/24/2024 11:50 AM Stacey Alanis, MPH,MD     Imaging    XR spine cervical 2 or 3 vw injury (Order: [id number]) - 10/24/2024    Result History    XR spine cervical 2 or 3 vw injury (Order #[id number]) on 10/24/2024 - Order Result History Report    Order Report     Order Details  Result Information    Status Priority Source   Final result (10/24/2024 11:50 AM) Routine      Reason for Exam    Dx: Neck pain [M54.2 (ICD-10-CM)]     All Reviewers List    Sergio Aviles DO on 10/24/2024  4:27 PM       XR spine cervical 2 or 3 vw injury: Patient Communication     Add Comments   Seen     Signed by    Signed Date/Time  Phone Pager   STACEY ALANIS 10/24/2024 11:50 [phone]      Exam Information    Status Exam Begun  Exam Ended Performing Tech   Final [99] 10/24/2024 10:47 10/24/2024 11:05 AM MELISSA TURPIN [[id number]]     Questionnaire        Question Answer Comment   1. When should the test be performed?           End Exam      IMAGING END EXAM XRAY    Question Answer Comment   1. Script Info/History/Comments: Neck pain    2. Any additional comments the Radiologist needs to know?     3. Was the patient shielded? No    4. Was fluoro used? No    5. Fluoro time? Please type \"MINUTES\" or \"SECONDS\" following your time.     6. Were all the images in this exam within the acceptable exposure index range as posted? Yes    7. If No, provide additional information why (ie which view or position, fixed or AEC, wall/table/tabletop, etc)     8. Number of images sent to PACS: 2    9. Was jewelry removed from the patient? No    10. Jewelry removed:           PATIENT EDUCATION    Question Answer Comment   1. Was the patient educated? Yes    2. Why was the patient not educated?          External Results Report    Open External Results Report    Encounter    View Encounter             Sedation Documentation Timeline (10/24/2024 00:00 to 10/24/2024 11:06)    An end event has not been filed for " the most recent intervention. Due to this intervention’s length, all data may not appear below. To see all data, file an end event.  10/24/2024  10/24/2024 Event By   10:32:30 Discharge Orders Placed BK   Imaging  - XR spine cervical 2 or 3 vw injury         10:33:05 Discharge Order Performed    XR spine cervical 2 or 3 vw injury  - ID: [id number]         11:01:14 Discharge Orders Placed BK   Imaging  - MRI shoulder right wo contrast           Pre-op Summary    Pre-op           Recovery Summary    Recovery             Routing History    None         No orders to display       Orders Placed This Encounter   Procedures   • XR shoulder 2+ vw left   • Ambulatory referral to Physical Therapy            [1]  Past Medical History:  Diagnosis Date   • Diabetes mellitus (HCC)    • Gout    • High cholesterol    • Tendonitis    [2]  Past Surgical History:  Procedure Laterality Date   • [place] SURGICAL ARTHROSCOPY SHOULDER W/LSS&RESCJ ADS Right 01/23/2025    Procedure: ARTHROSCOPY SHOULDER, Lysis of Adhesion, Manipulation under anesthesia;  Surgeon: Brayden Dos Santos MD;  Location: [facility];  Service: Orthopedics   [3]  Family History  Family history unknown: Yes   [4]    Current Outpatient Medications:   •  ALPHA LIPOIC ACID PO, Take by mouth, Disp: , Rfl:   •  Berberine Chloride (BERBERINE HCI PO), Take by mouth, Disp: , Rfl:   •  cholecalciferol (VITAMIN D3) 400 units tablet, Take 400 Units by mouth in the morning., Disp: , Rfl:   •  Continuous Glucose Sensor (FreeStyle Christian 2 Sensor) MISC, , Disp: , Rfl:   •  ezetimibe (ZETIA) 10 mg tablet, Take 10 mg by mouth in the morning., Disp: , Rfl:   •  methocarbamol (ROBAXIN) 500 mg tablet, Take 1 tablet (500 mg total) by mouth 2 (two) times a day as needed for muscle spasms for up to 7 days, Disp: 14 tablet, Rfl: 0  •  naproxen (NAPROSYN) 500 mg tablet, Take 1 tablet (500 mg total) by mouth 2 (two) times a day as needed for mild pain for up to 7 days, Disp: 14 tablet, Rfl: 0  •   omega-3-acid ethyl esters (LOVAZA) 1 g capsule, Take 1 g by mouth in the morning., Disp: , Rfl:   •  Synjardy XR 5-1000 MG TB24, , Disp: , Rfl:   •  Turmeric 500 MG CAPS, Take by mouth, Disp: , Rfl:   •  vitamin B-12 (CYANOCOBALAMIN) 50 MCG tablet, Take by mouth in the morning., Disp: , Rfl:   •  amoxicillin-clavulanate (AUGMENTIN) 875-125 mg per tablet, , Disp: , Rfl:   •  aspirin 81 mg chewable tablet, Chew 1 tablet (81 mg total) 2 (two) times a day for 28 days, Disp: 56 tablet, Rfl: 0  •  celecoxib (CeleBREX) 200 mg capsule, Take 1 capsule (200 mg total) by mouth 2 (two) times a day (Patient not taking: Reported on 6/23/2025), Disp: 60 capsule, Rfl: 1  •  diclofenac (VOLTAREN) 75 mg EC tablet, Take 1 tablet (75 mg total) by mouth 2 (two) times a day (Patient not taking: Reported on 6/23/2025), Disp: 60 tablet, Rfl: 0  •  ibuprofen (MOTRIN) 600 mg tablet, , Disp: , Rfl:   •  meloxicam (Mobic) 15 mg tablet, Take 1 tablet (15 mg total) by mouth daily (Patient not taking: Reported on 6/23/2025), Disp: 30 tablet, Rfl: 0  •  methylPREDNISolone 4 MG tablet therapy pack, Use as directed on package (Patient not taking: Reported on 4/15/2025), Disp: 1 each, Rfl: 0  •  ondansetron (ZOFRAN) 4 mg tablet, Take 1 tablet (4 mg total) by mouth every 8 (eight) hours as needed for nausea or vomiting (Patient not taking: Reported on 2/5/2025), Disp: 20 tablet, Rfl: 0  •  traMADol (Ultram) 50 mg tablet, Take 1 tablet (50 mg total) by mouth every 6 (six) hours as needed for moderate pain for up to 20 doses (Patient not taking: Reported on 4/15/2025), Disp: 20 tablet, Rfl: 0[5]  No Known Allergies

## 2025-07-01 ENCOUNTER — TELEPHONE (OUTPATIENT)
Age: 55
End: 2025-07-01

## 2025-07-01 NOTE — TELEPHONE ENCOUNTER
Caller: cynthia    Doctor: Dr. campa    Reason for call: cynthia is stating pt doesn't need auth for his mri. Ref# [id number]    Call back#: [phone]

## 2025-07-02 ENCOUNTER — EVALUATION (OUTPATIENT)
Dept: PHYSICAL THERAPY | Facility: CLINIC | Age: 55
End: 2025-07-02
Attending: STUDENT IN AN ORGANIZED HEALTH CARE EDUCATION/TRAINING PROGRAM
Payer: COMMERCIAL

## 2025-07-02 DIAGNOSIS — M54.2 CERVICALGIA: ICD-10-CM

## 2025-07-02 PROCEDURE — 97110 THERAPEUTIC EXERCISES: CPT

## 2025-07-02 PROCEDURE — 97162 PT EVAL MOD COMPLEX 30 MIN: CPT

## 2025-07-02 NOTE — PROGRESS NOTES
PT Evaluation   Today's date: 2025  Patient name: Tony Ramsay  : 1970  MRN: [id number]  Referring provider: Iván Mack MD  Dx:   Encounter Diagnosis     ICD-10-CM    1. Cervicalgia  M54.2 Ambulatory referral to Physical Therapy              Assessment  Assessment details: Patient is a 54 y.o. Male who presents with referring diagnosis of cervicalgia. Patient's greatest concern is the pain he is experiencing, worry over not knowing what's wrong, concern at no signs of improvement, wanting to avoid surgery, and fear of not being able to keep active.    Primary movement impairment diagnosis of hypomobility of his CS w/ potential directional preference resulting in pathoanatomical symptoms of pain, decreased ROM, proprioception, power production, and function. The aforementioned impairments have limited the patient's ability to transfer, mobilize, tolerate activity, prolonged positions, and perform ADLs. No further referral appears necessary at this time based upon examination results    Patient education performed during today's session included: POC, prognosis    Primary movement impairments:  1) Hypomobility of his CS        Impairments: Abnormal or restricted ROM, Activity intolerance, Impaired physical strength, Lacks appropriate HEP, Poor posture, Poor body mechanics, and Pain with function  Understanding of Dx/Px/POC: Good  Prognosis: Good   Positive prognostic factors: Age   Negative prognostic factors: Chronicity of symptoms, no mechanical changes during examination    Patient verbalized understanding of POC.         Please contact me if you have any questions or recommendations. Thank you for the referral and the opportunity to share in Tony Ramsay's care.        Plan  Patient would benefit from: Skilled PT  Planned modality interventions: Biofeedback  Planned therapy interventions: Body mechanics training,  Coordination, Dry Needling, Functional ROM exercises, HEP, Joint mobilization, Manual therapy, Motor coordination training, Neuromuscular re-education, Patient education, Postural training, Strengthening, Stretching, Therapeutic activities, and Therapeutic exercises  Frequency: 1-2x/week  Duration in weeks: 10-12  Plan of Care beginning date: 7/2/25  Plan of Care expiration date: 3 months - 10/2/2025  Treatment plan discussed with: Patient       Goals  Short Term Goals (1-3 weeks):    - Patient will be independent in basic HEP 2-3 weeks  - Patient will report >50% reduction in pain  - Patient will demonstrate >1/3 improvement in MMT grade as applicable  - Patient will be able to reach OH w/o less than 4/10 pain    Long Term Goals (4-6 weeks):  - Patient will be independent in a comprehensive home exercise program  - Patient FOTO score will improve beyond goal score  - Patient will self-report > 80% improvement in function  - Patient will be able to perform ADL's w/o limitations  - Patient will achieve full shoulder ROM w/o limitations  - Patient will be able to achieve at least min loss of ROM for all CS motions    Patient goals: To be able to perform ADLs and day to day activities w/o pain    Subjective    History of Present Illness  - Mechanism of injury: Pt presents w/ primary complaints of stiffness in his neck and pain in his L shoulder that has been going on for 7 months approximately. He notes that his pain has been progressively getting worse. He has a history of arthroscopic surgery on his R and notes that he was compensating by using his L shoulder more. He has seen physicians previously and they suspect that his pain may be related to his neck. Aggravating factors are lifting his arm OH, carrying, dressing, showering, ADLs, quick movements. Relieving factors are ice pack. Patient denies numbness/tingling but notes that he has shooting pain that goes down to his fingertips at times.   - Primary AD: N/A  -  Assist level at home: Independent  - Prior level of function: Independent  - Dominant hand: R handed    Pain  - Current pain ratin/10  - At best pain ratin/10  - At worst pain rating: 10/10  - Location: Anterior/superior aspect of his shoulder  - Aggravating factors: lifting his arm OH, carrying, dressing, showering, ADLs, quick movements  - Pain description: Sharp, burning, stabbing, achy    Objective     Red Flag Screening  - history of cancer, fever, chills, night sweats, weight loss, recent infection, immunosuppression, rest/night pain, saddle anesthesia, bladder dysfunction, and LE neurological deficits      Postural Assessment  -Posture in Sitting: Kyphotic w/ fwd head posture  -Posture in Standing: Kyphotic w/ fwd head posture  -Postural Correction: n/e    Sensation  - Light touch: Intact and symmetrical C4-T2  - Reflexes:   Left: C5: 2+  C6: 2+  C7: 2+   Right: C5: 2+  C6: 2+  C7: 2+     Active Range of Motion  Cervical Spine  Flexion:  No limitation  with pain  Extension:  Severely limited  with pain  Lateral Flexion - Left:  Moderately limited  with pain  Lateral Flexion - Right:  Moderately limited  with pain  Rotation - Left:  Moderately limited  with pain  Rotation - Right:  Moderately limited  with pain    Shoulder   LEFT  RIGHT  - Flexion: 160  160  - Abduction: 80  110    Mechanical Assessment  In Standing:  - Retraction: Produce, no worse  - Retraction w/ POP: Produce, no worse  - Retraction w/ : Produce, worse  - Retraction w/ ext: Produce, no worse    In supine:  - Retraction: produce, decrease    UE MMT  LEFT   RIGHT  - Shoulder Shru/5  5/5  - Shoulder Abduction:  3-/5  3-/5  - Elbow Flexion:  3+/5  4/5  - Elbow Extension:  3+/5  4/5    - Thumb Extension:  5/5  5/5  - Finger Adduction:   5/5  5/5      Joint Play  - CTJ: Hypomobile and Pain  - Mid-Thoracic Spine: Hypomobile and Pain  - Lower Thoracic Spine: Hypomobile and Pain                 Insurance Eval/ Re-eval POC expires  Auth Status Total visits  Start date  Expiration date Misc   AMA 7/2/25 10/2/25 No auth req    Co-Pay 0                 Date 7/2/25        Visit Number 1        Auth                  Manual                                                      TherEx         Repeated retraction in supine 2x10 HEP        Mechanical exam                  Open books         Repeated thoracic ext                                    Neuro Re-Ed                                                                                 TherAct                                                      Gait Training                                    Modalities         CP              Precautions: Diabetes  Past Medical History[1]               [1]   Past Medical History:  Diagnosis Date    Diabetes mellitus (HCC)     Gout     High cholesterol     Tendonitis

## 2025-07-09 ENCOUNTER — OFFICE VISIT (OUTPATIENT)
Dept: PHYSICAL THERAPY | Facility: CLINIC | Age: 55
End: 2025-07-09
Attending: STUDENT IN AN ORGANIZED HEALTH CARE EDUCATION/TRAINING PROGRAM
Payer: COMMERCIAL

## 2025-07-09 DIAGNOSIS — M54.2 CERVICALGIA: Primary | ICD-10-CM

## 2025-07-09 PROCEDURE — 97110 THERAPEUTIC EXERCISES: CPT

## 2025-07-09 NOTE — PROGRESS NOTES
Daily Note     Today's date: 2025  Patient name: Tony Ramsay  : 1970  MRN: [id number]  Referring provider: Iván Mack MD  Dx:   Encounter Diagnosis     ICD-10-CM    1. Cervicalgia  M54.2           Start Time: 1445  Stop Time: 1530  Total time in clinic (min): 45 minutes    Subjective: Pt reports feeling the same as on eval.       Objective: See treatment diary below      Assessment: Tolerated treatment well. Pt mechanically assessed today. He is having improved responses today compared to previously. He notes improved ROM for his CS and his L shoulder flexion. Responded best to ret w/ . Trialed cervical retraction in lying again with 2 pillows but he felt as this negatively affected his shoulder but helped his neck. Will continue to explore other positions and force progressions to improve lower cervical/upper TS extension. Continue to progress pt as he can tolerate w/ force progression. Patient demonstrated fatigue post treatment, exhibited good technique with therapeutic exercises, and would benefit from continued PT      Plan: Continue per plan of care.        Insurance Eval/ Re-eval POC expires Auth Status Total visits  Start date  Expiration date Misc   AMA 7/2/25 10/2/25 No auth req    Co-Pay 0                 Date 25       Visit Number 1 2       Auth                  Manual                                                      TherEx         Repeated retraction in supine 2x10 HEP 2x10 on 2 pillows       Mechanical exam  30'       Repeated ret  W/  5x8                         Open books         Repeated thoracic ext                                    Neuro Re-Ed                                                                                 TherAct                                                      Gait Training                                    Modalities         CP              Precautions: Diabetes  Past Medical History[1]               [1]   Past Medical  History:  Diagnosis Date    Diabetes mellitus (HCC)     Gout     High cholesterol     Tendonitis

## 2025-07-17 ENCOUNTER — OFFICE VISIT (OUTPATIENT)
Dept: PHYSICAL THERAPY | Facility: CLINIC | Age: 55
End: 2025-07-17
Attending: STUDENT IN AN ORGANIZED HEALTH CARE EDUCATION/TRAINING PROGRAM
Payer: COMMERCIAL

## 2025-07-17 DIAGNOSIS — M54.2 CERVICALGIA: Primary | ICD-10-CM

## 2025-07-17 PROCEDURE — 97110 THERAPEUTIC EXERCISES: CPT

## 2025-07-17 NOTE — PROGRESS NOTES
Daily Note     Today's date: 2025  Patient name: Tony Ramsay  : 1970  MRN: [id number]  Referring provider: Iván Mack MD  Dx:   Encounter Diagnosis     ICD-10-CM    1. Cervicalgia  M54.2           Start Time: 1100  Stop Time: 1145  Total time in clinic (min): 45 minutes    Subjective: Pt reports feeling pain in his shoulder. Notes his pain is constantly there but does fluctuate in severity.       Objective: See treatment diary below      Assessment: Tolerated treatment well. Reassessed pt. Still unable to make meaningful changes both through his shoulder and his CS. Discussed potential conditions such as a potential for RTC pathology or frozen shoulder due to his previous history of it and other predisposing factors. Only exercise that brought some relief was repeated horizontal ADD. Added AAROM in all motions for HEP. Continue to progress pt as he can tolerate w/ shoulder mobility. Patient demonstrated fatigue post treatment, exhibited good technique with therapeutic exercises, and would benefit from continued PT      Plan: Continue per plan of care.        Insurance Eval/ Re-eval POC expires Auth Status Total visits  Start date  Expiration date Misc   AMA 7/2/25 10/2/25 No auth req    Co-Pay 0                 Date 25      Visit Number 1 2 3      Auth                  Manual                                                      TherEx         Repeated retraction in supine 2x10 HEP 2x10 on 2 pillows On one pillow 2x10  On table 2x10      Mechanical exam  30'       Repeated ret  W/  5x8       Assessment   30'      Repeated horiz ADD   4x10      AAROM   All motions reviewed HEP      Open books         Repeated thoracic ext                                    Neuro Re-Ed                                                                                 TherAct                                                      Gait Training                                    Modalities          CP              Precautions: Diabetes  Past Medical History[1]                 [1]   Past Medical History:  Diagnosis Date    Diabetes mellitus (HCC)     Gout     High cholesterol     Tendonitis

## 2025-07-24 ENCOUNTER — OFFICE VISIT (OUTPATIENT)
Dept: PHYSICAL THERAPY | Facility: CLINIC | Age: 55
End: 2025-07-24
Attending: STUDENT IN AN ORGANIZED HEALTH CARE EDUCATION/TRAINING PROGRAM
Payer: COMMERCIAL

## 2025-07-24 DIAGNOSIS — M54.2 CERVICALGIA: Primary | ICD-10-CM

## 2025-07-24 PROCEDURE — 97110 THERAPEUTIC EXERCISES: CPT

## 2025-07-24 NOTE — PROGRESS NOTES
Daily Note     Today's date: 2025  Patient name: Tony Ramsay  : 1970  MRN: [id number]  Referring provider: Iván Mack MD  Dx:   Encounter Diagnosis     ICD-10-CM    1. Cervicalgia  M54.2           Start Time: 1100  Stop Time: 1145  Total time in clinic (min): 45 minutes    Subjective: Pt reports feeling mild improvements w/ his shoulder.       Objective: See treatment diary below      Assessment: Tolerated treatment well. Pt continues to demo signs consistent w/ adhesive capsulitis. Pt's ROM improved during today's session following ROM exercises. Educated him regarding pain free motions and to continually utilize motion to prevent further loss of ROM. Educated him regarding seeing physician for further treatment for his shoulder. Continue to progress pt as he can tolerate w/ shoulder mobility. Patient demonstrated fatigue post treatment, exhibited good technique with therapeutic exercises, and would benefit from continued PT      Plan: Continue per plan of care.        Insurance Eval/ Re-eval POC expires Auth Status Total visits  Start date  Expiration date Misc   AMA 7/2/25 10/2/25 No auth req    Co-Pay 0                 Date 25     Visit Number 1 2 3 4     Auth                  Manual                                                      TherEx         Repeated retraction in supine 2x10 HEP 2x10 on 2 pillows On one pillow 2x10  On table 2x10      Mechanical exam  30'       Repeated ret  W/  5x8       Assessment   30' 10' w/ pt edu     Repeated horiz ADD   4x10      AAROM   All motions reviewed HEP ABD w/ dowel 3x20     Open books         Repeated thoracic ext         PROM    Performed     AROM    Flexion              Neuro Re-Ed                                                                                 TherAct                                                      Gait Training                                    Modalities         CP              Precautions:  Diabetes  Past Medical History[1]                 [1]   Past Medical History:  Diagnosis Date    Diabetes mellitus (HCC)     Gout     High cholesterol     Tendonitis

## 2025-08-05 ENCOUNTER — OFFICE VISIT (OUTPATIENT)
Dept: PHYSICAL THERAPY | Facility: CLINIC | Age: 55
End: 2025-08-05
Attending: STUDENT IN AN ORGANIZED HEALTH CARE EDUCATION/TRAINING PROGRAM
Payer: COMMERCIAL

## 2025-08-05 DIAGNOSIS — M54.2 CERVICALGIA: Primary | ICD-10-CM

## 2025-08-05 PROCEDURE — 97110 THERAPEUTIC EXERCISES: CPT

## 2025-08-05 PROCEDURE — 97112 NEUROMUSCULAR REEDUCATION: CPT

## 2025-08-14 ENCOUNTER — OFFICE VISIT (OUTPATIENT)
Dept: PAIN MEDICINE | Facility: CLINIC | Age: 55
End: 2025-08-14
Attending: ORTHOPAEDIC SURGERY
Payer: COMMERCIAL

## 2025-08-20 ENCOUNTER — OFFICE VISIT (OUTPATIENT)
Dept: PHYSICAL THERAPY | Facility: CLINIC | Age: 55
End: 2025-08-20
Attending: STUDENT IN AN ORGANIZED HEALTH CARE EDUCATION/TRAINING PROGRAM
Payer: COMMERCIAL

## 2025-08-20 ENCOUNTER — HOSPITAL ENCOUNTER (OUTPATIENT)
Dept: RADIOLOGY | Facility: HOSPITAL | Age: 55
Discharge: HOME | End: 2025-08-20
Attending: STUDENT IN AN ORGANIZED HEALTH CARE EDUCATION/TRAINING PROGRAM
Payer: COMMERCIAL

## 2025-08-20 DIAGNOSIS — M47.812 CERVICAL SPONDYLOSIS: ICD-10-CM

## 2025-08-20 DIAGNOSIS — M54.2 CERVICALGIA: Primary | ICD-10-CM

## 2025-08-20 PROCEDURE — 97110 THERAPEUTIC EXERCISES: CPT

## 2025-08-20 PROCEDURE — 72141 MRI NECK SPINE W/O DYE: CPT

## (undated) DEVICE — GLOVE INDICATOR PI UNDERGLOVE SZ 8 BLUE

## (undated) DEVICE — ASTOUND SURGICAL GOWN, XXX LARGE, X-LONG: Brand: CONVERTORS

## (undated) DEVICE — DUAL SPIKE ADAPTER: Brand: CONMED

## (undated) DEVICE — POSITIONER TRIMANO LIMB BEACH CHAIR

## (undated) DEVICE — PACK ARTHROSCOPY

## (undated) DEVICE — GLOVE SRG BIOGEL 6.5

## (undated) DEVICE — 3M™ TEGADERM™ TRANSPARENT FILM DRESSING FRAME STYLE, 1624W, 2-3/8 IN X 2-3/4 IN (6 CM X 7 CM), 100/CT 4CT/CASE: Brand: 3M™ TEGADERM™

## (undated) DEVICE — U-DRAPE: Brand: CONVERTORS

## (undated) DEVICE — CHLORAPREP HI-LITE 26ML ORANGE

## (undated) DEVICE — GLOVE SRG BIOGEL 8

## (undated) DEVICE — TUBING ARTHROSCOPIC WAVE  MAIN PUMP

## (undated) DEVICE — INTENDED FOR TISSUE SEPARATION, AND OTHER PROCEDURES THAT REQUIRE A SHARP SURGICAL BLADE TO PUNCTURE OR CUT.: Brand: BARD-PARKER ® CARBON RIB-BACK BLADES

## (undated) DEVICE — SPONGE GAUZE 4 X 8 12 PLY STRL LF

## (undated) DEVICE — TOWEL SURG XR DETECT GREEN STRL RFD

## (undated) DEVICE — GLOVE INDICATOR PI UNDERGLOVE SZ 6.5 BLUE

## (undated) DEVICE — BLADE SHAVER TORPEDO 4MM 13CM  COOLCUT

## (undated) DEVICE — PROBE ABLATION  APOLLORF 90 DEG MULTI PORT

## (undated) DEVICE — TIBURON BEACH CHAIR SHOULDER DRAPE: Brand: CONVERTORS

## (undated) DEVICE — CANNULA 7 X70MM THRD SEAL SIDE PORT

## (undated) DEVICE — OCCLUSIVE GAUZE STRIP,3% BISMUTH TRIBROMOPHENATE IN PETROLATUM BLEND: Brand: XEROFORM

## (undated) DEVICE — 3M™ TEGADERM™ TRANSPARENT FILM DRESSING FRAME STYLE, 1626W, 4 IN X 4-3/4 IN (10 CM X 12 CM), 50/CT 4CT/CASE: Brand: 3M™ TEGADERM™

## (undated) DEVICE — POSITIONER HEAD DISP STRL

## (undated) DEVICE — TUBING SUCTION 5MM X 12 FT